# Patient Record
Sex: FEMALE | Race: WHITE | HISPANIC OR LATINO | Employment: OTHER | URBAN - METROPOLITAN AREA
[De-identification: names, ages, dates, MRNs, and addresses within clinical notes are randomized per-mention and may not be internally consistent; named-entity substitution may affect disease eponyms.]

---

## 2017-03-03 ENCOUNTER — APPOINTMENT (EMERGENCY)
Dept: CT IMAGING | Facility: HOSPITAL | Age: 72
End: 2017-03-03
Payer: MEDICARE

## 2017-03-03 ENCOUNTER — HOSPITAL ENCOUNTER (EMERGENCY)
Facility: HOSPITAL | Age: 72
Discharge: HOME | End: 2017-03-03
Attending: EMERGENCY MEDICINE | Admitting: EMERGENCY MEDICINE
Payer: MEDICARE

## 2017-03-03 VITALS
SYSTOLIC BLOOD PRESSURE: 172 MMHG | OXYGEN SATURATION: 98 % | HEART RATE: 85 BPM | RESPIRATION RATE: 16 BRPM | DIASTOLIC BLOOD PRESSURE: 74 MMHG | TEMPERATURE: 99.3 F | WEIGHT: 201.06 LBS

## 2017-03-03 DIAGNOSIS — K29.70 GASTRITIS: ICD-10-CM

## 2017-03-03 DIAGNOSIS — N94.89 ADNEXAL MASS: ICD-10-CM

## 2017-03-03 DIAGNOSIS — D25.9 UTERINE FIBROID: ICD-10-CM

## 2017-03-03 DIAGNOSIS — R10.13 EPIGASTRIC PAIN: Primary | ICD-10-CM

## 2017-03-03 LAB
ALBUMIN SERPL BCP-MCNC: 3.6 G/DL (ref 3.5–5)
ALP SERPL-CCNC: 358 U/L (ref 46–116)
ALT SERPL W P-5'-P-CCNC: 1139 U/L (ref 12–78)
ANION GAP SERPL CALCULATED.3IONS-SCNC: 7 MMOL/L (ref 4–13)
AST SERPL W P-5'-P-CCNC: 1478 U/L (ref 5–45)
ATRIAL RATE: 88 BPM
BASOPHILS # BLD AUTO: 0.03 THOUSANDS/ΜL (ref 0–0.1)
BASOPHILS NFR BLD AUTO: 0 % (ref 0–1)
BILIRUB SERPL-MCNC: 2 MG/DL (ref 0.2–1)
BUN SERPL-MCNC: 15 MG/DL (ref 5–25)
CALCIUM SERPL-MCNC: 9.3 MG/DL (ref 8.3–10.1)
CHLORIDE SERPL-SCNC: 105 MMOL/L (ref 100–108)
CLARITY, POC: CLEAR
CO2 SERPL-SCNC: 27 MMOL/L (ref 21–32)
COLOR, POC: YELLOW
CREAT SERPL-MCNC: 0.81 MG/DL (ref 0.6–1.3)
EOSINOPHIL # BLD AUTO: 0.02 THOUSAND/ΜL (ref 0–0.61)
EOSINOPHIL NFR BLD AUTO: 0 % (ref 0–6)
ERYTHROCYTE [DISTWIDTH] IN BLOOD BY AUTOMATED COUNT: 14.1 % (ref 11.6–15.1)
EXT BILIRUBIN, UA: NEGATIVE
EXT BLOOD URINE: NEGATIVE
EXT GLUCOSE, UA: NEGATIVE
EXT KETONES: NEGATIVE
EXT NITRITE, UA: NEGATIVE
EXT PH, UA: 8
EXT PROTEIN, UA: NEGATIVE
EXT SPECIFIC GRAVITY, UA: 1
EXT UROBILINOGEN: NEGATIVE
GFR SERPL CREATININE-BSD FRML MDRD: >60 ML/MIN/1.73SQ M
GLUCOSE SERPL-MCNC: 140 MG/DL (ref 65–140)
HCT VFR BLD AUTO: 41 % (ref 34.8–46.1)
HGB BLD-MCNC: 13.4 G/DL (ref 11.5–15.4)
LIPASE SERPL-CCNC: 106 U/L (ref 73–393)
LYMPHOCYTES # BLD AUTO: 0.51 THOUSANDS/ΜL (ref 0.6–4.47)
LYMPHOCYTES NFR BLD AUTO: 6 % (ref 14–44)
MCH RBC QN AUTO: 29.7 PG (ref 26.8–34.3)
MCHC RBC AUTO-ENTMCNC: 32.7 G/DL (ref 31.4–37.4)
MCV RBC AUTO: 91 FL (ref 82–98)
MONOCYTES # BLD AUTO: 0.59 THOUSAND/ΜL (ref 0.17–1.22)
MONOCYTES NFR BLD AUTO: 7 % (ref 4–12)
NEUTROPHILS # BLD AUTO: 7.33 THOUSANDS/ΜL (ref 1.85–7.62)
NEUTS SEG NFR BLD AUTO: 86 % (ref 43–75)
NRBC BLD AUTO-RTO: 0 /100 WBCS
P AXIS: 62 DEGREES
PLATELET # BLD AUTO: 232 THOUSANDS/UL (ref 149–390)
PMV BLD AUTO: 12.5 FL (ref 8.9–12.7)
POTASSIUM SERPL-SCNC: 4 MMOL/L (ref 3.5–5.3)
PR INTERVAL: 214 MS
PROT SERPL-MCNC: 7.6 G/DL (ref 6.4–8.2)
QRS AXIS: -16 DEGREES
QRSD INTERVAL: 124 MS
QT INTERVAL: 388 MS
QTC INTERVAL: 469 MS
RBC # BLD AUTO: 4.51 MILLION/UL (ref 3.81–5.12)
SODIUM SERPL-SCNC: 139 MMOL/L (ref 136–145)
T WAVE AXIS: 42 DEGREES
TROPONIN I SERPL-MCNC: 0.03 NG/ML
VENTRICULAR RATE: 88 BPM
WBC # BLD AUTO: 8.5 THOUSAND/UL (ref 4.31–10.16)
WBC # BLD EST: NEGATIVE 10*3/UL

## 2017-03-03 PROCEDURE — 96374 THER/PROPH/DIAG INJ IV PUSH: CPT

## 2017-03-03 PROCEDURE — 96361 HYDRATE IV INFUSION ADD-ON: CPT

## 2017-03-03 PROCEDURE — 93005 ELECTROCARDIOGRAM TRACING: CPT

## 2017-03-03 PROCEDURE — 83690 ASSAY OF LIPASE: CPT | Performed by: EMERGENCY MEDICINE

## 2017-03-03 PROCEDURE — 99284 EMERGENCY DEPT VISIT MOD MDM: CPT

## 2017-03-03 PROCEDURE — 84484 ASSAY OF TROPONIN QUANT: CPT | Performed by: EMERGENCY MEDICINE

## 2017-03-03 PROCEDURE — 85025 COMPLETE CBC W/AUTO DIFF WBC: CPT | Performed by: EMERGENCY MEDICINE

## 2017-03-03 PROCEDURE — 80053 COMPREHEN METABOLIC PANEL: CPT | Performed by: EMERGENCY MEDICINE

## 2017-03-03 PROCEDURE — 36415 COLL VENOUS BLD VENIPUNCTURE: CPT | Performed by: EMERGENCY MEDICINE

## 2017-03-03 PROCEDURE — 81002 URINALYSIS NONAUTO W/O SCOPE: CPT | Performed by: EMERGENCY MEDICINE

## 2017-03-03 PROCEDURE — 96375 TX/PRO/DX INJ NEW DRUG ADDON: CPT

## 2017-03-03 PROCEDURE — 74177 CT ABD & PELVIS W/CONTRAST: CPT

## 2017-03-03 RX ORDER — MAGNESIUM HYDROXIDE/ALUMINUM HYDROXICE/SIMETHICONE 120; 1200; 1200 MG/30ML; MG/30ML; MG/30ML
30 SUSPENSION ORAL ONCE
Status: COMPLETED | OUTPATIENT
Start: 2017-03-03 | End: 2017-03-03

## 2017-03-03 RX ORDER — NAPROXEN 500 MG/1
500 TABLET ORAL 2 TIMES DAILY WITH MEALS
COMMUNITY
End: 2017-04-13

## 2017-03-03 RX ORDER — LANSOPRAZOLE 30 MG/1
60 CAPSULE, DELAYED RELEASE ORAL DAILY
COMMUNITY
End: 2017-04-13

## 2017-03-03 RX ORDER — ONDANSETRON 2 MG/ML
4 INJECTION INTRAMUSCULAR; INTRAVENOUS ONCE
Status: COMPLETED | OUTPATIENT
Start: 2017-03-03 | End: 2017-03-03

## 2017-03-03 RX ORDER — ASCORBIC ACID, TOCOPHERYL ACID SUCCINATE, THIAMINE, RIBOFLAVIN, NIACINAMIDE, PYRIDOXINE, FOLIC ACID, COBALAMIN, BIOTIN, PANTOTHENIC ACID, ZINC, SELENIUM 100; 1.5; 1.7; 20; 25; 3; 1; 300; 10; 15; 30; 7 MG/1; MG/1; MG/1; MG/1; MG/1; MG/1; MG/1; UG/1; MG/1; MG/1; [IU]/1; UG/1
1 TABLET, COATED ORAL DAILY
COMMUNITY
End: 2017-04-13

## 2017-03-03 RX ORDER — ATENOLOL 50 MG/1
50 TABLET ORAL DAILY
COMMUNITY
End: 2020-08-24 | Stop reason: SDUPTHER

## 2017-03-03 RX ADMIN — IOHEXOL 100 ML: 350 INJECTION, SOLUTION INTRAVENOUS at 09:21

## 2017-03-03 RX ADMIN — ALUMINUM HYDROXIDE, MAGNESIUM HYDROXIDE, AND SIMETHICONE 30 ML: 200; 200; 20 SUSPENSION ORAL at 08:03

## 2017-03-03 RX ADMIN — FAMOTIDINE 20 MG: 10 INJECTION, SOLUTION INTRAVENOUS at 08:04

## 2017-03-03 RX ADMIN — ONDANSETRON 4 MG: 2 INJECTION INTRAMUSCULAR; INTRAVENOUS at 08:04

## 2017-03-03 RX ADMIN — LIDOCAINE HYDROCHLORIDE 15 ML: 20 SOLUTION ORAL; TOPICAL at 08:03

## 2017-03-03 RX ADMIN — SODIUM CHLORIDE 1000 ML: 0.9 INJECTION, SOLUTION INTRAVENOUS at 08:03

## 2017-03-24 ENCOUNTER — LAB (OUTPATIENT)
Dept: LAB | Facility: HOSPITAL | Age: 72
End: 2017-03-24
Attending: OBSTETRICS & GYNECOLOGY
Payer: MEDICARE

## 2017-03-24 ENCOUNTER — HOSPITAL ENCOUNTER (OUTPATIENT)
Dept: RADIOLOGY | Facility: HOSPITAL | Age: 72
Discharge: HOME | End: 2017-03-24
Payer: MEDICARE

## 2017-03-24 ENCOUNTER — ALLSCRIPTS OFFICE VISIT (OUTPATIENT)
Dept: OTHER | Facility: OTHER | Age: 72
End: 2017-03-24

## 2017-03-24 ENCOUNTER — TRANSCRIBE ORDERS (OUTPATIENT)
Dept: ADMINISTRATIVE | Facility: HOSPITAL | Age: 72
End: 2017-03-24

## 2017-03-24 ENCOUNTER — HOSPITAL ENCOUNTER (OUTPATIENT)
Dept: RADIOLOGY | Facility: HOSPITAL | Age: 72
Discharge: HOME | End: 2017-03-24
Attending: OBSTETRICS & GYNECOLOGY
Payer: MEDICARE

## 2017-03-24 DIAGNOSIS — K75.9 INFLAMMATORY LIVER DISEASE: ICD-10-CM

## 2017-03-24 DIAGNOSIS — R19.00 ABDOMINAL OR PELVIC SWELLING, MASS OR LUMP, UNSPECIFIED SITE: ICD-10-CM

## 2017-03-24 DIAGNOSIS — R19.00 INTRA-ABDOMINAL AND PELVIC SWELLING, MASS AND LUMP, UNSPECIFIED SITE: ICD-10-CM

## 2017-03-24 DIAGNOSIS — K75.9 HEPATITIS, UNSPECIFIED: ICD-10-CM

## 2017-03-24 DIAGNOSIS — K75.9 HEPATITIS, UNSPECIFIED: Primary | ICD-10-CM

## 2017-03-24 LAB
ALBUMIN SERPL BCP-MCNC: 3.5 G/DL (ref 3.5–5)
ALP SERPL-CCNC: 384 U/L (ref 46–116)
ALT SERPL W P-5'-P-CCNC: 766 U/L (ref 12–78)
ANION GAP SERPL CALCULATED.3IONS-SCNC: 7 MMOL/L (ref 4–13)
AST SERPL W P-5'-P-CCNC: 359 U/L (ref 5–45)
BASOPHILS # BLD AUTO: 0.03 THOUSANDS/ΜL (ref 0–0.1)
BASOPHILS NFR BLD AUTO: 1 % (ref 0–1)
BILIRUB DIRECT SERPL-MCNC: 0.38 MG/DL (ref 0–0.2)
BILIRUB SERPL-MCNC: 0.8 MG/DL (ref 0.2–1)
BUN SERPL-MCNC: 17 MG/DL (ref 5–25)
CALCIUM SERPL-MCNC: 9.7 MG/DL (ref 8.3–10.1)
CANCER AG125 SERPL-ACNC: 7.4 U/ML (ref 0–30)
CHLORIDE SERPL-SCNC: 105 MMOL/L (ref 100–108)
CO2 SERPL-SCNC: 30 MMOL/L (ref 21–32)
CREAT SERPL-MCNC: 0.8 MG/DL (ref 0.6–1.3)
EOSINOPHIL # BLD AUTO: 0.17 THOUSAND/ΜL (ref 0–0.61)
EOSINOPHIL NFR BLD AUTO: 3 % (ref 0–6)
ERYTHROCYTE [DISTWIDTH] IN BLOOD BY AUTOMATED COUNT: 14.2 % (ref 11.6–15.1)
EST. AVERAGE GLUCOSE BLD GHB EST-MCNC: 134 MG/DL
GFR SERPL CREATININE-BSD FRML MDRD: >60 ML/MIN/1.73SQ M
GLUCOSE SERPL-MCNC: 173 MG/DL (ref 65–140)
HBA1C MFR BLD: 6.3 % (ref 4.2–6.3)
HCT VFR BLD AUTO: 42.1 % (ref 34.8–46.1)
HGB BLD-MCNC: 13.4 G/DL (ref 11.5–15.4)
LYMPHOCYTES # BLD AUTO: 1.99 THOUSANDS/ΜL (ref 0.6–4.47)
LYMPHOCYTES NFR BLD AUTO: 31 % (ref 14–44)
MCH RBC QN AUTO: 29.5 PG (ref 26.8–34.3)
MCHC RBC AUTO-ENTMCNC: 31.8 G/DL (ref 31.4–37.4)
MCV RBC AUTO: 93 FL (ref 82–98)
MONOCYTES # BLD AUTO: 0.4 THOUSAND/ΜL (ref 0.17–1.22)
MONOCYTES NFR BLD AUTO: 6 % (ref 4–12)
NEUTROPHILS # BLD AUTO: 3.8 THOUSANDS/ΜL (ref 1.85–7.62)
NEUTS SEG NFR BLD AUTO: 59 % (ref 43–75)
NRBC BLD AUTO-RTO: 0 /100 WBCS
PLATELET # BLD AUTO: 185 THOUSANDS/UL (ref 149–390)
PMV BLD AUTO: 12.1 FL (ref 8.9–12.7)
POTASSIUM SERPL-SCNC: 3.7 MMOL/L (ref 3.5–5.3)
PROT SERPL-MCNC: 7.6 G/DL (ref 6.4–8.2)
RBC # BLD AUTO: 4.54 MILLION/UL (ref 3.81–5.12)
SODIUM SERPL-SCNC: 142 MMOL/L (ref 136–145)
WBC # BLD AUTO: 6.41 THOUSAND/UL (ref 4.31–10.16)

## 2017-03-24 PROCEDURE — 36415 COLL VENOUS BLD VENIPUNCTURE: CPT

## 2017-03-24 PROCEDURE — 80074 ACUTE HEPATITIS PANEL: CPT

## 2017-03-24 PROCEDURE — 80053 COMPREHEN METABOLIC PANEL: CPT

## 2017-03-24 PROCEDURE — 85025 COMPLETE CBC W/AUTO DIFF WBC: CPT

## 2017-03-24 PROCEDURE — 83036 HEMOGLOBIN GLYCOSYLATED A1C: CPT

## 2017-03-24 PROCEDURE — 71010 HB CHEST X-RAY 1 VIEW FRONTAL: CPT

## 2017-03-24 PROCEDURE — 82248 BILIRUBIN DIRECT: CPT

## 2017-03-24 PROCEDURE — 74022 RADEX COMPL AQT ABD SERIES: CPT

## 2017-03-24 PROCEDURE — 86304 IMMUNOASSAY TUMOR CA 125: CPT

## 2017-03-25 LAB
HAV IGM SER QL: NORMAL
HBV CORE IGM SER QL: NORMAL
HBV SURFACE AG SER QL: NORMAL
HCV AB SER QL: NORMAL

## 2017-04-01 ENCOUNTER — HOSPITAL ENCOUNTER (OUTPATIENT)
Dept: ULTRASOUND IMAGING | Facility: HOSPITAL | Age: 72
Discharge: HOME | End: 2017-04-01
Attending: OBSTETRICS & GYNECOLOGY
Payer: MEDICARE

## 2017-04-01 DIAGNOSIS — K75.9 INFLAMMATORY LIVER DISEASE: ICD-10-CM

## 2017-04-01 DIAGNOSIS — R19.00 INTRA-ABDOMINAL AND PELVIC SWELLING, MASS AND LUMP, UNSPECIFIED SITE: ICD-10-CM

## 2017-04-01 PROCEDURE — 76705 ECHO EXAM OF ABDOMEN: CPT

## 2017-04-05 ENCOUNTER — GENERIC CONVERSION - ENCOUNTER (OUTPATIENT)
Dept: OTHER | Facility: OTHER | Age: 72
End: 2017-04-05

## 2017-04-10 ENCOUNTER — ALLSCRIPTS OFFICE VISIT (OUTPATIENT)
Dept: OTHER | Facility: OTHER | Age: 72
End: 2017-04-10

## 2017-04-13 RX ORDER — CHOLECALCIFEROL (VITAMIN D3) 125 MCG
1 CAPSULE ORAL DAILY
COMMUNITY

## 2017-04-13 RX ORDER — LANOLIN ALCOHOL/MO/W.PET/CERES
2 CREAM (GRAM) TOPICAL DAILY
COMMUNITY
End: 2017-06-15 | Stop reason: ALTCHOICE

## 2017-04-13 RX ORDER — RANITIDINE 150 MG/1
150 CAPSULE ORAL AS NEEDED
COMMUNITY
End: 2020-01-29

## 2017-04-17 ENCOUNTER — APPOINTMENT (OUTPATIENT)
Dept: LAB | Facility: HOSPITAL | Age: 72
End: 2017-04-17
Attending: INTERNAL MEDICINE
Payer: MEDICARE

## 2017-04-17 ENCOUNTER — TRANSCRIBE ORDERS (OUTPATIENT)
Dept: LAB | Facility: HOSPITAL | Age: 72
End: 2017-04-17

## 2017-04-17 DIAGNOSIS — R19.00 ABDOMINAL OR PELVIC SWELLING, MASS OR LUMP, UNSPECIFIED SITE: Primary | ICD-10-CM

## 2017-04-17 DIAGNOSIS — K75.9 INFLAMMATORY LIVER DISEASE: ICD-10-CM

## 2017-04-17 LAB
ALBUMIN SERPL BCP-MCNC: 3.3 G/DL (ref 3.5–5)
ALP SERPL-CCNC: 119 U/L (ref 46–116)
ALT SERPL W P-5'-P-CCNC: 44 U/L (ref 12–78)
ANION GAP SERPL CALCULATED.3IONS-SCNC: 7 MMOL/L (ref 4–13)
AST SERPL W P-5'-P-CCNC: 31 U/L (ref 5–45)
BILIRUB SERPL-MCNC: 0.3 MG/DL (ref 0.2–1)
BUN SERPL-MCNC: 13 MG/DL (ref 5–25)
CALCIUM SERPL-MCNC: 9.4 MG/DL (ref 8.3–10.1)
CHLORIDE SERPL-SCNC: 106 MMOL/L (ref 100–108)
CO2 SERPL-SCNC: 30 MMOL/L (ref 21–32)
CREAT SERPL-MCNC: 0.91 MG/DL (ref 0.6–1.3)
FERRITIN SERPL-MCNC: 26 NG/ML (ref 8–388)
GFR SERPL CREATININE-BSD FRML MDRD: >60 ML/MIN/1.73SQ M
GLUCOSE P FAST SERPL-MCNC: 101 MG/DL (ref 65–99)
INR PPP: 1.09 (ref 0.86–1.16)
IRON SATN MFR SERPL: 13 %
IRON SERPL-MCNC: 42 UG/DL (ref 50–170)
POTASSIUM SERPL-SCNC: 4 MMOL/L (ref 3.5–5.3)
PROT SERPL-MCNC: 7.1 G/DL (ref 6.4–8.2)
PROTHROMBIN TIME: 14 SECONDS (ref 12–14.3)
SODIUM SERPL-SCNC: 143 MMOL/L (ref 136–145)
TIBC SERPL-MCNC: 314 UG/DL (ref 250–450)

## 2017-04-17 PROCEDURE — 83550 IRON BINDING TEST: CPT

## 2017-04-17 PROCEDURE — 83540 ASSAY OF IRON: CPT

## 2017-04-17 PROCEDURE — 85610 PROTHROMBIN TIME: CPT

## 2017-04-17 PROCEDURE — 36415 COLL VENOUS BLD VENIPUNCTURE: CPT

## 2017-04-17 PROCEDURE — 80053 COMPREHEN METABOLIC PANEL: CPT

## 2017-04-17 PROCEDURE — 86038 ANTINUCLEAR ANTIBODIES: CPT

## 2017-04-17 PROCEDURE — 86235 NUCLEAR ANTIGEN ANTIBODY: CPT

## 2017-04-17 PROCEDURE — 82728 ASSAY OF FERRITIN: CPT

## 2017-04-18 LAB — ACTIN IGG SERPL-ACNC: 14 UNITS (ref 0–19)

## 2017-04-19 LAB — RYE IGE QN: NEGATIVE

## 2017-04-23 ENCOUNTER — GENERIC CONVERSION - ENCOUNTER (OUTPATIENT)
Dept: OTHER | Facility: OTHER | Age: 72
End: 2017-04-23

## 2017-05-03 ENCOUNTER — TRANSCRIBE ORDERS (OUTPATIENT)
Dept: ADMINISTRATIVE | Facility: HOSPITAL | Age: 72
End: 2017-05-03

## 2017-05-03 ENCOUNTER — LAB REQUISITION (OUTPATIENT)
Dept: LAB | Facility: HOSPITAL | Age: 72
End: 2017-05-03
Payer: MEDICARE

## 2017-05-03 ENCOUNTER — APPOINTMENT (OUTPATIENT)
Dept: LAB | Facility: HOSPITAL | Age: 72
End: 2017-05-03
Attending: OBSTETRICS & GYNECOLOGY
Payer: MEDICARE

## 2017-05-03 DIAGNOSIS — R19.00 ABDOMINAL OR PELVIC SWELLING, MASS OR LUMP, UNSPECIFIED SITE: ICD-10-CM

## 2017-05-03 DIAGNOSIS — R19.00 INTRA-ABDOMINAL AND PELVIC SWELLING, MASS AND LUMP, UNSPECIFIED SITE: ICD-10-CM

## 2017-05-03 DIAGNOSIS — R19.00 ABDOMINAL OR PELVIC SWELLING, MASS OR LUMP, UNSPECIFIED SITE: Primary | ICD-10-CM

## 2017-05-03 LAB
ABO GROUP BLD: NORMAL
BLD GP AB SCN SERPL QL: NEGATIVE
RH BLD: POSITIVE
SPECIMEN EXPIRATION DATE: NORMAL

## 2017-05-03 PROCEDURE — 36415 COLL VENOUS BLD VENIPUNCTURE: CPT

## 2017-05-03 PROCEDURE — 86850 RBC ANTIBODY SCREEN: CPT | Performed by: OBSTETRICS & GYNECOLOGY

## 2017-05-03 PROCEDURE — 86901 BLOOD TYPING SEROLOGIC RH(D): CPT | Performed by: OBSTETRICS & GYNECOLOGY

## 2017-05-03 PROCEDURE — 86900 BLOOD TYPING SEROLOGIC ABO: CPT | Performed by: OBSTETRICS & GYNECOLOGY

## 2017-05-04 ENCOUNTER — GENERIC CONVERSION - ENCOUNTER (OUTPATIENT)
Dept: OTHER | Facility: OTHER | Age: 72
End: 2017-05-04

## 2017-05-10 ENCOUNTER — ANESTHESIA EVENT (OUTPATIENT)
Dept: PERIOP | Facility: HOSPITAL | Age: 72
DRG: 330 | End: 2017-05-10
Payer: MEDICARE

## 2017-05-11 ENCOUNTER — ANESTHESIA (OUTPATIENT)
Dept: PERIOP | Facility: HOSPITAL | Age: 72
DRG: 330 | End: 2017-05-11
Payer: MEDICARE

## 2017-05-11 ENCOUNTER — HOSPITAL ENCOUNTER (INPATIENT)
Facility: HOSPITAL | Age: 72
LOS: 5 days | Discharge: HOME HEALTH | DRG: 330 | End: 2017-05-16
Attending: OBSTETRICS & GYNECOLOGY | Admitting: OBSTETRICS & GYNECOLOGY
Payer: MEDICARE

## 2017-05-11 DIAGNOSIS — R19.00 INTRA-ABDOMINAL AND PELVIC SWELLING, MASS AND LUMP, UNSPECIFIED SITE: ICD-10-CM

## 2017-05-11 PROBLEM — C78.6 PERITONEAL CARCINOMATOSIS (HCC): Status: ACTIVE | Noted: 2017-05-11

## 2017-05-11 PROBLEM — G47.30 SLEEP APNEA: Status: ACTIVE | Noted: 2017-05-11

## 2017-05-11 PROBLEM — K59.09 CHRONIC CONSTIPATION: Status: ACTIVE | Noted: 2017-05-11

## 2017-05-11 PROBLEM — E78.5 HYPERLIPIDEMIA: Status: ACTIVE | Noted: 2017-05-11

## 2017-05-11 PROBLEM — M51.9 DISORDER OF INTERVERTEBRAL DISC: Status: ACTIVE | Noted: 2017-05-11

## 2017-05-11 LAB
ALBUMIN SERPL BCP-MCNC: 3.1 G/DL (ref 3.5–5)
ALP SERPL-CCNC: 64 U/L (ref 46–116)
ALT SERPL W P-5'-P-CCNC: 155 U/L (ref 12–78)
ANION GAP SERPL CALCULATED.3IONS-SCNC: 10 MMOL/L (ref 4–13)
APTT PPP: 25 SECONDS (ref 23–35)
AST SERPL W P-5'-P-CCNC: 204 U/L (ref 5–45)
BASE EXCESS BLDA CALC-SCNC: -5 MMOL/L (ref -2–3)
BASOPHILS # BLD AUTO: 0 THOUSANDS/ΜL (ref 0–0.1)
BASOPHILS NFR BLD AUTO: 0 % (ref 0–1)
BILIRUB SERPL-MCNC: 0.93 MG/DL (ref 0.2–1)
BUN SERPL-MCNC: 12 MG/DL (ref 5–25)
CA-I BLD-SCNC: 1.18 MMOL/L (ref 1.12–1.32)
CALCIUM SERPL-MCNC: 7.9 MG/DL (ref 8.3–10.1)
CHLORIDE SERPL-SCNC: 109 MMOL/L (ref 100–108)
CO2 SERPL-SCNC: 22 MMOL/L (ref 21–32)
CREAT SERPL-MCNC: 0.78 MG/DL (ref 0.6–1.3)
EOSINOPHIL # BLD AUTO: 0.01 THOUSAND/ΜL (ref 0–0.61)
EOSINOPHIL NFR BLD AUTO: 0 % (ref 0–6)
ERYTHROCYTE [DISTWIDTH] IN BLOOD BY AUTOMATED COUNT: 13.8 % (ref 11.6–15.1)
ERYTHROCYTE [DISTWIDTH] IN BLOOD BY AUTOMATED COUNT: 13.9 % (ref 11.6–15.1)
GFR SERPL CREATININE-BSD FRML MDRD: >60 ML/MIN/1.73SQ M
GLUCOSE SERPL-MCNC: 152 MG/DL (ref 65–140)
GLUCOSE SERPL-MCNC: 168 MG/DL (ref 65–140)
GLUCOSE SERPL-MCNC: 174 MG/DL (ref 65–140)
HCO3 BLDA-SCNC: 20.9 MMOL/L (ref 22–28)
HCT VFR BLD AUTO: 32.8 % (ref 34.8–46.1)
HCT VFR BLD AUTO: 46.8 % (ref 34.8–46.1)
HCT VFR BLD CALC: 32 % (ref 34.8–46.1)
HGB BLD-MCNC: 10.7 G/DL (ref 11.5–15.4)
HGB BLD-MCNC: 15.3 G/DL (ref 11.5–15.4)
HGB BLDA-MCNC: 10.9 G/DL (ref 11.5–15.4)
INR PPP: 1.3 (ref 0.86–1.16)
LYMPHOCYTES # BLD AUTO: 0.63 THOUSANDS/ΜL (ref 0.6–4.47)
LYMPHOCYTES NFR BLD AUTO: 8 % (ref 14–44)
MCH RBC QN AUTO: 29.8 PG (ref 26.8–34.3)
MCH RBC QN AUTO: 29.9 PG (ref 26.8–34.3)
MCHC RBC AUTO-ENTMCNC: 32.6 G/DL (ref 31.4–37.4)
MCHC RBC AUTO-ENTMCNC: 32.7 G/DL (ref 31.4–37.4)
MCV RBC AUTO: 91 FL (ref 82–98)
MCV RBC AUTO: 92 FL (ref 82–98)
MONOCYTES # BLD AUTO: 0.61 THOUSAND/ΜL (ref 0.17–1.22)
MONOCYTES NFR BLD AUTO: 8 % (ref 4–12)
NEUTROPHILS # BLD AUTO: 6.82 THOUSANDS/ΜL (ref 1.85–7.62)
NEUTS SEG NFR BLD AUTO: 84 % (ref 43–75)
NRBC BLD AUTO-RTO: 0 /100 WBCS
PCO2 BLD: 22 MMOL/L (ref 21–32)
PCO2 BLD: 41.3 MM HG (ref 36–44)
PH BLD: 7.31 [PH] (ref 7.35–7.45)
PLATELET # BLD AUTO: 150 THOUSANDS/UL (ref 149–390)
PLATELET # BLD AUTO: 214 THOUSANDS/UL (ref 149–390)
PMV BLD AUTO: 11.8 FL (ref 8.9–12.7)
PMV BLD AUTO: 12.3 FL (ref 8.9–12.7)
PO2 BLD: 63 MM HG (ref 75–129)
POTASSIUM BLD-SCNC: 3.4 MMOL/L (ref 3.5–5.3)
POTASSIUM SERPL-SCNC: 3.3 MMOL/L (ref 3.5–5.3)
PROT SERPL-MCNC: 5.4 G/DL (ref 6.4–8.2)
PROTHROMBIN TIME: 16.3 SECONDS (ref 12.1–14.4)
RBC # BLD AUTO: 3.59 MILLION/UL (ref 3.81–5.12)
RBC # BLD AUTO: 5.11 MILLION/UL (ref 3.81–5.12)
SAO2 % BLD FROM PO2: 90 % (ref 95–98)
SODIUM BLD-SCNC: 142 MMOL/L (ref 136–145)
SODIUM SERPL-SCNC: 141 MMOL/L (ref 136–145)
SPECIMEN SOURCE: ABNORMAL
WBC # BLD AUTO: 8.08 THOUSAND/UL (ref 4.31–10.16)
WBC # BLD AUTO: 8.74 THOUSAND/UL (ref 4.31–10.16)

## 2017-05-11 PROCEDURE — 94762 N-INVAS EAR/PLS OXIMTRY CONT: CPT

## 2017-05-11 PROCEDURE — 0DTP0ZZ RESECTION OF RECTUM, OPEN APPROACH: ICD-10-PCS | Performed by: OBSTETRICS & GYNECOLOGY

## 2017-05-11 PROCEDURE — 85027 COMPLETE CBC AUTOMATED: CPT | Performed by: ANESTHESIOLOGY

## 2017-05-11 PROCEDURE — 85014 HEMATOCRIT: CPT

## 2017-05-11 PROCEDURE — 82947 ASSAY GLUCOSE BLOOD QUANT: CPT

## 2017-05-11 PROCEDURE — 88305 TISSUE EXAM BY PATHOLOGIST: CPT | Performed by: OBSTETRICS & GYNECOLOGY

## 2017-05-11 PROCEDURE — 0BTR0ZZ: ICD-10-PCS | Performed by: OBSTETRICS & GYNECOLOGY

## 2017-05-11 PROCEDURE — 88342 IMHCHEM/IMCYTCHM 1ST ANTB: CPT | Performed by: OBSTETRICS & GYNECOLOGY

## 2017-05-11 PROCEDURE — 0DBW4ZX EXCISION OF PERITONEUM, PERCUTANEOUS ENDOSCOPIC APPROACH, DIAGNOSTIC: ICD-10-PCS | Performed by: OBSTETRICS & GYNECOLOGY

## 2017-05-11 PROCEDURE — 82803 BLOOD GASES ANY COMBINATION: CPT

## 2017-05-11 PROCEDURE — 82330 ASSAY OF CALCIUM: CPT

## 2017-05-11 PROCEDURE — 0UTC0ZZ RESECTION OF CERVIX, OPEN APPROACH: ICD-10-PCS | Performed by: OBSTETRICS & GYNECOLOGY

## 2017-05-11 PROCEDURE — 0DTN0ZZ RESECTION OF SIGMOID COLON, OPEN APPROACH: ICD-10-PCS | Performed by: OBSTETRICS & GYNECOLOGY

## 2017-05-11 PROCEDURE — 86920 COMPATIBILITY TEST SPIN: CPT

## 2017-05-11 PROCEDURE — 85610 PROTHROMBIN TIME: CPT | Performed by: OBSTETRICS & GYNECOLOGY

## 2017-05-11 PROCEDURE — 0UT70ZZ RESECTION OF BILATERAL FALLOPIAN TUBES, OPEN APPROACH: ICD-10-PCS | Performed by: OBSTETRICS & GYNECOLOGY

## 2017-05-11 PROCEDURE — 84295 ASSAY OF SERUM SODIUM: CPT

## 2017-05-11 PROCEDURE — 84132 ASSAY OF SERUM POTASSIUM: CPT

## 2017-05-11 PROCEDURE — 0DJD8ZZ INSPECTION OF LOWER INTESTINAL TRACT, VIA NATURAL OR ARTIFICIAL OPENING ENDOSCOPIC: ICD-10-PCS | Performed by: OBSTETRICS & GYNECOLOGY

## 2017-05-11 PROCEDURE — 0DBS0ZZ EXCISION OF GREATER OMENTUM, OPEN APPROACH: ICD-10-PCS | Performed by: OBSTETRICS & GYNECOLOGY

## 2017-05-11 PROCEDURE — 82948 REAGENT STRIP/BLOOD GLUCOSE: CPT

## 2017-05-11 PROCEDURE — 0UT90ZZ RESECTION OF UTERUS, OPEN APPROACH: ICD-10-PCS | Performed by: OBSTETRICS & GYNECOLOGY

## 2017-05-11 PROCEDURE — 80053 COMPREHEN METABOLIC PANEL: CPT | Performed by: OBSTETRICS & GYNECOLOGY

## 2017-05-11 PROCEDURE — 88331 PATH CONSLTJ SURG 1 BLK 1SPC: CPT | Performed by: OBSTETRICS & GYNECOLOGY

## 2017-05-11 PROCEDURE — 88309 TISSUE EXAM BY PATHOLOGIST: CPT | Performed by: OBSTETRICS & GYNECOLOGY

## 2017-05-11 PROCEDURE — 0UN40ZZ RELEASE UTERINE SUPPORTING STRUCTURE, OPEN APPROACH: ICD-10-PCS | Performed by: OBSTETRICS & GYNECOLOGY

## 2017-05-11 PROCEDURE — 85025 COMPLETE CBC W/AUTO DIFF WBC: CPT | Performed by: OBSTETRICS & GYNECOLOGY

## 2017-05-11 PROCEDURE — 85730 THROMBOPLASTIN TIME PARTIAL: CPT | Performed by: OBSTETRICS & GYNECOLOGY

## 2017-05-11 PROCEDURE — 0UT20ZZ RESECTION OF BILATERAL OVARIES, OPEN APPROACH: ICD-10-PCS | Performed by: OBSTETRICS & GYNECOLOGY

## 2017-05-11 PROCEDURE — 88341 IMHCHEM/IMCYTCHM EA ADD ANTB: CPT | Performed by: OBSTETRICS & GYNECOLOGY

## 2017-05-11 RX ORDER — PANTOPRAZOLE SODIUM 40 MG/1
40 TABLET, DELAYED RELEASE ORAL DAILY
Status: CANCELLED | OUTPATIENT
Start: 2017-05-11

## 2017-05-11 RX ORDER — ALBUTEROL SULFATE 2.5 MG/3ML
2.5 SOLUTION RESPIRATORY (INHALATION) ONCE AS NEEDED
Status: DISCONTINUED | OUTPATIENT
Start: 2017-05-11 | End: 2017-05-11 | Stop reason: HOSPADM

## 2017-05-11 RX ORDER — ONDANSETRON 2 MG/ML
INJECTION INTRAMUSCULAR; INTRAVENOUS AS NEEDED
Status: DISCONTINUED | OUTPATIENT
Start: 2017-05-11 | End: 2017-05-11 | Stop reason: SURG

## 2017-05-11 RX ORDER — DIPHENHYDRAMINE HYDROCHLORIDE 50 MG/ML
25 INJECTION INTRAMUSCULAR; INTRAVENOUS EVERY 6 HOURS PRN
Status: DISCONTINUED | OUTPATIENT
Start: 2017-05-11 | End: 2017-05-16 | Stop reason: HOSPADM

## 2017-05-11 RX ORDER — GLYCOPYRROLATE 0.2 MG/ML
INJECTION INTRAMUSCULAR; INTRAVENOUS AS NEEDED
Status: DISCONTINUED | OUTPATIENT
Start: 2017-05-11 | End: 2017-05-11 | Stop reason: SURG

## 2017-05-11 RX ORDER — NALOXONE HYDROCHLORIDE 0.4 MG/ML
0.1 INJECTION, SOLUTION INTRAMUSCULAR; INTRAVENOUS; SUBCUTANEOUS AS NEEDED
Status: DISCONTINUED | OUTPATIENT
Start: 2017-05-11 | End: 2017-05-14

## 2017-05-11 RX ORDER — HEPARIN SODIUM 5000 [USP'U]/ML
5000 INJECTION, SOLUTION INTRAVENOUS; SUBCUTANEOUS EVERY 8 HOURS SCHEDULED
Status: DISCONTINUED | OUTPATIENT
Start: 2017-05-11 | End: 2017-05-11

## 2017-05-11 RX ORDER — ONDANSETRON 2 MG/ML
4 INJECTION INTRAMUSCULAR; INTRAVENOUS ONCE
Status: DISCONTINUED | OUTPATIENT
Start: 2017-05-11 | End: 2017-05-11 | Stop reason: HOSPADM

## 2017-05-11 RX ORDER — ONDANSETRON 2 MG/ML
4 INJECTION INTRAMUSCULAR; INTRAVENOUS EVERY 6 HOURS PRN
Status: DISCONTINUED | OUTPATIENT
Start: 2017-05-11 | End: 2017-05-16 | Stop reason: HOSPADM

## 2017-05-11 RX ORDER — MIDAZOLAM HYDROCHLORIDE 1 MG/ML
INJECTION INTRAMUSCULAR; INTRAVENOUS AS NEEDED
Status: DISCONTINUED | OUTPATIENT
Start: 2017-05-11 | End: 2017-05-11 | Stop reason: SURG

## 2017-05-11 RX ORDER — ROCURONIUM BROMIDE 10 MG/ML
INJECTION, SOLUTION INTRAVENOUS AS NEEDED
Status: DISCONTINUED | OUTPATIENT
Start: 2017-05-11 | End: 2017-05-11 | Stop reason: SURG

## 2017-05-11 RX ORDER — ALBUMIN, HUMAN INJ 5% 5 %
SOLUTION INTRAVENOUS CONTINUOUS PRN
Status: DISCONTINUED | OUTPATIENT
Start: 2017-05-11 | End: 2017-05-11 | Stop reason: SURG

## 2017-05-11 RX ORDER — MAGNESIUM HYDROXIDE 1200 MG/15ML
LIQUID ORAL AS NEEDED
Status: DISCONTINUED | OUTPATIENT
Start: 2017-05-11 | End: 2017-05-11 | Stop reason: HOSPADM

## 2017-05-11 RX ORDER — EPHEDRINE SULFATE 50 MG/ML
INJECTION, SOLUTION INTRAVENOUS AS NEEDED
Status: DISCONTINUED | OUTPATIENT
Start: 2017-05-11 | End: 2017-05-11 | Stop reason: SURG

## 2017-05-11 RX ORDER — PROPOFOL 10 MG/ML
INJECTION, EMULSION INTRAVENOUS AS NEEDED
Status: DISCONTINUED | OUTPATIENT
Start: 2017-05-11 | End: 2017-05-11 | Stop reason: SURG

## 2017-05-11 RX ORDER — LIDOCAINE HYDROCHLORIDE AND EPINEPHRINE 15; 5 MG/ML; UG/ML
INJECTION, SOLUTION EPIDURAL AS NEEDED
Status: DISCONTINUED | OUTPATIENT
Start: 2017-05-11 | End: 2017-05-11 | Stop reason: SURG

## 2017-05-11 RX ORDER — SODIUM CHLORIDE 9 MG/ML
INJECTION, SOLUTION INTRAVENOUS CONTINUOUS PRN
Status: DISCONTINUED | OUTPATIENT
Start: 2017-05-11 | End: 2017-05-11 | Stop reason: SURG

## 2017-05-11 RX ORDER — SODIUM CHLORIDE, SODIUM LACTATE, POTASSIUM CHLORIDE, CALCIUM CHLORIDE 600; 310; 30; 20 MG/100ML; MG/100ML; MG/100ML; MG/100ML
50 INJECTION, SOLUTION INTRAVENOUS CONTINUOUS
Status: DISCONTINUED | OUTPATIENT
Start: 2017-05-11 | End: 2017-05-11

## 2017-05-11 RX ORDER — PANTOPRAZOLE SODIUM 40 MG/1
40 INJECTION, POWDER, FOR SOLUTION INTRAVENOUS
Status: DISCONTINUED | OUTPATIENT
Start: 2017-05-12 | End: 2017-05-12

## 2017-05-11 RX ORDER — HEPARIN SODIUM 5000 [USP'U]/ML
INJECTION, SOLUTION INTRAVENOUS; SUBCUTANEOUS AS NEEDED
Status: DISCONTINUED | OUTPATIENT
Start: 2017-05-11 | End: 2017-05-11 | Stop reason: SURG

## 2017-05-11 RX ORDER — POLYETHYLENE GLYCOL 3350 17 G/17G
17 POWDER, FOR SOLUTION ORAL DAILY
Status: CANCELLED | OUTPATIENT
Start: 2017-05-11

## 2017-05-11 RX ORDER — DEXTROSE, SODIUM CHLORIDE, AND POTASSIUM CHLORIDE 5; .45; .15 G/100ML; G/100ML; G/100ML
100 INJECTION INTRAVENOUS CONTINUOUS
Status: DISCONTINUED | OUTPATIENT
Start: 2017-05-11 | End: 2017-05-14

## 2017-05-11 RX ORDER — LIDOCAINE HYDROCHLORIDE 10 MG/ML
INJECTION, SOLUTION INFILTRATION; PERINEURAL AS NEEDED
Status: DISCONTINUED | OUTPATIENT
Start: 2017-05-11 | End: 2017-05-11 | Stop reason: SURG

## 2017-05-11 RX ORDER — DOCUSATE SODIUM 100 MG/1
100 CAPSULE, LIQUID FILLED ORAL 2 TIMES DAILY
Status: CANCELLED | OUTPATIENT
Start: 2017-05-11

## 2017-05-11 RX ORDER — FENTANYL CITRATE 50 UG/ML
INJECTION, SOLUTION INTRAMUSCULAR; INTRAVENOUS AS NEEDED
Status: DISCONTINUED | OUTPATIENT
Start: 2017-05-11 | End: 2017-05-11 | Stop reason: SURG

## 2017-05-11 RX ORDER — ROPIVACAINE HYDROCHLORIDE 2 MG/ML
INJECTION, SOLUTION EPIDURAL; INFILTRATION; PERINEURAL AS NEEDED
Status: DISCONTINUED | OUTPATIENT
Start: 2017-05-11 | End: 2017-05-11 | Stop reason: SURG

## 2017-05-11 RX ORDER — INDOCYANINE GREEN AND WATER 25 MG
KIT INJECTION AS NEEDED
Status: DISCONTINUED | OUTPATIENT
Start: 2017-05-11 | End: 2017-05-11 | Stop reason: SURG

## 2017-05-11 RX ORDER — BUPIVACAINE HYDROCHLORIDE 2.5 MG/ML
INJECTION, SOLUTION EPIDURAL; INFILTRATION; INTRACAUDAL AS NEEDED
Status: DISCONTINUED | OUTPATIENT
Start: 2017-05-11 | End: 2017-05-11 | Stop reason: HOSPADM

## 2017-05-11 RX ORDER — FENTANYL CITRATE/PF 50 MCG/ML
25 SYRINGE (ML) INJECTION
Status: DISCONTINUED | OUTPATIENT
Start: 2017-05-11 | End: 2017-05-11 | Stop reason: HOSPADM

## 2017-05-11 RX ORDER — ATENOLOL 50 MG/1
50 TABLET ORAL DAILY
Status: DISCONTINUED | OUTPATIENT
Start: 2017-05-12 | End: 2017-05-11

## 2017-05-11 RX ADMIN — LIDOCAINE HYDROCHLORIDE 100 MG: 10 INJECTION, SOLUTION INFILTRATION; PERINEURAL at 11:02

## 2017-05-11 RX ADMIN — METRONIDAZOLE 500 MG: 500 SOLUTION INTRAVENOUS at 22:44

## 2017-05-11 RX ADMIN — ROPIVACAINE HYDROCHLORIDE 8 ML: 2 INJECTION, SOLUTION EPIDURAL; INFILTRATION at 12:35

## 2017-05-11 RX ADMIN — HEPARIN SODIUM 5000 UNITS: 5000 INJECTION, SOLUTION INTRAVENOUS; SUBCUTANEOUS at 12:40

## 2017-05-11 RX ADMIN — ROPIVACAINE HYDROCHLORIDE 1 ML: 2 INJECTION, SOLUTION EPIDURAL; INFILTRATION at 13:22

## 2017-05-11 RX ADMIN — SODIUM CHLORIDE, SODIUM LACTATE, POTASSIUM CHLORIDE, AND CALCIUM CHLORIDE: .6; .31; .03; .02 INJECTION, SOLUTION INTRAVENOUS at 11:15

## 2017-05-11 RX ADMIN — NEOSTIGMINE METHYLSULFATE 3 MG: 1 INJECTION, SOLUTION INTRAMUSCULAR; INTRAVENOUS; SUBCUTANEOUS at 15:46

## 2017-05-11 RX ADMIN — ROPIVACAINE HYDROCHLORIDE 3 ML: 2 INJECTION, SOLUTION EPIDURAL; INFILTRATION at 12:30

## 2017-05-11 RX ADMIN — SODIUM CHLORIDE: 0.9 INJECTION, SOLUTION INTRAVENOUS at 13:37

## 2017-05-11 RX ADMIN — MIDAZOLAM HYDROCHLORIDE 2 MG: 1 INJECTION, SOLUTION INTRAMUSCULAR; INTRAVENOUS at 10:57

## 2017-05-11 RX ADMIN — ROCURONIUM BROMIDE 50 MG: 10 INJECTION, SOLUTION INTRAVENOUS at 11:02

## 2017-05-11 RX ADMIN — GLYCOPYRROLATE 0.5 MG: 0.2 INJECTION INTRAMUSCULAR; INTRAVENOUS at 15:46

## 2017-05-11 RX ADMIN — DEXTROSE, SODIUM CHLORIDE, AND POTASSIUM CHLORIDE 125 ML/HR: 5; .45; .15 INJECTION INTRAVENOUS at 17:15

## 2017-05-11 RX ADMIN — FENTANYL CITRATE 100 MCG: 50 INJECTION, SOLUTION INTRAMUSCULAR; INTRAVENOUS at 11:02

## 2017-05-11 RX ADMIN — ALBUMIN HUMAN: 0.05 INJECTION, SOLUTION INTRAVENOUS at 11:47

## 2017-05-11 RX ADMIN — INDOCYANINE GREEN AND WATER 6.25 MG: KIT at 14:44

## 2017-05-11 RX ADMIN — SODIUM CHLORIDE, SODIUM LACTATE, POTASSIUM CHLORIDE, AND CALCIUM CHLORIDE 50 ML/HR: .6; .31; .03; .02 INJECTION, SOLUTION INTRAVENOUS at 09:39

## 2017-05-11 RX ADMIN — CEFAZOLIN SODIUM 2000 MG: 2 SOLUTION INTRAVENOUS at 14:47

## 2017-05-11 RX ADMIN — LIDOCAINE HYDROCHLORIDE AND EPINEPHRINE 3 ML: 15; 5 INJECTION, SOLUTION EPIDURAL at 11:20

## 2017-05-11 RX ADMIN — DEXAMETHASONE SODIUM PHOSPHATE 10 MG: 10 INJECTION INTRAMUSCULAR; INTRAVENOUS at 11:02

## 2017-05-11 RX ADMIN — SODIUM CHLORIDE: 0.9 INJECTION, SOLUTION INTRAVENOUS at 11:06

## 2017-05-11 RX ADMIN — ROCURONIUM BROMIDE 15 MG: 10 INJECTION, SOLUTION INTRAVENOUS at 14:04

## 2017-05-11 RX ADMIN — METRONIDAZOLE 500 MG: 500 SOLUTION INTRAVENOUS at 11:27

## 2017-05-11 RX ADMIN — EPHEDRINE SULFATE 10 MG: 50 INJECTION, SOLUTION INTRAMUSCULAR; INTRAVENOUS; SUBCUTANEOUS at 13:10

## 2017-05-11 RX ADMIN — ROPIVACAINE HYDROCHLORIDE 14 ML: 2 INJECTION, SOLUTION EPIDURAL; INFILTRATION at 15:37

## 2017-05-11 RX ADMIN — EPHEDRINE SULFATE 10 MG: 50 INJECTION, SOLUTION INTRAMUSCULAR; INTRAVENOUS; SUBCUTANEOUS at 12:04

## 2017-05-11 RX ADMIN — CEFAZOLIN SODIUM 2000 MG: 2 SOLUTION INTRAVENOUS at 11:05

## 2017-05-11 RX ADMIN — ALBUMIN HUMAN: 0.05 INJECTION, SOLUTION INTRAVENOUS at 13:10

## 2017-05-11 RX ADMIN — PROPOFOL 150 MG: 10 INJECTION, EMULSION INTRAVENOUS at 11:02

## 2017-05-11 RX ADMIN — CEFAZOLIN SODIUM 2000 MG: 2 SOLUTION INTRAVENOUS at 23:39

## 2017-05-11 RX ADMIN — Medication: at 16:27

## 2017-05-11 RX ADMIN — SODIUM CHLORIDE, SODIUM LACTATE, POTASSIUM CHLORIDE, AND CALCIUM CHLORIDE: .6; .31; .03; .02 INJECTION, SOLUTION INTRAVENOUS at 15:42

## 2017-05-11 RX ADMIN — ONDANSETRON 4 MG: 2 INJECTION INTRAMUSCULAR; INTRAVENOUS at 14:19

## 2017-05-11 RX ADMIN — ROPIVACAINE HYDROCHLORIDE 6 ML: 2 INJECTION, SOLUTION EPIDURAL; INFILTRATION at 11:50

## 2017-05-12 LAB
ALBUMIN SERPL BCP-MCNC: 2.8 G/DL (ref 3.5–5)
ALP SERPL-CCNC: 59 U/L (ref 46–116)
ALT SERPL W P-5'-P-CCNC: 110 U/L (ref 12–78)
ANION GAP SERPL CALCULATED.3IONS-SCNC: 7 MMOL/L (ref 4–13)
AST SERPL W P-5'-P-CCNC: 93 U/L (ref 5–45)
BILIRUB SERPL-MCNC: 0.67 MG/DL (ref 0.2–1)
BUN SERPL-MCNC: 10 MG/DL (ref 5–25)
CALCIUM SERPL-MCNC: 8 MG/DL (ref 8.3–10.1)
CHLORIDE SERPL-SCNC: 109 MMOL/L (ref 100–108)
CO2 SERPL-SCNC: 25 MMOL/L (ref 21–32)
CREAT SERPL-MCNC: 0.76 MG/DL (ref 0.6–1.3)
ERYTHROCYTE [DISTWIDTH] IN BLOOD BY AUTOMATED COUNT: 13.9 % (ref 11.6–15.1)
GFR SERPL CREATININE-BSD FRML MDRD: >60 ML/MIN/1.73SQ M
GLUCOSE SERPL-MCNC: 151 MG/DL (ref 65–140)
HCT VFR BLD AUTO: 33.1 % (ref 34.8–46.1)
HGB BLD-MCNC: 10.6 G/DL (ref 11.5–15.4)
MCH RBC QN AUTO: 29.4 PG (ref 26.8–34.3)
MCHC RBC AUTO-ENTMCNC: 32 G/DL (ref 31.4–37.4)
MCV RBC AUTO: 92 FL (ref 82–98)
PLATELET # BLD AUTO: 159 THOUSANDS/UL (ref 149–390)
PMV BLD AUTO: 11.8 FL (ref 8.9–12.7)
POTASSIUM SERPL-SCNC: 4 MMOL/L (ref 3.5–5.3)
PROT SERPL-MCNC: 5.3 G/DL (ref 6.4–8.2)
RBC # BLD AUTO: 3.6 MILLION/UL (ref 3.81–5.12)
SODIUM SERPL-SCNC: 141 MMOL/L (ref 136–145)
WBC # BLD AUTO: 10.11 THOUSAND/UL (ref 4.31–10.16)

## 2017-05-12 PROCEDURE — 80053 COMPREHEN METABOLIC PANEL: CPT | Performed by: OBSTETRICS & GYNECOLOGY

## 2017-05-12 PROCEDURE — 94762 N-INVAS EAR/PLS OXIMTRY CONT: CPT

## 2017-05-12 PROCEDURE — C9113 INJ PANTOPRAZOLE SODIUM, VIA: HCPCS | Performed by: OBSTETRICS & GYNECOLOGY

## 2017-05-12 PROCEDURE — 85027 COMPLETE CBC AUTOMATED: CPT | Performed by: OBSTETRICS & GYNECOLOGY

## 2017-05-12 RX ORDER — HEPARIN SODIUM 5000 [USP'U]/ML
5000 INJECTION, SOLUTION INTRAVENOUS; SUBCUTANEOUS EVERY 8 HOURS SCHEDULED
Status: COMPLETED | OUTPATIENT
Start: 2017-05-12 | End: 2017-05-12

## 2017-05-12 RX ORDER — PANTOPRAZOLE SODIUM 40 MG/1
40 INJECTION, POWDER, FOR SOLUTION INTRAVENOUS EVERY 12 HOURS SCHEDULED
Status: DISCONTINUED | OUTPATIENT
Start: 2017-05-12 | End: 2017-05-16

## 2017-05-12 RX ORDER — HEPARIN SODIUM 5000 [USP'U]/ML
5000 INJECTION, SOLUTION INTRAVENOUS; SUBCUTANEOUS EVERY 8 HOURS SCHEDULED
Status: DISCONTINUED | OUTPATIENT
Start: 2017-05-12 | End: 2017-05-12

## 2017-05-12 RX ORDER — ATENOLOL 50 MG/1
50 TABLET ORAL DAILY
Status: DISCONTINUED | OUTPATIENT
Start: 2017-05-12 | End: 2017-05-16 | Stop reason: HOSPADM

## 2017-05-12 RX ADMIN — HEPARIN SODIUM 5000 UNITS: 5000 INJECTION, SOLUTION INTRAVENOUS; SUBCUTANEOUS at 13:25

## 2017-05-12 RX ADMIN — Medication: at 21:20

## 2017-05-12 RX ADMIN — DEXTROSE, SODIUM CHLORIDE, AND POTASSIUM CHLORIDE 100 ML/HR: 5; .45; .15 INJECTION INTRAVENOUS at 13:28

## 2017-05-12 RX ADMIN — CEFAZOLIN SODIUM 2000 MG: 2 SOLUTION INTRAVENOUS at 06:31

## 2017-05-12 RX ADMIN — PANTOPRAZOLE SODIUM 40 MG: 40 INJECTION, POWDER, FOR SOLUTION INTRAVENOUS at 06:31

## 2017-05-12 RX ADMIN — HEPARIN SODIUM 5000 UNITS: 5000 INJECTION, SOLUTION INTRAVENOUS; SUBCUTANEOUS at 06:31

## 2017-05-12 RX ADMIN — DEXTROSE, SODIUM CHLORIDE, AND POTASSIUM CHLORIDE 125 ML/HR: 5; .45; .15 INJECTION INTRAVENOUS at 02:35

## 2017-05-12 RX ADMIN — METRONIDAZOLE 500 MG: 500 SOLUTION INTRAVENOUS at 12:47

## 2017-05-12 RX ADMIN — METRONIDAZOLE 500 MG: 500 SOLUTION INTRAVENOUS at 05:28

## 2017-05-12 RX ADMIN — PANTOPRAZOLE SODIUM 40 MG: 40 INJECTION, POWDER, FOR SOLUTION INTRAVENOUS at 17:12

## 2017-05-12 RX ADMIN — ONDANSETRON 4 MG: 2 INJECTION INTRAMUSCULAR; INTRAVENOUS at 08:06

## 2017-05-12 RX ADMIN — CEFAZOLIN SODIUM 2000 MG: 2 SOLUTION INTRAVENOUS at 16:05

## 2017-05-12 RX ADMIN — HEPARIN SODIUM 5000 UNITS: 5000 INJECTION, SOLUTION INTRAVENOUS; SUBCUTANEOUS at 21:11

## 2017-05-13 LAB
ANION GAP SERPL CALCULATED.3IONS-SCNC: 6 MMOL/L (ref 4–13)
BUN SERPL-MCNC: 5 MG/DL (ref 5–25)
CALCIUM SERPL-MCNC: 8.2 MG/DL (ref 8.3–10.1)
CHLORIDE SERPL-SCNC: 110 MMOL/L (ref 100–108)
CO2 SERPL-SCNC: 25 MMOL/L (ref 21–32)
CREAT SERPL-MCNC: 0.6 MG/DL (ref 0.6–1.3)
ERYTHROCYTE [DISTWIDTH] IN BLOOD BY AUTOMATED COUNT: 14.3 % (ref 11.6–15.1)
GFR SERPL CREATININE-BSD FRML MDRD: >60 ML/MIN/1.73SQ M
GLUCOSE SERPL-MCNC: 127 MG/DL (ref 65–140)
HCT VFR BLD AUTO: 30.6 % (ref 34.8–46.1)
HGB BLD-MCNC: 9.7 G/DL (ref 11.5–15.4)
MAGNESIUM SERPL-MCNC: 2 MG/DL (ref 1.6–2.6)
MCH RBC QN AUTO: 29.7 PG (ref 26.8–34.3)
MCHC RBC AUTO-ENTMCNC: 31.7 G/DL (ref 31.4–37.4)
MCV RBC AUTO: 94 FL (ref 82–98)
PLATELET # BLD AUTO: 135 THOUSANDS/UL (ref 149–390)
PMV BLD AUTO: 12.7 FL (ref 8.9–12.7)
POTASSIUM SERPL-SCNC: 4.1 MMOL/L (ref 3.5–5.3)
RBC # BLD AUTO: 3.27 MILLION/UL (ref 3.81–5.12)
SODIUM SERPL-SCNC: 141 MMOL/L (ref 136–145)
WBC # BLD AUTO: 9.51 THOUSAND/UL (ref 4.31–10.16)

## 2017-05-13 PROCEDURE — C9113 INJ PANTOPRAZOLE SODIUM, VIA: HCPCS | Performed by: OBSTETRICS & GYNECOLOGY

## 2017-05-13 PROCEDURE — 94760 N-INVAS EAR/PLS OXIMETRY 1: CPT

## 2017-05-13 PROCEDURE — 83735 ASSAY OF MAGNESIUM: CPT | Performed by: OBSTETRICS & GYNECOLOGY

## 2017-05-13 PROCEDURE — 80048 BASIC METABOLIC PNL TOTAL CA: CPT | Performed by: OBSTETRICS & GYNECOLOGY

## 2017-05-13 PROCEDURE — 94762 N-INVAS EAR/PLS OXIMTRY CONT: CPT

## 2017-05-13 PROCEDURE — 85027 COMPLETE CBC AUTOMATED: CPT | Performed by: OBSTETRICS & GYNECOLOGY

## 2017-05-13 RX ORDER — LIDOCAINE 40 MG/G
CREAM TOPICAL ONCE
Status: COMPLETED | OUTPATIENT
Start: 2017-05-13 | End: 2017-05-13

## 2017-05-13 RX ADMIN — DEXTROSE, SODIUM CHLORIDE, AND POTASSIUM CHLORIDE 100 ML/HR: 5; .45; .15 INJECTION INTRAVENOUS at 11:10

## 2017-05-13 RX ADMIN — ATENOLOL 50 MG: 50 TABLET ORAL at 08:42

## 2017-05-13 RX ADMIN — HYDROMORPHONE HYDROCHLORIDE 0.5 MG: 1 INJECTION, SOLUTION INTRAMUSCULAR; INTRAVENOUS; SUBCUTANEOUS at 23:34

## 2017-05-13 RX ADMIN — HYDROMORPHONE HYDROCHLORIDE 0.5 MG: 1 INJECTION, SOLUTION INTRAMUSCULAR; INTRAVENOUS; SUBCUTANEOUS at 14:19

## 2017-05-13 RX ADMIN — HYDROMORPHONE HYDROCHLORIDE 0.5 MG: 1 INJECTION, SOLUTION INTRAMUSCULAR; INTRAVENOUS; SUBCUTANEOUS at 01:27

## 2017-05-13 RX ADMIN — Medication: at 21:24

## 2017-05-13 RX ADMIN — DEXTROSE, SODIUM CHLORIDE, AND POTASSIUM CHLORIDE 100 ML/HR: 5; .45; .15 INJECTION INTRAVENOUS at 00:30

## 2017-05-13 RX ADMIN — LIDOCAINE 4%: 4 CREAM TOPICAL at 17:56

## 2017-05-13 RX ADMIN — PANTOPRAZOLE SODIUM 40 MG: 40 INJECTION, POWDER, FOR SOLUTION INTRAVENOUS at 22:31

## 2017-05-13 RX ADMIN — PANTOPRAZOLE SODIUM 40 MG: 40 INJECTION, POWDER, FOR SOLUTION INTRAVENOUS at 05:40

## 2017-05-14 ENCOUNTER — APPOINTMENT (INPATIENT)
Dept: RADIOLOGY | Facility: HOSPITAL | Age: 72
DRG: 330 | End: 2017-05-14
Payer: MEDICARE

## 2017-05-14 LAB
ABO GROUP BLD BPU: NORMAL
ABO GROUP BLD BPU: NORMAL
ANION GAP SERPL CALCULATED.3IONS-SCNC: 7 MMOL/L (ref 4–13)
BPU ID: NORMAL
BPU ID: NORMAL
BUN SERPL-MCNC: 4 MG/DL (ref 5–25)
CALCIUM SERPL-MCNC: 8.1 MG/DL (ref 8.3–10.1)
CHLORIDE SERPL-SCNC: 106 MMOL/L (ref 100–108)
CO2 SERPL-SCNC: 27 MMOL/L (ref 21–32)
CREAT SERPL-MCNC: 0.5 MG/DL (ref 0.6–1.3)
CROSSMATCH: NORMAL
CROSSMATCH: NORMAL
ERYTHROCYTE [DISTWIDTH] IN BLOOD BY AUTOMATED COUNT: 13.9 % (ref 11.6–15.1)
GFR SERPL CREATININE-BSD FRML MDRD: >60 ML/MIN/1.73SQ M
GLUCOSE SERPL-MCNC: 155 MG/DL (ref 65–140)
HCT VFR BLD AUTO: 30.5 % (ref 34.8–46.1)
HGB BLD-MCNC: 9.6 G/DL (ref 11.5–15.4)
MAGNESIUM SERPL-MCNC: 1.9 MG/DL (ref 1.6–2.6)
MCH RBC QN AUTO: 29.1 PG (ref 26.8–34.3)
MCHC RBC AUTO-ENTMCNC: 31.5 G/DL (ref 31.4–37.4)
MCV RBC AUTO: 92 FL (ref 82–98)
PLATELET # BLD AUTO: 167 THOUSANDS/UL (ref 149–390)
PMV BLD AUTO: 11.7 FL (ref 8.9–12.7)
POTASSIUM SERPL-SCNC: 3.8 MMOL/L (ref 3.5–5.3)
RBC # BLD AUTO: 3.3 MILLION/UL (ref 3.81–5.12)
SODIUM SERPL-SCNC: 140 MMOL/L (ref 136–145)
UNIT DISPENSE STATUS: NORMAL
UNIT DISPENSE STATUS: NORMAL
UNIT PRODUCT CODE: NORMAL
UNIT PRODUCT CODE: NORMAL
UNIT RH: NORMAL
UNIT RH: NORMAL
WBC # BLD AUTO: 9.56 THOUSAND/UL (ref 4.31–10.16)

## 2017-05-14 PROCEDURE — 85027 COMPLETE CBC AUTOMATED: CPT | Performed by: OBSTETRICS & GYNECOLOGY

## 2017-05-14 PROCEDURE — 94762 N-INVAS EAR/PLS OXIMTRY CONT: CPT

## 2017-05-14 PROCEDURE — 80048 BASIC METABOLIC PNL TOTAL CA: CPT | Performed by: OBSTETRICS & GYNECOLOGY

## 2017-05-14 PROCEDURE — C9113 INJ PANTOPRAZOLE SODIUM, VIA: HCPCS | Performed by: OBSTETRICS & GYNECOLOGY

## 2017-05-14 PROCEDURE — 83735 ASSAY OF MAGNESIUM: CPT | Performed by: OBSTETRICS & GYNECOLOGY

## 2017-05-14 PROCEDURE — 74000 HB X-RAY EXAM OF ABDOMEN (SINGLE ANTEROPOSTERIOR VIEW): CPT

## 2017-05-14 RX ORDER — DEXTROSE, SODIUM CHLORIDE, AND POTASSIUM CHLORIDE 5; .45; .15 G/100ML; G/100ML; G/100ML
125 INJECTION INTRAVENOUS CONTINUOUS
Status: DISCONTINUED | OUTPATIENT
Start: 2017-05-14 | End: 2017-05-16

## 2017-05-14 RX ORDER — OXYCODONE HYDROCHLORIDE 5 MG/1
5 TABLET ORAL EVERY 4 HOURS PRN
Status: DISCONTINUED | OUTPATIENT
Start: 2017-05-14 | End: 2017-05-16 | Stop reason: HOSPADM

## 2017-05-14 RX ORDER — DIPHENHYDRAMINE HYDROCHLORIDE 50 MG/ML
25 INJECTION INTRAMUSCULAR; INTRAVENOUS EVERY 6 HOURS PRN
Status: DISCONTINUED | OUTPATIENT
Start: 2017-05-14 | End: 2017-05-14 | Stop reason: SDUPTHER

## 2017-05-14 RX ORDER — KETOROLAC TROMETHAMINE 30 MG/ML
15 INJECTION, SOLUTION INTRAMUSCULAR; INTRAVENOUS EVERY 6 HOURS SCHEDULED
Status: COMPLETED | OUTPATIENT
Start: 2017-05-14 | End: 2017-05-15

## 2017-05-14 RX ORDER — PANTOPRAZOLE SODIUM 40 MG/1
40 INJECTION, POWDER, FOR SOLUTION INTRAVENOUS
Status: DISCONTINUED | OUTPATIENT
Start: 2017-05-15 | End: 2017-05-14

## 2017-05-14 RX ORDER — METOCLOPRAMIDE HYDROCHLORIDE 5 MG/ML
10 INJECTION INTRAMUSCULAR; INTRAVENOUS EVERY 6 HOURS PRN
Status: DISCONTINUED | OUTPATIENT
Start: 2017-05-14 | End: 2017-05-16 | Stop reason: HOSPADM

## 2017-05-14 RX ORDER — OXYCODONE HYDROCHLORIDE 10 MG/1
10 TABLET ORAL EVERY 4 HOURS PRN
Status: DISCONTINUED | OUTPATIENT
Start: 2017-05-14 | End: 2017-05-16 | Stop reason: HOSPADM

## 2017-05-14 RX ADMIN — ONDANSETRON 4 MG: 2 INJECTION INTRAMUSCULAR; INTRAVENOUS at 14:38

## 2017-05-14 RX ADMIN — PANTOPRAZOLE SODIUM 40 MG: 40 INJECTION, POWDER, FOR SOLUTION INTRAVENOUS at 17:14

## 2017-05-14 RX ADMIN — DEXTROSE, SODIUM CHLORIDE, AND POTASSIUM CHLORIDE 125 ML/HR: 5; .45; .15 INJECTION INTRAVENOUS at 11:15

## 2017-05-14 RX ADMIN — ATENOLOL 50 MG: 50 TABLET ORAL at 09:11

## 2017-05-14 RX ADMIN — KETOROLAC TROMETHAMINE 15 MG: 30 INJECTION, SOLUTION INTRAMUSCULAR at 11:18

## 2017-05-14 RX ADMIN — HYDROMORPHONE HYDROCHLORIDE 0.5 MG: 1 INJECTION, SOLUTION INTRAMUSCULAR; INTRAVENOUS; SUBCUTANEOUS at 07:52

## 2017-05-14 RX ADMIN — DEXTROSE, SODIUM CHLORIDE, AND POTASSIUM CHLORIDE 125 ML/HR: 5; .45; .15 INJECTION INTRAVENOUS at 11:23

## 2017-05-14 RX ADMIN — METOCLOPRAMIDE 10 MG: 5 INJECTION, SOLUTION INTRAMUSCULAR; INTRAVENOUS at 18:17

## 2017-05-14 RX ADMIN — PANTOPRAZOLE SODIUM 40 MG: 40 INJECTION, POWDER, FOR SOLUTION INTRAVENOUS at 05:47

## 2017-05-14 RX ADMIN — ONDANSETRON 4 MG: 2 INJECTION INTRAMUSCULAR; INTRAVENOUS at 05:48

## 2017-05-14 RX ADMIN — METOCLOPRAMIDE 10 MG: 5 INJECTION, SOLUTION INTRAMUSCULAR; INTRAVENOUS at 07:39

## 2017-05-14 RX ADMIN — Medication: at 07:39

## 2017-05-14 RX ADMIN — HYDROMORPHONE HYDROCHLORIDE 0.5 MG: 1 INJECTION, SOLUTION INTRAMUSCULAR; INTRAVENOUS; SUBCUTANEOUS at 05:47

## 2017-05-14 RX ADMIN — HYDROMORPHONE HYDROCHLORIDE 0.5 MG: 1 INJECTION, SOLUTION INTRAMUSCULAR; INTRAVENOUS; SUBCUTANEOUS at 03:38

## 2017-05-14 RX ADMIN — ENOXAPARIN SODIUM 40 MG: 40 INJECTION SUBCUTANEOUS at 16:54

## 2017-05-14 RX ADMIN — HYDROMORPHONE HYDROCHLORIDE 1 MG: 1 INJECTION, SOLUTION INTRAMUSCULAR; INTRAVENOUS; SUBCUTANEOUS at 17:14

## 2017-05-14 RX ADMIN — HYDROMORPHONE HYDROCHLORIDE 0.5 MG: 1 INJECTION, SOLUTION INTRAMUSCULAR; INTRAVENOUS; SUBCUTANEOUS at 01:19

## 2017-05-14 RX ADMIN — DEXTROSE, SODIUM CHLORIDE, AND POTASSIUM CHLORIDE 100 ML/HR: 5; .45; .15 INJECTION INTRAVENOUS at 03:38

## 2017-05-14 RX ADMIN — DEXTROSE, SODIUM CHLORIDE, AND POTASSIUM CHLORIDE 125 ML/HR: 5; .45; .15 INJECTION INTRAVENOUS at 17:15

## 2017-05-14 RX ADMIN — HYDROMORPHONE HYDROCHLORIDE 1 MG: 1 INJECTION, SOLUTION INTRAMUSCULAR; INTRAVENOUS; SUBCUTANEOUS at 14:19

## 2017-05-14 RX ADMIN — OXYCODONE HYDROCHLORIDE 10 MG: 10 TABLET ORAL at 13:12

## 2017-05-14 RX ADMIN — KETOROLAC TROMETHAMINE 15 MG: 30 INJECTION, SOLUTION INTRAMUSCULAR at 17:14

## 2017-05-15 LAB
ANION GAP SERPL CALCULATED.3IONS-SCNC: 7 MMOL/L (ref 4–13)
BUN SERPL-MCNC: 3 MG/DL (ref 5–25)
CALCIUM SERPL-MCNC: 8.6 MG/DL (ref 8.3–10.1)
CHLORIDE SERPL-SCNC: 107 MMOL/L (ref 100–108)
CO2 SERPL-SCNC: 27 MMOL/L (ref 21–32)
CREAT FLD-MCNC: 0.4 MG/DL
CREAT SERPL-MCNC: 0.52 MG/DL (ref 0.6–1.3)
ERYTHROCYTE [DISTWIDTH] IN BLOOD BY AUTOMATED COUNT: 13.9 % (ref 11.6–15.1)
GFR SERPL CREATININE-BSD FRML MDRD: >60 ML/MIN/1.73SQ M
GLUCOSE SERPL-MCNC: 128 MG/DL (ref 65–140)
HCT VFR BLD AUTO: 34.1 % (ref 34.8–46.1)
HGB BLD-MCNC: 10.8 G/DL (ref 11.5–15.4)
MAGNESIUM SERPL-MCNC: 2 MG/DL (ref 1.6–2.6)
MCH RBC QN AUTO: 29.3 PG (ref 26.8–34.3)
MCHC RBC AUTO-ENTMCNC: 31.7 G/DL (ref 31.4–37.4)
MCV RBC AUTO: 93 FL (ref 82–98)
PLATELET # BLD AUTO: 200 THOUSANDS/UL (ref 149–390)
PMV BLD AUTO: 11.7 FL (ref 8.9–12.7)
POTASSIUM SERPL-SCNC: 4 MMOL/L (ref 3.5–5.3)
RBC # BLD AUTO: 3.68 MILLION/UL (ref 3.81–5.12)
SODIUM SERPL-SCNC: 141 MMOL/L (ref 136–145)
WBC # BLD AUTO: 10.91 THOUSAND/UL (ref 4.31–10.16)

## 2017-05-15 PROCEDURE — 82570 ASSAY OF URINE CREATININE: CPT | Performed by: OBSTETRICS & GYNECOLOGY

## 2017-05-15 PROCEDURE — 85027 COMPLETE CBC AUTOMATED: CPT | Performed by: OBSTETRICS & GYNECOLOGY

## 2017-05-15 PROCEDURE — C9113 INJ PANTOPRAZOLE SODIUM, VIA: HCPCS | Performed by: OBSTETRICS & GYNECOLOGY

## 2017-05-15 PROCEDURE — 83735 ASSAY OF MAGNESIUM: CPT | Performed by: OBSTETRICS & GYNECOLOGY

## 2017-05-15 PROCEDURE — 80048 BASIC METABOLIC PNL TOTAL CA: CPT | Performed by: OBSTETRICS & GYNECOLOGY

## 2017-05-15 RX ORDER — OXYCODONE HYDROCHLORIDE 5 MG/1
TABLET ORAL
Qty: 20 TABLET | Refills: 0 | Status: SHIPPED | OUTPATIENT
Start: 2017-05-15 | End: 2017-05-21

## 2017-05-15 RX ORDER — IBUPROFEN 600 MG/1
600 TABLET ORAL EVERY 6 HOURS PRN
Status: DISCONTINUED | OUTPATIENT
Start: 2017-05-15 | End: 2017-05-16 | Stop reason: HOSPADM

## 2017-05-15 RX ADMIN — PANTOPRAZOLE SODIUM 40 MG: 40 INJECTION, POWDER, FOR SOLUTION INTRAVENOUS at 17:39

## 2017-05-15 RX ADMIN — DEXTROSE, SODIUM CHLORIDE, AND POTASSIUM CHLORIDE 125 ML/HR: 5; .45; .15 INJECTION INTRAVENOUS at 00:33

## 2017-05-15 RX ADMIN — DEXTROSE, SODIUM CHLORIDE, AND POTASSIUM CHLORIDE 125 ML/HR: 5; .45; .15 INJECTION INTRAVENOUS at 16:46

## 2017-05-15 RX ADMIN — DEXTROSE, SODIUM CHLORIDE, AND POTASSIUM CHLORIDE 125 ML/HR: 5; .45; .15 INJECTION INTRAVENOUS at 08:34

## 2017-05-15 RX ADMIN — ENOXAPARIN SODIUM 40 MG: 40 INJECTION SUBCUTANEOUS at 08:22

## 2017-05-15 RX ADMIN — KETOROLAC TROMETHAMINE 15 MG: 30 INJECTION, SOLUTION INTRAMUSCULAR at 05:47

## 2017-05-15 RX ADMIN — KETOROLAC TROMETHAMINE 15 MG: 30 INJECTION, SOLUTION INTRAMUSCULAR at 00:40

## 2017-05-15 RX ADMIN — METOCLOPRAMIDE 10 MG: 5 INJECTION, SOLUTION INTRAMUSCULAR; INTRAVENOUS at 00:45

## 2017-05-15 RX ADMIN — ATENOLOL 50 MG: 50 TABLET ORAL at 08:22

## 2017-05-15 RX ADMIN — PANTOPRAZOLE SODIUM 40 MG: 40 INJECTION, POWDER, FOR SOLUTION INTRAVENOUS at 05:52

## 2017-05-16 VITALS
WEIGHT: 194.67 LBS | OXYGEN SATURATION: 94 % | TEMPERATURE: 98.8 F | SYSTOLIC BLOOD PRESSURE: 142 MMHG | BODY MASS INDEX: 36.75 KG/M2 | DIASTOLIC BLOOD PRESSURE: 62 MMHG | RESPIRATION RATE: 18 BRPM | HEIGHT: 61 IN | HEART RATE: 75 BPM

## 2017-05-16 LAB
ANION GAP SERPL CALCULATED.3IONS-SCNC: 6 MMOL/L (ref 4–13)
BUN SERPL-MCNC: 3 MG/DL (ref 5–25)
CALCIUM SERPL-MCNC: 8.9 MG/DL (ref 8.3–10.1)
CHLORIDE SERPL-SCNC: 106 MMOL/L (ref 100–108)
CO2 SERPL-SCNC: 31 MMOL/L (ref 21–32)
CREAT SERPL-MCNC: 0.59 MG/DL (ref 0.6–1.3)
ERYTHROCYTE [DISTWIDTH] IN BLOOD BY AUTOMATED COUNT: 14.1 % (ref 11.6–15.1)
GFR SERPL CREATININE-BSD FRML MDRD: >60 ML/MIN/1.73SQ M
GLUCOSE SERPL-MCNC: 116 MG/DL (ref 65–140)
HCT VFR BLD AUTO: 31.9 % (ref 34.8–46.1)
HGB BLD-MCNC: 10.3 G/DL (ref 11.5–15.4)
MCH RBC QN AUTO: 29.9 PG (ref 26.8–34.3)
MCHC RBC AUTO-ENTMCNC: 32.3 G/DL (ref 31.4–37.4)
MCV RBC AUTO: 93 FL (ref 82–98)
PLATELET # BLD AUTO: 227 THOUSANDS/UL (ref 149–390)
PMV BLD AUTO: 11.9 FL (ref 8.9–12.7)
POTASSIUM SERPL-SCNC: 3.7 MMOL/L (ref 3.5–5.3)
RBC # BLD AUTO: 3.44 MILLION/UL (ref 3.81–5.12)
SODIUM SERPL-SCNC: 143 MMOL/L (ref 136–145)
WBC # BLD AUTO: 8.47 THOUSAND/UL (ref 4.31–10.16)

## 2017-05-16 PROCEDURE — 85027 COMPLETE CBC AUTOMATED: CPT | Performed by: OBSTETRICS & GYNECOLOGY

## 2017-05-16 PROCEDURE — C9113 INJ PANTOPRAZOLE SODIUM, VIA: HCPCS | Performed by: OBSTETRICS & GYNECOLOGY

## 2017-05-16 PROCEDURE — 80048 BASIC METABOLIC PNL TOTAL CA: CPT | Performed by: OBSTETRICS & GYNECOLOGY

## 2017-05-16 RX ORDER — DOCUSATE SODIUM 100 MG/1
100 CAPSULE, LIQUID FILLED ORAL 2 TIMES DAILY
Status: DISCONTINUED | OUTPATIENT
Start: 2017-05-16 | End: 2017-05-16 | Stop reason: HOSPADM

## 2017-05-16 RX ORDER — FAMOTIDINE 20 MG/1
20 TABLET, FILM COATED ORAL 2 TIMES DAILY
Status: DISCONTINUED | OUTPATIENT
Start: 2017-05-16 | End: 2017-05-16 | Stop reason: HOSPADM

## 2017-05-16 RX ORDER — DOCUSATE SODIUM 100 MG/1
100 CAPSULE, LIQUID FILLED ORAL 2 TIMES DAILY
Qty: 60 CAPSULE | Refills: 0 | Status: SHIPPED | OUTPATIENT
Start: 2017-05-16 | End: 2017-05-21

## 2017-05-16 RX ORDER — IBUPROFEN 600 MG/1
600 TABLET ORAL EVERY 6 HOURS PRN
Qty: 30 TABLET | Refills: 0 | Status: SHIPPED | OUTPATIENT
Start: 2017-05-16 | End: 2017-10-13 | Stop reason: ALTCHOICE

## 2017-05-16 RX ADMIN — MENTHOL 5.4 MG: 5.4 LOZENGE ORAL at 12:24

## 2017-05-16 RX ADMIN — DEXTROSE, SODIUM CHLORIDE, AND POTASSIUM CHLORIDE 125 ML/HR: 5; .45; .15 INJECTION INTRAVENOUS at 00:52

## 2017-05-16 RX ADMIN — DOCUSATE SODIUM 100 MG: 100 CAPSULE, LIQUID FILLED ORAL at 08:58

## 2017-05-16 RX ADMIN — ATENOLOL 50 MG: 50 TABLET ORAL at 08:58

## 2017-05-16 RX ADMIN — PANTOPRAZOLE SODIUM 40 MG: 40 INJECTION, POWDER, FOR SOLUTION INTRAVENOUS at 05:00

## 2017-05-16 RX ADMIN — IBUPROFEN 600 MG: 600 TABLET, FILM COATED ORAL at 10:22

## 2017-05-16 RX ADMIN — FAMOTIDINE 20 MG: 20 TABLET ORAL at 08:58

## 2017-05-16 RX ADMIN — ENOXAPARIN SODIUM 40 MG: 40 INJECTION SUBCUTANEOUS at 08:59

## 2017-05-21 ENCOUNTER — APPOINTMENT (EMERGENCY)
Dept: CT IMAGING | Facility: HOSPITAL | Age: 72
End: 2017-05-21
Payer: MEDICARE

## 2017-05-21 ENCOUNTER — HOSPITAL ENCOUNTER (EMERGENCY)
Facility: HOSPITAL | Age: 72
Discharge: HOME | End: 2017-05-21
Attending: EMERGENCY MEDICINE | Admitting: EMERGENCY MEDICINE
Payer: MEDICARE

## 2017-05-21 VITALS
HEIGHT: 60 IN | TEMPERATURE: 98.5 F | WEIGHT: 191.8 LBS | OXYGEN SATURATION: 97 % | BODY MASS INDEX: 37.66 KG/M2 | HEART RATE: 61 BPM | RESPIRATION RATE: 16 BRPM | SYSTOLIC BLOOD PRESSURE: 169 MMHG | DIASTOLIC BLOOD PRESSURE: 77 MMHG

## 2017-05-21 DIAGNOSIS — Z09 FOLLOW-UP EXAMINATION AFTER ABDOMINAL SURGERY: ICD-10-CM

## 2017-05-21 DIAGNOSIS — K59.00 CONSTIPATION: Primary | ICD-10-CM

## 2017-05-21 LAB
ALBUMIN SERPL BCP-MCNC: 2.7 G/DL (ref 3.5–5)
ALP SERPL-CCNC: 88 U/L (ref 46–116)
ALT SERPL W P-5'-P-CCNC: 26 U/L (ref 12–78)
ANION GAP SERPL CALCULATED.3IONS-SCNC: 7 MMOL/L (ref 4–13)
APTT PPP: 29 SECONDS (ref 23–35)
AST SERPL W P-5'-P-CCNC: 24 U/L (ref 5–45)
BASOPHILS # BLD AUTO: 0.05 THOUSANDS/ΜL (ref 0–0.1)
BASOPHILS NFR BLD AUTO: 1 % (ref 0–1)
BILIRUB SERPL-MCNC: 0.3 MG/DL (ref 0.2–1)
BILIRUB UR QL STRIP: NEGATIVE
BUN SERPL-MCNC: 6 MG/DL (ref 5–25)
CALCIUM SERPL-MCNC: 9.2 MG/DL (ref 8.3–10.1)
CHLORIDE SERPL-SCNC: 103 MMOL/L (ref 100–108)
CLARITY UR: CLEAR
CO2 SERPL-SCNC: 30 MMOL/L (ref 21–32)
COLOR UR: YELLOW
CREAT SERPL-MCNC: 0.73 MG/DL (ref 0.6–1.3)
EOSINOPHIL # BLD AUTO: 0.17 THOUSAND/ΜL (ref 0–0.61)
EOSINOPHIL NFR BLD AUTO: 2 % (ref 0–6)
ERYTHROCYTE [DISTWIDTH] IN BLOOD BY AUTOMATED COUNT: 13.7 % (ref 11.6–15.1)
GFR SERPL CREATININE-BSD FRML MDRD: >60 ML/MIN/1.73SQ M
GLUCOSE SERPL-MCNC: 110 MG/DL (ref 65–140)
GLUCOSE UR STRIP-MCNC: NEGATIVE MG/DL
HCT VFR BLD AUTO: 34.9 % (ref 34.8–46.1)
HGB BLD-MCNC: 11 G/DL (ref 11.5–15.4)
HGB UR QL STRIP.AUTO: NEGATIVE
INR PPP: 1.11 (ref 0.86–1.16)
KETONES UR STRIP-MCNC: NEGATIVE MG/DL
LEUKOCYTE ESTERASE UR QL STRIP: NEGATIVE
LIPASE SERPL-CCNC: 89 U/L (ref 73–393)
LYMPHOCYTES # BLD AUTO: 1.79 THOUSANDS/ΜL (ref 0.6–4.47)
LYMPHOCYTES NFR BLD AUTO: 20 % (ref 14–44)
MCH RBC QN AUTO: 28.8 PG (ref 26.8–34.3)
MCHC RBC AUTO-ENTMCNC: 31.5 G/DL (ref 31.4–37.4)
MCV RBC AUTO: 91 FL (ref 82–98)
MONOCYTES # BLD AUTO: 0.59 THOUSAND/ΜL (ref 0.17–1.22)
MONOCYTES NFR BLD AUTO: 7 % (ref 4–12)
NEUTROPHILS # BLD AUTO: 6.41 THOUSANDS/ΜL (ref 1.85–7.62)
NEUTS SEG NFR BLD AUTO: 71 % (ref 43–75)
NITRITE UR QL STRIP: NEGATIVE
NRBC BLD AUTO-RTO: 0 /100 WBCS
PH UR STRIP.AUTO: 7.5 [PH] (ref 4.5–8)
PLATELET # BLD AUTO: 308 THOUSANDS/UL (ref 149–390)
PMV BLD AUTO: 10.8 FL (ref 8.9–12.7)
POTASSIUM SERPL-SCNC: 3.8 MMOL/L (ref 3.5–5.3)
PROT SERPL-MCNC: 6.4 G/DL (ref 6.4–8.2)
PROT UR STRIP-MCNC: NEGATIVE MG/DL
PROTHROMBIN TIME: 14.6 SECONDS (ref 12.1–14.4)
RBC # BLD AUTO: 3.82 MILLION/UL (ref 3.81–5.12)
SODIUM SERPL-SCNC: 140 MMOL/L (ref 136–145)
SP GR UR STRIP.AUTO: 1.01 (ref 1–1.03)
UROBILINOGEN UR QL STRIP.AUTO: 0.2 E.U./DL
WBC # BLD AUTO: 9.05 THOUSAND/UL (ref 4.31–10.16)

## 2017-05-21 PROCEDURE — 81003 URINALYSIS AUTO W/O SCOPE: CPT | Performed by: EMERGENCY MEDICINE

## 2017-05-21 PROCEDURE — 85610 PROTHROMBIN TIME: CPT | Performed by: EMERGENCY MEDICINE

## 2017-05-21 PROCEDURE — 80053 COMPREHEN METABOLIC PANEL: CPT | Performed by: EMERGENCY MEDICINE

## 2017-05-21 PROCEDURE — 36415 COLL VENOUS BLD VENIPUNCTURE: CPT | Performed by: EMERGENCY MEDICINE

## 2017-05-21 PROCEDURE — 85025 COMPLETE CBC W/AUTO DIFF WBC: CPT | Performed by: EMERGENCY MEDICINE

## 2017-05-21 PROCEDURE — 74177 CT ABD & PELVIS W/CONTRAST: CPT

## 2017-05-21 PROCEDURE — 85730 THROMBOPLASTIN TIME PARTIAL: CPT | Performed by: EMERGENCY MEDICINE

## 2017-05-21 PROCEDURE — 83690 ASSAY OF LIPASE: CPT | Performed by: EMERGENCY MEDICINE

## 2017-05-21 PROCEDURE — 99284 EMERGENCY DEPT VISIT MOD MDM: CPT

## 2017-05-21 RX ORDER — LACTULOSE 20 G/30ML
20 SOLUTION ORAL 2 TIMES DAILY
Qty: 473 ML | Refills: 0 | Status: SHIPPED | OUTPATIENT
Start: 2017-05-21 | End: 2017-10-13 | Stop reason: ALTCHOICE

## 2017-05-21 RX ORDER — LACTULOSE 20 G/30ML
20 SOLUTION ORAL ONCE
Status: COMPLETED | OUTPATIENT
Start: 2017-05-21 | End: 2017-05-21

## 2017-05-21 RX ADMIN — IOHEXOL 100 ML: 350 INJECTION, SOLUTION INTRAVENOUS at 14:44

## 2017-05-21 RX ADMIN — LACTULOSE 20 G: 10 SOLUTION ORAL at 16:37

## 2017-06-01 ENCOUNTER — ALLSCRIPTS OFFICE VISIT (OUTPATIENT)
Dept: OTHER | Facility: OTHER | Age: 72
End: 2017-06-01

## 2017-06-01 ENCOUNTER — GENERIC CONVERSION - ENCOUNTER (OUTPATIENT)
Dept: OTHER | Facility: OTHER | Age: 72
End: 2017-06-01

## 2017-06-01 DIAGNOSIS — C57.00 MALIGNANT NEOPLASM OF FALLOPIAN TUBE (HCC): ICD-10-CM

## 2017-06-01 DIAGNOSIS — Z86.19 PERSONAL HISTORY OF OTHER INFECTIOUS AND PARASITIC DISEASES: ICD-10-CM

## 2017-06-13 ENCOUNTER — HOSPITAL ENCOUNTER (OUTPATIENT)
Dept: INTERVENTIONAL RADIOLOGY/VASCULAR | Facility: HOSPITAL | Age: 72
Discharge: HOME | End: 2017-06-13
Attending: OBSTETRICS & GYNECOLOGY | Admitting: RADIOLOGY
Payer: MEDICARE

## 2017-06-13 VITALS
SYSTOLIC BLOOD PRESSURE: 146 MMHG | HEART RATE: 60 BPM | DIASTOLIC BLOOD PRESSURE: 65 MMHG | HEIGHT: 60 IN | TEMPERATURE: 97.7 F | RESPIRATION RATE: 20 BRPM | WEIGHT: 190.26 LBS | BODY MASS INDEX: 37.35 KG/M2 | OXYGEN SATURATION: 94 %

## 2017-06-13 DIAGNOSIS — C57.00 MALIGNANT NEOPLASM OF FALLOPIAN TUBE (HCC): ICD-10-CM

## 2017-06-13 LAB
ALBUMIN SERPL BCP-MCNC: 2.9 G/DL (ref 3.5–5)
ALP SERPL-CCNC: 81 U/L (ref 46–116)
ALT SERPL W P-5'-P-CCNC: 35 U/L (ref 12–78)
ANION GAP SERPL CALCULATED.3IONS-SCNC: 7 MMOL/L (ref 4–13)
AST SERPL W P-5'-P-CCNC: 30 U/L (ref 5–45)
BASOPHILS # BLD AUTO: 0.03 THOUSANDS/ΜL (ref 0–0.1)
BASOPHILS NFR BLD AUTO: 0 % (ref 0–1)
BILIRUB SERPL-MCNC: 0.2 MG/DL (ref 0.2–1)
BUN SERPL-MCNC: 20 MG/DL (ref 5–25)
CALCIUM SERPL-MCNC: 8.9 MG/DL (ref 8.3–10.1)
CANCER AG125 SERPL-ACNC: 13.8 U/ML (ref 0–30)
CHLORIDE SERPL-SCNC: 107 MMOL/L (ref 100–108)
CO2 SERPL-SCNC: 28 MMOL/L (ref 21–32)
CREAT SERPL-MCNC: 0.72 MG/DL (ref 0.6–1.3)
EOSINOPHIL # BLD AUTO: 0.41 THOUSAND/ΜL (ref 0–0.61)
EOSINOPHIL NFR BLD AUTO: 4 % (ref 0–6)
ERYTHROCYTE [DISTWIDTH] IN BLOOD BY AUTOMATED COUNT: 13.6 % (ref 11.6–15.1)
GFR SERPL CREATININE-BSD FRML MDRD: >60 ML/MIN/1.73SQ M
GLUCOSE P FAST SERPL-MCNC: 120 MG/DL (ref 65–99)
GLUCOSE SERPL-MCNC: 120 MG/DL (ref 65–140)
HCT VFR BLD AUTO: 35.4 % (ref 34.8–46.1)
HGB BLD-MCNC: 11.4 G/DL (ref 11.5–15.4)
LYMPHOCYTES # BLD AUTO: 4.81 THOUSANDS/ΜL (ref 0.6–4.47)
LYMPHOCYTES NFR BLD AUTO: 48 % (ref 14–44)
MAGNESIUM SERPL-MCNC: 1.8 MG/DL (ref 1.6–2.6)
MCH RBC QN AUTO: 29.2 PG (ref 26.8–34.3)
MCHC RBC AUTO-ENTMCNC: 32.2 G/DL (ref 31.4–37.4)
MCV RBC AUTO: 91 FL (ref 82–98)
MONOCYTES # BLD AUTO: 0.7 THOUSAND/ΜL (ref 0.17–1.22)
MONOCYTES NFR BLD AUTO: 7 % (ref 4–12)
NEUTROPHILS # BLD AUTO: 3.99 THOUSANDS/ΜL (ref 1.85–7.62)
NEUTS SEG NFR BLD AUTO: 40 % (ref 43–75)
NRBC BLD AUTO-RTO: 0 /100 WBCS
PLATELET # BLD AUTO: 169 THOUSANDS/UL (ref 149–390)
PMV BLD AUTO: 11.8 FL (ref 8.9–12.7)
POTASSIUM SERPL-SCNC: 3.9 MMOL/L (ref 3.5–5.3)
PROT SERPL-MCNC: 6.2 G/DL (ref 6.4–8.2)
RBC # BLD AUTO: 3.91 MILLION/UL (ref 3.81–5.12)
SODIUM SERPL-SCNC: 142 MMOL/L (ref 136–145)
WBC # BLD AUTO: 9.99 THOUSAND/UL (ref 4.31–10.16)

## 2017-06-13 PROCEDURE — 80053 COMPREHEN METABOLIC PANEL: CPT | Performed by: OBSTETRICS & GYNECOLOGY

## 2017-06-13 PROCEDURE — 86304 IMMUNOASSAY TUMOR CA 125: CPT | Performed by: OBSTETRICS & GYNECOLOGY

## 2017-06-13 PROCEDURE — 77001 FLUOROGUIDE FOR VEIN DEVICE: CPT

## 2017-06-13 PROCEDURE — 85025 COMPLETE CBC W/AUTO DIFF WBC: CPT | Performed by: OBSTETRICS & GYNECOLOGY

## 2017-06-13 PROCEDURE — 76937 US GUIDE VASCULAR ACCESS: CPT

## 2017-06-13 PROCEDURE — C1788 PORT, INDWELLING, IMP: HCPCS

## 2017-06-13 PROCEDURE — 83735 ASSAY OF MAGNESIUM: CPT | Performed by: OBSTETRICS & GYNECOLOGY

## 2017-06-13 PROCEDURE — 36561 INSERT TUNNELED CV CATH: CPT

## 2017-06-13 RX ORDER — SODIUM CHLORIDE 9 MG/ML
50 INJECTION, SOLUTION INTRAVENOUS CONTINUOUS
Status: DISCONTINUED | OUTPATIENT
Start: 2017-06-13 | End: 2017-06-17 | Stop reason: HOSPADM

## 2017-06-13 RX ORDER — ONDANSETRON 2 MG/ML
4 INJECTION INTRAMUSCULAR; INTRAVENOUS EVERY 6 HOURS PRN
Status: DISCONTINUED | OUTPATIENT
Start: 2017-06-13 | End: 2017-06-17 | Stop reason: HOSPADM

## 2017-06-13 RX ORDER — LIDOCAINE HYDROCHLORIDE AND EPINEPHRINE 10; 10 MG/ML; UG/ML
INJECTION, SOLUTION INFILTRATION; PERINEURAL CODE/TRAUMA/SEDATION MEDICATION
Status: COMPLETED | OUTPATIENT
Start: 2017-06-13 | End: 2017-06-13

## 2017-06-13 RX ORDER — FENTANYL CITRATE 50 UG/ML
INJECTION, SOLUTION INTRAMUSCULAR; INTRAVENOUS CODE/TRAUMA/SEDATION MEDICATION
Status: COMPLETED | OUTPATIENT
Start: 2017-06-13 | End: 2017-06-13

## 2017-06-13 RX ORDER — MIDAZOLAM HYDROCHLORIDE 1 MG/ML
INJECTION INTRAMUSCULAR; INTRAVENOUS CODE/TRAUMA/SEDATION MEDICATION
Status: COMPLETED | OUTPATIENT
Start: 2017-06-13 | End: 2017-06-13

## 2017-06-13 RX ORDER — ONDANSETRON 2 MG/ML
INJECTION INTRAMUSCULAR; INTRAVENOUS CODE/TRAUMA/SEDATION MEDICATION
Status: COMPLETED | OUTPATIENT
Start: 2017-06-13 | End: 2017-06-13

## 2017-06-13 RX ORDER — POTASSIUM CHLORIDE 600 MG/1
10 CAPSULE, EXTENDED RELEASE ORAL DAILY
COMMUNITY
End: 2018-01-31 | Stop reason: ALTCHOICE

## 2017-06-13 RX ADMIN — MIDAZOLAM HYDROCHLORIDE 1 MG: 1 INJECTION, SOLUTION INTRAMUSCULAR; INTRAVENOUS at 09:43

## 2017-06-13 RX ADMIN — ONDANSETRON 4 MG: 2 INJECTION INTRAMUSCULAR; INTRAVENOUS at 09:30

## 2017-06-13 RX ADMIN — LIDOCAINE HYDROCHLORIDE,EPINEPHRINE BITARTRATE 20 ML: 10; .01 INJECTION, SOLUTION INFILTRATION; PERINEURAL at 09:44

## 2017-06-13 RX ADMIN — FENTANYL CITRATE 50 MCG: 50 INJECTION, SOLUTION INTRAMUSCULAR; INTRAVENOUS at 09:43

## 2017-06-13 RX ADMIN — LIDOCAINE HYDROCHLORIDE,EPINEPHRINE BITARTRATE 10 ML: 10; .01 INJECTION, SOLUTION INFILTRATION; PERINEURAL at 09:57

## 2017-06-13 RX ADMIN — SODIUM CHLORIDE 50 ML/HR: 0.9 INJECTION, SOLUTION INTRAVENOUS at 07:35

## 2017-06-13 RX ADMIN — FENTANYL CITRATE 50 MCG: 50 INJECTION, SOLUTION INTRAMUSCULAR; INTRAVENOUS at 09:34

## 2017-06-13 RX ADMIN — FENTANYL CITRATE 50 MCG: 50 INJECTION, SOLUTION INTRAMUSCULAR; INTRAVENOUS at 09:51

## 2017-06-13 RX ADMIN — MIDAZOLAM HYDROCHLORIDE 1 MG: 1 INJECTION, SOLUTION INTRAMUSCULAR; INTRAVENOUS at 09:30

## 2017-06-13 RX ADMIN — HEPARIN SODIUM (PORCINE) LOCK FLUSH IV SOLN 100 UNIT/ML 400 UNITS: 100 SOLUTION at 10:16

## 2017-06-14 RX ORDER — SODIUM CHLORIDE 9 MG/ML
20 INJECTION, SOLUTION INTRAVENOUS CONTINUOUS
Status: DISCONTINUED | OUTPATIENT
Start: 2017-06-15 | End: 2017-06-18 | Stop reason: HOSPADM

## 2017-06-14 RX ORDER — PALONOSETRON 0.05 MG/ML
0.25 INJECTION, SOLUTION INTRAVENOUS ONCE
Status: COMPLETED | OUTPATIENT
Start: 2017-06-15 | End: 2017-06-15

## 2017-06-15 ENCOUNTER — HOSPITAL ENCOUNTER (OUTPATIENT)
Dept: INFUSION CENTER | Facility: HOSPITAL | Age: 72
Discharge: HOME | End: 2017-06-15
Payer: MEDICARE

## 2017-06-15 VITALS
WEIGHT: 190.48 LBS | HEART RATE: 62 BPM | DIASTOLIC BLOOD PRESSURE: 78 MMHG | RESPIRATION RATE: 18 BRPM | HEIGHT: 61 IN | BODY MASS INDEX: 35.96 KG/M2 | TEMPERATURE: 98.2 F | SYSTOLIC BLOOD PRESSURE: 142 MMHG

## 2017-06-15 PROCEDURE — 96413 CHEMO IV INFUSION 1 HR: CPT

## 2017-06-15 PROCEDURE — 96417 CHEMO IV INFUS EACH ADDL SEQ: CPT

## 2017-06-15 PROCEDURE — 96367 TX/PROPH/DG ADDL SEQ IV INF: CPT

## 2017-06-15 PROCEDURE — 96375 TX/PRO/DX INJ NEW DRUG ADDON: CPT

## 2017-06-15 RX ORDER — DOCUSATE SODIUM 100 MG/1
100 CAPSULE, LIQUID FILLED ORAL 2 TIMES DAILY
COMMUNITY
End: 2018-01-31 | Stop reason: ALTCHOICE

## 2017-06-15 RX ORDER — POLYETHYLENE GLYCOL 3350 17 G/17G
17 POWDER, FOR SOLUTION ORAL DAILY
COMMUNITY
End: 2018-04-07

## 2017-06-15 RX ADMIN — SODIUM CHLORIDE 20 ML/HR: 0.9 INJECTION, SOLUTION INTRAVENOUS at 09:45

## 2017-06-15 RX ADMIN — DEXAMETHASONE SODIUM PHOSPHATE 20 MG: 10 INJECTION, SOLUTION INTRAMUSCULAR; INTRAVENOUS at 09:51

## 2017-06-15 RX ADMIN — DIPHENHYDRAMINE HYDROCHLORIDE: 50 INJECTION, SOLUTION INTRAMUSCULAR; INTRAVENOUS at 10:20

## 2017-06-15 RX ADMIN — PALONOSETRON HYDROCHLORIDE 0.25 MG: 0.25 INJECTION INTRAVENOUS at 11:44

## 2017-06-15 RX ADMIN — Medication 300 UNITS: at 14:37

## 2017-06-15 RX ADMIN — CARBOPLATIN 574 MG: 10 INJECTION, SOLUTION INTRAVENOUS at 13:14

## 2017-06-15 RX ADMIN — PACLITAXEL 146 MG: 6 INJECTION, SOLUTION INTRAVENOUS at 11:53

## 2017-06-15 RX ADMIN — SODIUM CHLORIDE 150 MG: 0.9 INJECTION, SOLUTION INTRAVENOUS at 10:56

## 2017-06-15 NOTE — PLAN OF CARE
Problem: Potential for Falls  Goal: Patient will remain free of falls  INTERVENTIONS:  - Assess patient frequently for physical needs  -  Identify cognitive and physical deficits and behaviors that affect risk of falls    -  [place] fall precautions as indicated by assessment   - Educate patient/family on patient safety including physical limitations  - Instruct patient to call for assistance with activity based on assessment  - Modify environment to reduce risk of injury  - Consider OT/PT consult to assist with strengthening/mobility   Outcome: Progressing

## 2017-06-15 NOTE — PLAN OF CARE
Problem: Potential for Falls  Goal: Patient will remain free of falls  INTERVENTIONS:  - Assess patient frequently for physical needs  -  Identify cognitive and physical deficits and behaviors that affect risk of falls  -  [place] fall precautions as indicated by assessment   - Educate patient/family on patient safety including physical limitations  - Instruct patient to call for assistance with activity based on assessment  - Modify environment to reduce risk of injury  - Consider OT/PT consult to assist with strengthening/mobility   Outcome: Progressing      Problem: SAFETY ADULT  Goal: Patient will remain free of falls  INTERVENTIONS:  - Assess patient frequently for physical needs  -  Identify cognitive and physical deficits and behaviors that affect risk of falls  -  [place] fall precautions as indicated by assessment   - Educate patient/family on patient safety including physical limitations  - Instruct patient to call for assistance with activity based on assessment  - Modify environment to reduce risk of injury  - Consider OT/PT consult to assist with strengthening/mobility   Outcome: Progressing      Problem: Knowledge Deficit  Goal: Patient/family/caregiver demonstrates understanding of disease process, treatment plan, medications, and discharge instructions  Complete learning assessment and assess knowledge base    Interventions:  - Provide teaching at level of understanding  - Provide teaching via preferred learning methods  Outcome: Progressing

## 2017-06-15 NOTE — PROGRESS NOTES
Pt c/o being unable to sit still  No CP, SOB, flushing or pain   Upon further questioning pt has had a similar reaction to benadryl in the past

## 2017-06-21 ENCOUNTER — HOSPITAL ENCOUNTER (OUTPATIENT)
Dept: INFUSION CENTER | Facility: HOSPITAL | Age: 72
Discharge: HOME | End: 2017-06-21
Payer: MEDICARE

## 2017-06-21 VITALS
HEIGHT: 61 IN | SYSTOLIC BLOOD PRESSURE: 125 MMHG | HEART RATE: 65 BPM | DIASTOLIC BLOOD PRESSURE: 60 MMHG | BODY MASS INDEX: 35.13 KG/M2 | RESPIRATION RATE: 16 BRPM | WEIGHT: 186.07 LBS | TEMPERATURE: 97.7 F

## 2017-06-21 PROCEDURE — 96367 TX/PROPH/DG ADDL SEQ IV INF: CPT

## 2017-06-21 PROCEDURE — 96413 CHEMO IV INFUSION 1 HR: CPT

## 2017-06-21 RX ORDER — SODIUM CHLORIDE 9 MG/ML
20 INJECTION, SOLUTION INTRAVENOUS ONCE
Status: COMPLETED | OUTPATIENT
Start: 2017-06-21 | End: 2017-06-21

## 2017-06-21 RX ADMIN — SODIUM CHLORIDE 20 ML/HR: 0.9 INJECTION, SOLUTION INTRAVENOUS at 11:43

## 2017-06-21 RX ADMIN — DIPHENHYDRAMINE HYDROCHLORIDE: 50 INJECTION, SOLUTION INTRAMUSCULAR; INTRAVENOUS at 11:42

## 2017-06-21 RX ADMIN — PACLITAXEL 146 MG: 6 INJECTION, SOLUTION INTRAVENOUS at 12:27

## 2017-06-21 RX ADMIN — DEXAMETHASONE SODIUM PHOSPHATE 20 MG: 10 INJECTION, SOLUTION INTRAMUSCULAR; INTRAVENOUS at 12:03

## 2017-06-21 RX ADMIN — Medication 300 UNITS: at 13:42

## 2017-06-21 NOTE — PLAN OF CARE
Problem: Potential for Falls  Goal: Patient will remain free of falls  INTERVENTIONS:  - Assess patient frequently for physical needs  -  Identify cognitive and physical deficits and behaviors that affect risk of falls    -  [place] fall precautions as indicated by assessment   - Educate patient/family on patient safety including physical limitations  - Instruct patient to call for assistance with activity based on assessment  - Modify environment to reduce risk of injury  - Consider OT/PT consult to assist with strengthening/mobility   Outcome: Progressing

## 2017-06-22 ENCOUNTER — ALLSCRIPTS OFFICE VISIT (OUTPATIENT)
Dept: OTHER | Facility: OTHER | Age: 72
End: 2017-06-22

## 2017-06-28 RX ORDER — SODIUM CHLORIDE 9 MG/ML
20 INJECTION, SOLUTION INTRAVENOUS ONCE
Status: COMPLETED | OUTPATIENT
Start: 2017-06-29 | End: 2017-06-29

## 2017-06-29 ENCOUNTER — ALLSCRIPTS OFFICE VISIT (OUTPATIENT)
Dept: OTHER | Facility: OTHER | Age: 72
End: 2017-06-29

## 2017-06-29 ENCOUNTER — HOSPITAL ENCOUNTER (OUTPATIENT)
Dept: INFUSION CENTER | Facility: HOSPITAL | Age: 72
Discharge: HOME | End: 2017-06-29
Payer: MEDICARE

## 2017-06-29 VITALS
SYSTOLIC BLOOD PRESSURE: 142 MMHG | HEART RATE: 78 BPM | TEMPERATURE: 98.7 F | RESPIRATION RATE: 18 BRPM | WEIGHT: 187.39 LBS | HEIGHT: 61 IN | BODY MASS INDEX: 35.38 KG/M2 | DIASTOLIC BLOOD PRESSURE: 78 MMHG

## 2017-06-29 PROCEDURE — 96367 TX/PROPH/DG ADDL SEQ IV INF: CPT

## 2017-06-29 PROCEDURE — 96413 CHEMO IV INFUSION 1 HR: CPT

## 2017-06-29 RX ADMIN — DIPHENHYDRAMINE HYDROCHLORIDE: 50 INJECTION, SOLUTION INTRAMUSCULAR; INTRAVENOUS at 12:49

## 2017-06-29 RX ADMIN — PACLITAXEL 146 MG: 6 INJECTION, SOLUTION, CONCENTRATE INTRAVENOUS at 13:48

## 2017-06-29 RX ADMIN — DEXAMETHASONE SODIUM PHOSPHATE 20 MG: 10 INJECTION, SOLUTION INTRAMUSCULAR; INTRAVENOUS at 13:09

## 2017-06-29 RX ADMIN — Medication 300 UNITS: at 14:52

## 2017-06-29 RX ADMIN — SODIUM CHLORIDE 20 ML/HR: 0.9 INJECTION, SOLUTION INTRAVENOUS at 12:48

## 2017-06-29 NOTE — PLAN OF CARE
Problem: Potential for Falls  Goal: Patient will remain free of falls  INTERVENTIONS:  - Assess patient frequently for physical needs  -  Identify cognitive and physical deficits and behaviors that affect risk of falls    -  [place] fall precautions as indicated by assessment   - Educate patient/family on patient safety including physical limitations  - Instruct patient to call for assistance with activity based on assessment  - Modify environment to reduce risk of injury  - Consider OT/PT consult to assist with strengthening/mobility   Outcome: Progressing

## 2017-07-07 ENCOUNTER — HOSPITAL ENCOUNTER (OUTPATIENT)
Dept: INFUSION CENTER | Facility: HOSPITAL | Age: 72
Discharge: HOME | End: 2017-07-07
Payer: MEDICARE

## 2017-07-07 VITALS
RESPIRATION RATE: 18 BRPM | SYSTOLIC BLOOD PRESSURE: 138 MMHG | TEMPERATURE: 98.2 F | WEIGHT: 190.26 LBS | DIASTOLIC BLOOD PRESSURE: 62 MMHG | HEART RATE: 65 BPM | HEIGHT: 61 IN | BODY MASS INDEX: 35.92 KG/M2

## 2017-07-07 PROCEDURE — 96413 CHEMO IV INFUSION 1 HR: CPT

## 2017-07-07 PROCEDURE — 96417 CHEMO IV INFUS EACH ADDL SEQ: CPT

## 2017-07-07 PROCEDURE — 96375 TX/PRO/DX INJ NEW DRUG ADDON: CPT

## 2017-07-07 PROCEDURE — 96367 TX/PROPH/DG ADDL SEQ IV INF: CPT

## 2017-07-07 RX ORDER — SODIUM CHLORIDE 9 MG/ML
20 INJECTION, SOLUTION INTRAVENOUS ONCE
Status: COMPLETED | OUTPATIENT
Start: 2017-07-07 | End: 2017-07-07

## 2017-07-07 RX ORDER — PALONOSETRON 0.05 MG/ML
0.25 INJECTION, SOLUTION INTRAVENOUS ONCE
Status: COMPLETED | OUTPATIENT
Start: 2017-07-07 | End: 2017-07-07

## 2017-07-07 RX ADMIN — PACLITAXEL 146 MG: 6 INJECTION, SOLUTION, CONCENTRATE INTRAVENOUS at 12:48

## 2017-07-07 RX ADMIN — Medication 300 UNITS: at 15:20

## 2017-07-07 RX ADMIN — SODIUM CHLORIDE 20 ML/HR: 0.9 INJECTION, SOLUTION INTRAVENOUS at 10:51

## 2017-07-07 RX ADMIN — SODIUM CHLORIDE 150 MG: 0.9 INJECTION, SOLUTION INTRAVENOUS at 11:55

## 2017-07-07 RX ADMIN — PALONOSETRON HYDROCHLORIDE 0.25 MG: 0.25 INJECTION INTRAVENOUS at 10:51

## 2017-07-07 RX ADMIN — DIPHENHYDRAMINE HYDROCHLORIDE: 50 INJECTION, SOLUTION INTRAMUSCULAR; INTRAVENOUS at 11:22

## 2017-07-07 RX ADMIN — CARBOPLATIN 586 MG: 10 INJECTION, SOLUTION INTRAVENOUS at 14:09

## 2017-07-07 RX ADMIN — DEXAMETHASONE SODIUM PHOSPHATE 20 MG: 10 INJECTION, SOLUTION INTRAMUSCULAR; INTRAVENOUS at 10:51

## 2017-07-13 RX ORDER — SODIUM CHLORIDE 9 MG/ML
20 INJECTION, SOLUTION INTRAVENOUS CONTINUOUS
Status: DISCONTINUED | OUTPATIENT
Start: 2017-07-14 | End: 2017-07-17 | Stop reason: HOSPADM

## 2017-07-14 ENCOUNTER — HOSPITAL ENCOUNTER (OUTPATIENT)
Dept: INFUSION CENTER | Facility: CLINIC | Age: 72
Discharge: HOME | End: 2017-07-14
Payer: MEDICARE

## 2017-07-14 VITALS
TEMPERATURE: 98.2 F | WEIGHT: 190 LBS | DIASTOLIC BLOOD PRESSURE: 84 MMHG | BODY MASS INDEX: 35.87 KG/M2 | SYSTOLIC BLOOD PRESSURE: 158 MMHG | HEIGHT: 61 IN | HEART RATE: 88 BPM | RESPIRATION RATE: 18 BRPM

## 2017-07-14 PROCEDURE — 96367 TX/PROPH/DG ADDL SEQ IV INF: CPT

## 2017-07-14 PROCEDURE — 96413 CHEMO IV INFUSION 1 HR: CPT

## 2017-07-14 RX ADMIN — SODIUM CHLORIDE 20 ML/HR: 0.9 INJECTION, SOLUTION INTRAVENOUS at 08:30

## 2017-07-14 RX ADMIN — DEXAMETHASONE SODIUM PHOSPHATE 20 MG: 10 INJECTION, SOLUTION INTRAMUSCULAR; INTRAVENOUS at 08:41

## 2017-07-14 RX ADMIN — DIPHENHYDRAMINE HYDROCHLORIDE 25 MG: 50 INJECTION, SOLUTION INTRAMUSCULAR; INTRAVENOUS at 09:02

## 2017-07-14 RX ADMIN — PACLITAXEL 146 MG: 6 INJECTION, SOLUTION, CONCENTRATE INTRAVENOUS at 09:50

## 2017-07-14 RX ADMIN — FAMOTIDINE 20 MG: 10 INJECTION, SOLUTION INTRAVENOUS at 09:30

## 2017-07-17 DIAGNOSIS — C57.00 MALIGNANT NEOPLASM OF FALLOPIAN TUBE (HCC): ICD-10-CM

## 2017-07-17 DIAGNOSIS — R10.9 ABDOMINAL PAIN: ICD-10-CM

## 2017-07-18 ENCOUNTER — TRANSCRIBE ORDERS (OUTPATIENT)
Dept: ADMINISTRATIVE | Facility: HOSPITAL | Age: 72
End: 2017-07-18

## 2017-07-18 ENCOUNTER — HOSPITAL ENCOUNTER (OUTPATIENT)
Dept: CT IMAGING | Facility: HOSPITAL | Age: 72
Discharge: HOME | End: 2017-07-18
Payer: MEDICARE

## 2017-07-18 DIAGNOSIS — C57.00 MALIGNANT NEOPLASM OF FALLOPIAN TUBE (HCC): ICD-10-CM

## 2017-07-18 DIAGNOSIS — R10.9 ABDOMINAL PAIN: ICD-10-CM

## 2017-07-18 PROCEDURE — 74177 CT ABD & PELVIS W/CONTRAST: CPT

## 2017-07-18 RX ADMIN — IOHEXOL 100 ML: 350 INJECTION, SOLUTION INTRAVENOUS at 10:32

## 2017-07-19 RX ORDER — SODIUM CHLORIDE 9 MG/ML
20 INJECTION, SOLUTION INTRAVENOUS CONTINUOUS
Status: DISCONTINUED | OUTPATIENT
Start: 2017-07-20 | End: 2017-07-23 | Stop reason: HOSPADM

## 2017-07-20 ENCOUNTER — ALLSCRIPTS OFFICE VISIT (OUTPATIENT)
Dept: OTHER | Facility: OTHER | Age: 72
End: 2017-07-20

## 2017-07-20 ENCOUNTER — HOSPITAL ENCOUNTER (OUTPATIENT)
Dept: INFUSION CENTER | Facility: CLINIC | Age: 72
Discharge: HOME | End: 2017-07-20
Payer: MEDICARE

## 2017-07-20 VITALS
WEIGHT: 193 LBS | HEIGHT: 60 IN | DIASTOLIC BLOOD PRESSURE: 58 MMHG | SYSTOLIC BLOOD PRESSURE: 124 MMHG | OXYGEN SATURATION: 96 % | RESPIRATION RATE: 18 BRPM | HEART RATE: 65 BPM | TEMPERATURE: 98.3 F | BODY MASS INDEX: 37.89 KG/M2

## 2017-07-20 PROCEDURE — 96367 TX/PROPH/DG ADDL SEQ IV INF: CPT

## 2017-07-20 PROCEDURE — 96413 CHEMO IV INFUSION 1 HR: CPT

## 2017-07-20 RX ADMIN — FAMOTIDINE 20 MG: 10 INJECTION, SOLUTION INTRAVENOUS at 13:33

## 2017-07-20 RX ADMIN — DIPHENHYDRAMINE HYDROCHLORIDE 25 MG: 50 INJECTION, SOLUTION INTRAMUSCULAR; INTRAVENOUS at 13:09

## 2017-07-20 RX ADMIN — PACLITAXEL 146 MG: 6 INJECTION, SOLUTION, CONCENTRATE INTRAVENOUS at 14:06

## 2017-07-20 RX ADMIN — DEXAMETHASONE SODIUM PHOSPHATE 20 MG: 10 INJECTION, SOLUTION INTRAMUSCULAR; INTRAVENOUS at 12:50

## 2017-07-20 RX ADMIN — SODIUM CHLORIDE 20 ML/HR: 0.9 INJECTION, SOLUTION INTRAVENOUS at 12:15

## 2017-07-20 RX ADMIN — Medication 300 UNITS: at 15:15

## 2017-07-26 RX ORDER — SODIUM CHLORIDE 9 MG/ML
20 INJECTION, SOLUTION INTRAVENOUS CONTINUOUS
Status: DISCONTINUED | OUTPATIENT
Start: 2017-07-27 | End: 2017-07-30 | Stop reason: HOSPADM

## 2017-07-26 RX ORDER — MAGNESIUM SULFATE HEPTAHYDRATE 40 MG/ML
2 INJECTION, SOLUTION INTRAVENOUS ONCE
Status: COMPLETED | OUTPATIENT
Start: 2017-07-27 | End: 2017-07-27

## 2017-07-26 RX ORDER — PALONOSETRON 0.05 MG/ML
0.25 INJECTION, SOLUTION INTRAVENOUS ONCE
Status: COMPLETED | OUTPATIENT
Start: 2017-07-27 | End: 2017-07-27

## 2017-07-27 ENCOUNTER — HOSPITAL ENCOUNTER (OUTPATIENT)
Dept: INFUSION CENTER | Facility: CLINIC | Age: 72
Discharge: HOME | End: 2017-07-27
Payer: MEDICARE

## 2017-07-27 VITALS
SYSTOLIC BLOOD PRESSURE: 152 MMHG | BODY MASS INDEX: 37.69 KG/M2 | TEMPERATURE: 98.1 F | RESPIRATION RATE: 20 BRPM | HEIGHT: 60 IN | WEIGHT: 192 LBS | HEART RATE: 68 BPM | DIASTOLIC BLOOD PRESSURE: 80 MMHG

## 2017-07-27 PROCEDURE — 96375 TX/PRO/DX INJ NEW DRUG ADDON: CPT

## 2017-07-27 PROCEDURE — 96413 CHEMO IV INFUSION 1 HR: CPT

## 2017-07-27 PROCEDURE — 96367 TX/PROPH/DG ADDL SEQ IV INF: CPT

## 2017-07-27 PROCEDURE — 96368 THER/DIAG CONCURRENT INF: CPT

## 2017-07-27 PROCEDURE — 96417 CHEMO IV INFUS EACH ADDL SEQ: CPT

## 2017-07-27 RX ADMIN — DIPHENHYDRAMINE HYDROCHLORIDE 25 MG: 50 INJECTION, SOLUTION INTRAMUSCULAR; INTRAVENOUS at 09:48

## 2017-07-27 RX ADMIN — FAMOTIDINE 20 MG: 10 INJECTION, SOLUTION INTRAVENOUS at 10:03

## 2017-07-27 RX ADMIN — DEXAMETHASONE SODIUM PHOSPHATE 20 MG: 10 INJECTION, SOLUTION INTRAMUSCULAR; INTRAVENOUS at 09:31

## 2017-07-27 RX ADMIN — MAGNESIUM SULFATE HEPTAHYDRATE 2 G: 40 INJECTION, SOLUTION INTRAVENOUS at 11:07

## 2017-07-27 RX ADMIN — SODIUM CHLORIDE 20 ML/HR: 0.9 INJECTION, SOLUTION INTRAVENOUS at 09:20

## 2017-07-27 RX ADMIN — SODIUM CHLORIDE 150 MG: 0.9 INJECTION, SOLUTION INTRAVENOUS at 10:19

## 2017-07-27 RX ADMIN — PALONOSETRON HYDROCHLORIDE 0.25 MG: 0.25 INJECTION INTRAVENOUS at 09:45

## 2017-07-27 RX ADMIN — PACLITAXEL 146 MG: 6 INJECTION, SOLUTION, CONCENTRATE INTRAVENOUS at 11:01

## 2017-07-27 RX ADMIN — CARBOPLATIN 586 MG: 10 INJECTION, SOLUTION INTRAVENOUS at 12:09

## 2017-07-27 RX ADMIN — Medication 300 UNITS: at 13:10

## 2017-08-01 RX ORDER — SODIUM CHLORIDE 9 MG/ML
20 INJECTION, SOLUTION INTRAVENOUS CONTINUOUS
Status: DISCONTINUED | OUTPATIENT
Start: 2017-08-02 | End: 2017-08-05 | Stop reason: HOSPADM

## 2017-08-02 ENCOUNTER — HOSPITAL ENCOUNTER (OUTPATIENT)
Dept: INFUSION CENTER | Facility: CLINIC | Age: 72
Discharge: HOME | End: 2017-08-02
Payer: MEDICARE

## 2017-08-02 VITALS
BODY MASS INDEX: 36.13 KG/M2 | SYSTOLIC BLOOD PRESSURE: 142 MMHG | TEMPERATURE: 98.2 F | WEIGHT: 191.36 LBS | RESPIRATION RATE: 18 BRPM | HEART RATE: 68 BPM | HEIGHT: 61 IN | DIASTOLIC BLOOD PRESSURE: 72 MMHG

## 2017-08-02 PROCEDURE — 96367 TX/PROPH/DG ADDL SEQ IV INF: CPT

## 2017-08-02 PROCEDURE — 96413 CHEMO IV INFUSION 1 HR: CPT

## 2017-08-02 RX ORDER — ACETAMINOPHEN 325 MG/1
975 TABLET ORAL ONCE
Status: COMPLETED | OUTPATIENT
Start: 2017-08-02 | End: 2017-08-02

## 2017-08-02 RX ADMIN — Medication 300 UNITS: at 11:01

## 2017-08-02 RX ADMIN — PACLITAXEL 146 MG: 6 INJECTION, SOLUTION, CONCENTRATE INTRAVENOUS at 09:52

## 2017-08-02 RX ADMIN — DEXAMETHASONE SODIUM PHOSPHATE 20 MG: 10 INJECTION, SOLUTION INTRAMUSCULAR; INTRAVENOUS at 08:55

## 2017-08-02 RX ADMIN — ACETAMINOPHEN 975 MG: 325 TABLET ORAL at 08:36

## 2017-08-02 RX ADMIN — DIPHENHYDRAMINE HYDROCHLORIDE 25 MG: 50 INJECTION, SOLUTION INTRAMUSCULAR; INTRAVENOUS at 08:36

## 2017-08-02 RX ADMIN — SODIUM CHLORIDE 20 ML/HR: 0.9 INJECTION, SOLUTION INTRAVENOUS at 08:24

## 2017-08-02 RX ADMIN — FAMOTIDINE 20 MG: 10 INJECTION, SOLUTION INTRAVENOUS at 09:17

## 2017-08-02 NOTE — PLAN OF CARE
Problem: Potential for Falls  Goal: Patient will remain free of falls  INTERVENTIONS:  - Assess patient frequently for physical needs  -  Identify cognitive and physical deficits and behaviors that affect risk of falls    -  [place] fall precautions as indicated by assessment   - Educate patient/family on patient safety including physical limitations  - Instruct patient to call for assistance with activity based on assessment  - Modify environment to reduce risk of injury  - Consider OT/PT consult to assist with strengthening/mobility   Outcome: Progressing

## 2017-08-02 NOTE — PROGRESS NOTES
Patient tolerated treatment well without any complications and confirmed next appt next week in [facility]

## 2017-08-08 ENCOUNTER — GENERIC CONVERSION - ENCOUNTER (OUTPATIENT)
Dept: OTHER | Facility: OTHER | Age: 72
End: 2017-08-08

## 2017-08-09 ENCOUNTER — ALLSCRIPTS OFFICE VISIT (OUTPATIENT)
Dept: OTHER | Facility: OTHER | Age: 72
End: 2017-08-09

## 2017-08-09 ENCOUNTER — HOSPITAL ENCOUNTER (OUTPATIENT)
Dept: INFUSION CENTER | Facility: HOSPITAL | Age: 72
Discharge: HOME | End: 2017-08-09
Payer: MEDICARE

## 2017-08-09 VITALS
WEIGHT: 194.45 LBS | DIASTOLIC BLOOD PRESSURE: 63 MMHG | HEIGHT: 61 IN | HEART RATE: 64 BPM | RESPIRATION RATE: 18 BRPM | BODY MASS INDEX: 36.71 KG/M2 | SYSTOLIC BLOOD PRESSURE: 158 MMHG | TEMPERATURE: 96.9 F

## 2017-08-09 PROCEDURE — 96367 TX/PROPH/DG ADDL SEQ IV INF: CPT

## 2017-08-09 PROCEDURE — 96413 CHEMO IV INFUSION 1 HR: CPT

## 2017-08-09 RX ORDER — SODIUM CHLORIDE 9 MG/ML
20 INJECTION, SOLUTION INTRAVENOUS CONTINUOUS
Status: DISCONTINUED | OUTPATIENT
Start: 2017-08-09 | End: 2017-08-12 | Stop reason: HOSPADM

## 2017-08-09 RX ADMIN — PACLITAXEL 146 MG: 6 INJECTION, SOLUTION, CONCENTRATE INTRAVENOUS at 12:18

## 2017-08-09 RX ADMIN — DEXAMETHASONE SODIUM PHOSPHATE 20 MG: 10 INJECTION, SOLUTION INTRAMUSCULAR; INTRAVENOUS at 11:44

## 2017-08-09 RX ADMIN — DIPHENHYDRAMINE HYDROCHLORIDE: 50 INJECTION, SOLUTION INTRAMUSCULAR; INTRAVENOUS at 11:20

## 2017-08-09 RX ADMIN — Medication 300 UNITS: at 13:31

## 2017-08-09 RX ADMIN — SODIUM CHLORIDE 20 ML/HR: 0.9 INJECTION, SOLUTION INTRAVENOUS at 11:20

## 2017-08-16 RX ORDER — PALONOSETRON 0.05 MG/ML
0.25 INJECTION, SOLUTION INTRAVENOUS ONCE
Status: COMPLETED | OUTPATIENT
Start: 2017-08-17 | End: 2017-08-17

## 2017-08-16 RX ORDER — SODIUM CHLORIDE 9 MG/ML
20 INJECTION, SOLUTION INTRAVENOUS CONTINUOUS
Status: DISCONTINUED | OUTPATIENT
Start: 2017-08-17 | End: 2017-08-20 | Stop reason: HOSPADM

## 2017-08-17 ENCOUNTER — HOSPITAL ENCOUNTER (OUTPATIENT)
Dept: INFUSION CENTER | Facility: CLINIC | Age: 72
Discharge: HOME | End: 2017-08-17
Payer: MEDICARE

## 2017-08-17 VITALS
BODY MASS INDEX: 38.09 KG/M2 | TEMPERATURE: 98.2 F | HEIGHT: 60 IN | RESPIRATION RATE: 18 BRPM | WEIGHT: 194 LBS | DIASTOLIC BLOOD PRESSURE: 64 MMHG | HEART RATE: 74 BPM | SYSTOLIC BLOOD PRESSURE: 122 MMHG

## 2017-08-17 PROCEDURE — 96375 TX/PRO/DX INJ NEW DRUG ADDON: CPT

## 2017-08-17 PROCEDURE — 96417 CHEMO IV INFUS EACH ADDL SEQ: CPT

## 2017-08-17 PROCEDURE — 96413 CHEMO IV INFUSION 1 HR: CPT

## 2017-08-17 PROCEDURE — 96367 TX/PROPH/DG ADDL SEQ IV INF: CPT

## 2017-08-17 RX ADMIN — SODIUM CHLORIDE 150 MG: 0.9 INJECTION, SOLUTION INTRAVENOUS at 10:35

## 2017-08-17 RX ADMIN — CARBOPLATIN 605 MG: 10 INJECTION, SOLUTION INTRAVENOUS at 12:25

## 2017-08-17 RX ADMIN — DIPHENHYDRAMINE HYDROCHLORIDE 25 MG: 50 INJECTION, SOLUTION INTRAMUSCULAR; INTRAVENOUS at 10:13

## 2017-08-17 RX ADMIN — DEXAMETHASONE SODIUM PHOSPHATE 20 MG: 10 INJECTION, SOLUTION INTRAMUSCULAR; INTRAVENOUS at 09:31

## 2017-08-17 RX ADMIN — SODIUM CHLORIDE 20 ML/HR: 0.9 INJECTION, SOLUTION INTRAVENOUS at 09:10

## 2017-08-17 RX ADMIN — PALONOSETRON HYDROCHLORIDE 0.25 MG: 0.25 INJECTION INTRAVENOUS at 09:31

## 2017-08-17 RX ADMIN — PACLITAXEL 148 MG: 6 INJECTION, SOLUTION, CONCENTRATE INTRAVENOUS at 11:23

## 2017-08-17 RX ADMIN — FAMOTIDINE 20 MG: 10 INJECTION, SOLUTION INTRAVENOUS at 09:52

## 2017-08-17 RX ADMIN — Medication 300 UNITS: at 13:36

## 2017-08-17 NOTE — PLAN OF CARE
Problem: Potential for Falls  Goal: Patient will remain free of falls  INTERVENTIONS:  - Assess patient frequently for physical needs  -  Identify cognitive and physical deficits and behaviors that affect risk of falls    -  [place] fall precautions as indicated by assessment   - Educate patient/family on patient safety including physical limitations  - Instruct patient to call for assistance with activity based on assessment  - Modify environment to reduce risk of injury  - Consider OT/PT consult to assist with strengthening/mobility   Outcome: Progressing

## 2017-08-24 RX ORDER — SODIUM CHLORIDE 9 MG/ML
20 INJECTION, SOLUTION INTRAVENOUS CONTINUOUS
Status: DISCONTINUED | OUTPATIENT
Start: 2017-08-25 | End: 2017-08-28 | Stop reason: HOSPADM

## 2017-08-25 ENCOUNTER — HOSPITAL ENCOUNTER (OUTPATIENT)
Dept: INFUSION CENTER | Facility: CLINIC | Age: 72
Discharge: HOME | End: 2017-08-25
Payer: MEDICARE

## 2017-08-25 VITALS
DIASTOLIC BLOOD PRESSURE: 66 MMHG | OXYGEN SATURATION: 97 % | SYSTOLIC BLOOD PRESSURE: 110 MMHG | TEMPERATURE: 98.3 F | WEIGHT: 195 LBS | BODY MASS INDEX: 37.77 KG/M2 | HEART RATE: 60 BPM | RESPIRATION RATE: 20 BRPM

## 2017-08-25 PROCEDURE — 96367 TX/PROPH/DG ADDL SEQ IV INF: CPT

## 2017-08-25 PROCEDURE — 96413 CHEMO IV INFUSION 1 HR: CPT

## 2017-08-25 RX ADMIN — Medication 300 UNITS: at 15:27

## 2017-08-25 RX ADMIN — FAMOTIDINE 20 MG: 10 INJECTION, SOLUTION INTRAVENOUS at 13:52

## 2017-08-25 RX ADMIN — SODIUM CHLORIDE 20 ML/HR: 0.9 INJECTION, SOLUTION INTRAVENOUS at 12:45

## 2017-08-25 RX ADMIN — DEXAMETHASONE SODIUM PHOSPHATE 20 MG: 10 INJECTION, SOLUTION INTRAMUSCULAR; INTRAVENOUS at 13:10

## 2017-08-25 RX ADMIN — DIPHENHYDRAMINE HYDROCHLORIDE 25 MG: 50 INJECTION, SOLUTION INTRAMUSCULAR; INTRAVENOUS at 13:30

## 2017-08-25 RX ADMIN — PACLITAXEL 148 MG: 6 INJECTION, SOLUTION, CONCENTRATE INTRAVENOUS at 14:23

## 2017-08-25 NOTE — PROGRESS NOTES
Pt tolerated infusion without issue  Pt offers no complaints at this time  Port de accessed after infusion complete dsd applied  Pt has follow up appt   Pt declined AVS

## 2017-08-25 NOTE — PROGRESS NOTES
Pt to infusion center for taxol  Pt only c/o sciatica pain  Pt offers no other complaints  Port accessed without issue +blood return flushes without issue

## 2017-08-30 RX ORDER — SODIUM CHLORIDE 9 MG/ML
20 INJECTION, SOLUTION INTRAVENOUS CONTINUOUS
Status: DISCONTINUED | OUTPATIENT
Start: 2017-08-31 | End: 2017-09-03 | Stop reason: HOSPADM

## 2017-08-31 ENCOUNTER — HOSPITAL ENCOUNTER (OUTPATIENT)
Dept: INFUSION CENTER | Facility: CLINIC | Age: 72
Discharge: HOME | End: 2017-08-31
Payer: MEDICARE

## 2017-08-31 ENCOUNTER — ALLSCRIPTS OFFICE VISIT (OUTPATIENT)
Dept: OTHER | Facility: OTHER | Age: 72
End: 2017-08-31

## 2017-08-31 VITALS
DIASTOLIC BLOOD PRESSURE: 65 MMHG | SYSTOLIC BLOOD PRESSURE: 139 MMHG | BODY MASS INDEX: 36.72 KG/M2 | WEIGHT: 194.5 LBS | RESPIRATION RATE: 18 BRPM | HEART RATE: 58 BPM | HEIGHT: 61 IN | TEMPERATURE: 96.7 F

## 2017-08-31 LAB
BILIRUB UR QL STRIP: NEGATIVE
CLARITY UR: CLEAR
COLOR UR: YELLOW
GLUCOSE UR STRIP-MCNC: NEGATIVE MG/DL
HGB UR QL STRIP.AUTO: NEGATIVE
KETONES UR STRIP-MCNC: NEGATIVE MG/DL
LEUKOCYTE ESTERASE UR QL STRIP: NEGATIVE
NITRITE UR QL STRIP: NEGATIVE
PH UR STRIP.AUTO: 5.5 [PH] (ref 4.5–8)
PROT UR STRIP-MCNC: NEGATIVE MG/DL
SP GR UR STRIP.AUTO: <=1.005 (ref 1–1.03)
UROBILINOGEN UR QL STRIP.AUTO: 0.2 E.U./DL

## 2017-08-31 PROCEDURE — 96413 CHEMO IV INFUSION 1 HR: CPT

## 2017-08-31 PROCEDURE — 81003 URINALYSIS AUTO W/O SCOPE: CPT | Performed by: OBSTETRICS & GYNECOLOGY

## 2017-08-31 PROCEDURE — 96367 TX/PROPH/DG ADDL SEQ IV INF: CPT

## 2017-08-31 RX ADMIN — Medication 300 UNITS: at 13:39

## 2017-08-31 RX ADMIN — PACLITAXEL 120 MG: 6 INJECTION, SOLUTION, CONCENTRATE INTRAVENOUS at 12:31

## 2017-08-31 RX ADMIN — SODIUM CHLORIDE 20 ML/HR: 0.9 INJECTION, SOLUTION INTRAVENOUS at 11:10

## 2017-08-31 RX ADMIN — DEXAMETHASONE SODIUM PHOSPHATE 20 MG: 10 INJECTION, SOLUTION INTRAMUSCULAR; INTRAVENOUS at 11:20

## 2017-08-31 RX ADMIN — DIPHENHYDRAMINE HYDROCHLORIDE 25 MG: 50 INJECTION, SOLUTION INTRAMUSCULAR; INTRAVENOUS at 11:45

## 2017-08-31 RX ADMIN — FAMOTIDINE 20 MG: 10 INJECTION, SOLUTION INTRAVENOUS at 12:10

## 2017-08-31 NOTE — PLAN OF CARE
Problem: Potential for Falls  Goal: Patient will remain free of falls  INTERVENTIONS:  - Assess patient frequently for physical needs  -  Identify cognitive and physical deficits and behaviors that affect risk of falls  -  [place] fall precautions as indicated by assessment   - Educate patient/family on patient safety including physical limitations  - Instruct patient to call for assistance with activity based on assessment  - Modify environment to reduce risk of injury  - Consider OT/PT consult to assist with strengthening/mobility   Outcome: Progressing      Problem: SAFETY ADULT  Goal: Patient will remain free of falls  INTERVENTIONS:  - Assess patient frequently for physical needs  -  Identify cognitive and physical deficits and behaviors that affect risk of falls  -  [place] fall precautions as indicated by assessment   - Educate patient/family on patient safety including physical limitations  - Instruct patient to call for assistance with activity based on assessment  - Modify environment to reduce risk of injury  - Consider OT/PT consult to assist with strengthening/mobility   Outcome: Progressing      Problem: Knowledge Deficit  Goal: Patient/family/caregiver demonstrates understanding of disease process, treatment plan, medications, and discharge instructions  Complete learning assessment and assess knowledge base    Interventions:  - Provide teaching at level of understanding  - Provide teaching via preferred learning methods  Outcome: Progressing

## 2017-09-07 ENCOUNTER — TRANSCRIBE ORDERS (OUTPATIENT)
Dept: ADMINISTRATIVE | Facility: HOSPITAL | Age: 72
End: 2017-09-07

## 2017-09-07 DIAGNOSIS — M54.16 LUMBAR RADICULOPATHY: ICD-10-CM

## 2017-09-07 DIAGNOSIS — M54.50 ACUTE MIDLINE LOW BACK PAIN WITHOUT SCIATICA: Primary | ICD-10-CM

## 2017-09-07 RX ORDER — PALONOSETRON 0.05 MG/ML
0.25 INJECTION, SOLUTION INTRAVENOUS ONCE
Status: COMPLETED | OUTPATIENT
Start: 2017-09-08 | End: 2017-09-08

## 2017-09-07 RX ORDER — MAGNESIUM SULFATE HEPTAHYDRATE 40 MG/ML
2 INJECTION, SOLUTION INTRAVENOUS ONCE
Status: COMPLETED | OUTPATIENT
Start: 2017-09-08 | End: 2017-09-08

## 2017-09-07 RX ORDER — SODIUM CHLORIDE 9 MG/ML
20 INJECTION, SOLUTION INTRAVENOUS CONTINUOUS
Status: DISCONTINUED | OUTPATIENT
Start: 2017-09-08 | End: 2017-09-11 | Stop reason: HOSPADM

## 2017-09-08 ENCOUNTER — HOSPITAL ENCOUNTER (OUTPATIENT)
Dept: INFUSION CENTER | Facility: CLINIC | Age: 72
Discharge: HOME | End: 2017-09-08
Payer: MEDICARE

## 2017-09-08 VITALS
WEIGHT: 197 LBS | RESPIRATION RATE: 20 BRPM | HEART RATE: 80 BPM | TEMPERATURE: 97.8 F | SYSTOLIC BLOOD PRESSURE: 132 MMHG | DIASTOLIC BLOOD PRESSURE: 76 MMHG | HEIGHT: 61 IN | OXYGEN SATURATION: 95 % | BODY MASS INDEX: 37.19 KG/M2

## 2017-09-08 PROCEDURE — 96367 TX/PROPH/DG ADDL SEQ IV INF: CPT

## 2017-09-08 PROCEDURE — 96375 TX/PRO/DX INJ NEW DRUG ADDON: CPT

## 2017-09-08 PROCEDURE — 96368 THER/DIAG CONCURRENT INF: CPT

## 2017-09-08 PROCEDURE — 96417 CHEMO IV INFUS EACH ADDL SEQ: CPT

## 2017-09-08 PROCEDURE — 96413 CHEMO IV INFUSION 1 HR: CPT

## 2017-09-08 RX ADMIN — DIPHENHYDRAMINE HYDROCHLORIDE 25 MG: 50 INJECTION, SOLUTION INTRAMUSCULAR; INTRAVENOUS at 10:37

## 2017-09-08 RX ADMIN — CARBOPLATIN 605 MG: 10 INJECTION, SOLUTION INTRAVENOUS at 13:43

## 2017-09-08 RX ADMIN — PACLITAXEL 120 MG: 6 INJECTION, SOLUTION, CONCENTRATE INTRAVENOUS at 12:25

## 2017-09-08 RX ADMIN — Medication 300 UNITS: at 14:52

## 2017-09-08 RX ADMIN — FAMOTIDINE 20 MG: 10 INJECTION, SOLUTION INTRAVENOUS at 10:16

## 2017-09-08 RX ADMIN — DEXAMETHASONE SODIUM PHOSPHATE 20 MG: 10 INJECTION, SOLUTION INTRAMUSCULAR; INTRAVENOUS at 10:58

## 2017-09-08 RX ADMIN — SODIUM CHLORIDE 150 MG: 0.9 INJECTION, SOLUTION INTRAVENOUS at 11:36

## 2017-09-08 RX ADMIN — MAGNESIUM SULFATE HEPTAHYDRATE 2 G: 40 INJECTION, SOLUTION INTRAVENOUS at 12:31

## 2017-09-08 RX ADMIN — SODIUM CHLORIDE 20 ML/HR: 0.9 INJECTION, SOLUTION INTRAVENOUS at 09:58

## 2017-09-08 RX ADMIN — PALONOSETRON HYDROCHLORIDE 0.25 MG: 0.25 INJECTION INTRAVENOUS at 12:22

## 2017-09-10 ENCOUNTER — HOSPITAL ENCOUNTER (OUTPATIENT)
Dept: MRI IMAGING | Facility: HOSPITAL | Age: 72
Discharge: HOME | End: 2017-09-10
Payer: MEDICARE

## 2017-09-10 DIAGNOSIS — M54.16 LUMBAR RADICULOPATHY: ICD-10-CM

## 2017-09-10 PROCEDURE — 72148 MRI LUMBAR SPINE W/O DYE: CPT

## 2017-09-14 ENCOUNTER — TRANSCRIBE ORDERS (OUTPATIENT)
Dept: ADMINISTRATIVE | Facility: HOSPITAL | Age: 72
End: 2017-09-14

## 2017-09-14 ENCOUNTER — GENERIC CONVERSION - ENCOUNTER (OUTPATIENT)
Dept: OTHER | Facility: OTHER | Age: 72
End: 2017-09-14

## 2017-09-14 ENCOUNTER — HOSPITAL ENCOUNTER (OUTPATIENT)
Dept: INFUSION CENTER | Facility: CLINIC | Age: 72
Discharge: HOME | End: 2017-09-14
Payer: MEDICARE

## 2017-09-14 VITALS
WEIGHT: 183 LBS | SYSTOLIC BLOOD PRESSURE: 122 MMHG | HEIGHT: 61 IN | DIASTOLIC BLOOD PRESSURE: 62 MMHG | RESPIRATION RATE: 18 BRPM | HEART RATE: 68 BPM | BODY MASS INDEX: 34.55 KG/M2 | TEMPERATURE: 97.2 F

## 2017-09-14 DIAGNOSIS — C57.00 MALIGNANT NEOPLASM OF FALLOPIAN TUBE, UNSPECIFIED LATERALITY (HCC): Primary | ICD-10-CM

## 2017-09-14 PROCEDURE — 96413 CHEMO IV INFUSION 1 HR: CPT

## 2017-09-14 PROCEDURE — 96375 TX/PRO/DX INJ NEW DRUG ADDON: CPT

## 2017-09-14 RX ORDER — SODIUM CHLORIDE 9 MG/ML
20 INJECTION, SOLUTION INTRAVENOUS CONTINUOUS
Status: DISCONTINUED | OUTPATIENT
Start: 2017-09-14 | End: 2017-09-17 | Stop reason: HOSPADM

## 2017-09-14 RX ADMIN — FAMOTIDINE 20 MG: 10 INJECTION, SOLUTION INTRAVENOUS at 10:49

## 2017-09-14 RX ADMIN — DIPHENHYDRAMINE HYDROCHLORIDE 25 MG: 50 INJECTION, SOLUTION INTRAMUSCULAR; INTRAVENOUS at 10:32

## 2017-09-14 RX ADMIN — PACLITAXEL 120 MG: 6 INJECTION, SOLUTION, CONCENTRATE INTRAVENOUS at 11:11

## 2017-09-14 RX ADMIN — Medication 300 UNITS: at 12:20

## 2017-09-14 RX ADMIN — DEXAMETHASONE SODIUM PHOSPHATE 20 MG: 10 INJECTION, SOLUTION INTRAMUSCULAR; INTRAVENOUS at 10:14

## 2017-09-14 RX ADMIN — SODIUM CHLORIDE 20 ML/HR: 0.9 INJECTION, SOLUTION INTRAVENOUS at 10:00

## 2017-09-14 NOTE — PLAN OF CARE
Problem: Potential for Falls  Goal: Patient will remain free of falls  INTERVENTIONS:  - Assess patient frequently for physical needs  -  Identify cognitive and physical deficits and behaviors that affect risk of falls    -  [place] fall precautions as indicated by assessment   - Educate patient/family on patient safety including physical limitations  - Instruct patient to call for assistance with activity based on assessment  - Modify environment to reduce risk of injury  - Consider OT/PT consult to assist with strengthening/mobility   Outcome: Progressing

## 2017-09-19 RX ORDER — SODIUM CHLORIDE 9 MG/ML
20 INJECTION, SOLUTION INTRAVENOUS CONTINUOUS
Status: DISCONTINUED | OUTPATIENT
Start: 2017-09-20 | End: 2017-09-23 | Stop reason: HOSPADM

## 2017-09-20 ENCOUNTER — HOSPITAL ENCOUNTER (OUTPATIENT)
Dept: INFUSION CENTER | Facility: CLINIC | Age: 72
Discharge: HOME | End: 2017-09-20
Payer: MEDICARE

## 2017-09-20 VITALS
HEART RATE: 71 BPM | OXYGEN SATURATION: 97 % | SYSTOLIC BLOOD PRESSURE: 118 MMHG | HEIGHT: 61 IN | RESPIRATION RATE: 20 BRPM | DIASTOLIC BLOOD PRESSURE: 64 MMHG | WEIGHT: 196.5 LBS | BODY MASS INDEX: 37.1 KG/M2 | TEMPERATURE: 98.9 F

## 2017-09-20 PROCEDURE — 96413 CHEMO IV INFUSION 1 HR: CPT

## 2017-09-20 PROCEDURE — 96367 TX/PROPH/DG ADDL SEQ IV INF: CPT

## 2017-09-20 RX ORDER — ACETAMINOPHEN 500 MG
1000 TABLET ORAL 4 TIMES DAILY PRN
COMMUNITY
End: 2018-01-31 | Stop reason: ALTCHOICE

## 2017-09-20 RX ADMIN — DIPHENHYDRAMINE HYDROCHLORIDE 25 MG: 50 INJECTION, SOLUTION INTRAMUSCULAR; INTRAVENOUS at 12:08

## 2017-09-20 RX ADMIN — SODIUM CHLORIDE 20 ML/HR: 0.9 INJECTION, SOLUTION INTRAVENOUS at 11:30

## 2017-09-20 RX ADMIN — DEXAMETHASONE SODIUM PHOSPHATE 20 MG: 10 INJECTION, SOLUTION INTRAMUSCULAR; INTRAVENOUS at 11:47

## 2017-09-20 RX ADMIN — FAMOTIDINE 20 MG: 10 INJECTION, SOLUTION INTRAVENOUS at 12:28

## 2017-09-20 RX ADMIN — Medication: at 14:01

## 2017-09-20 RX ADMIN — PACLITAXEL 120 MG: 6 INJECTION, SOLUTION, CONCENTRATE INTRAVENOUS at 12:50

## 2017-09-20 NOTE — PROGRESS NOTES
Patient tolerated all treatment without incident and discharged  She will RTO in 1 week for next dosing

## 2017-09-20 NOTE — PROGRESS NOTES
Patient here for weekly Taxol and is doing well, reports sciatica pain right side, states decadron premed relieves  She does take tylenol and motrin if needed  She cannot tolerated narcotic pain medications

## 2017-09-26 RX ORDER — PALONOSETRON 0.05 MG/ML
0.25 INJECTION, SOLUTION INTRAVENOUS ONCE
Status: COMPLETED | OUTPATIENT
Start: 2017-09-27 | End: 2017-09-27

## 2017-09-26 RX ORDER — SODIUM CHLORIDE 9 MG/ML
20 INJECTION, SOLUTION INTRAVENOUS CONTINUOUS
Status: DISCONTINUED | OUTPATIENT
Start: 2017-09-27 | End: 2017-09-30 | Stop reason: HOSPADM

## 2017-09-27 ENCOUNTER — HOSPITAL ENCOUNTER (OUTPATIENT)
Dept: INFUSION CENTER | Facility: CLINIC | Age: 72
Discharge: HOME | End: 2017-09-27
Payer: MEDICARE

## 2017-09-27 VITALS
HEIGHT: 60 IN | DIASTOLIC BLOOD PRESSURE: 65 MMHG | BODY MASS INDEX: 38.28 KG/M2 | SYSTOLIC BLOOD PRESSURE: 139 MMHG | HEART RATE: 61 BPM | OXYGEN SATURATION: 97 % | TEMPERATURE: 98 F | RESPIRATION RATE: 18 BRPM | WEIGHT: 195 LBS

## 2017-09-27 PROCEDURE — 96413 CHEMO IV INFUSION 1 HR: CPT

## 2017-09-27 PROCEDURE — 96375 TX/PRO/DX INJ NEW DRUG ADDON: CPT

## 2017-09-27 PROCEDURE — 96417 CHEMO IV INFUS EACH ADDL SEQ: CPT

## 2017-09-27 PROCEDURE — 96367 TX/PROPH/DG ADDL SEQ IV INF: CPT

## 2017-09-27 RX ADMIN — CARBOPLATIN 598 MG: 10 INJECTION, SOLUTION INTRAVENOUS at 14:03

## 2017-09-27 RX ADMIN — SODIUM CHLORIDE 20 ML/HR: 0.9 INJECTION, SOLUTION INTRAVENOUS at 10:50

## 2017-09-27 RX ADMIN — PACLITAXEL 120 MG: 6 INJECTION, SOLUTION, CONCENTRATE INTRAVENOUS at 12:54

## 2017-09-27 RX ADMIN — SODIUM CHLORIDE 150 MG: 0.9 INJECTION, SOLUTION INTRAVENOUS at 12:20

## 2017-09-27 RX ADMIN — HEPARIN 300 UNITS: 100 SYRINGE at 15:06

## 2017-09-27 RX ADMIN — FAMOTIDINE 20 MG: 10 INJECTION, SOLUTION INTRAVENOUS at 11:41

## 2017-09-27 RX ADMIN — DIPHENHYDRAMINE HYDROCHLORIDE 25 MG: 50 INJECTION, SOLUTION INTRAMUSCULAR; INTRAVENOUS at 12:01

## 2017-09-27 RX ADMIN — DEXAMETHASONE SODIUM PHOSPHATE 20 MG: 10 INJECTION, SOLUTION INTRAMUSCULAR; INTRAVENOUS at 11:22

## 2017-09-27 RX ADMIN — PALONOSETRON HYDROCHLORIDE 0.25 MG: 0.25 INJECTION INTRAVENOUS at 12:18

## 2017-09-27 NOTE — PROGRESS NOTES
Pt to clinic for cycle #6 of taxol/ carbo infusion   Only complaint is knee and hip pain which dr is aware of, aware of next appointment, declines avs

## 2017-10-04 RX ORDER — SODIUM CHLORIDE 9 MG/ML
20 INJECTION, SOLUTION INTRAVENOUS CONTINUOUS
Status: DISCONTINUED | OUTPATIENT
Start: 2017-10-05 | End: 2017-10-08 | Stop reason: HOSPADM

## 2017-10-05 ENCOUNTER — HOSPITAL ENCOUNTER (OUTPATIENT)
Dept: INFUSION CENTER | Facility: CLINIC | Age: 72
Discharge: HOME | End: 2017-10-05
Payer: MEDICARE

## 2017-10-05 VITALS
BODY MASS INDEX: 38.28 KG/M2 | DIASTOLIC BLOOD PRESSURE: 62 MMHG | HEIGHT: 60 IN | SYSTOLIC BLOOD PRESSURE: 112 MMHG | RESPIRATION RATE: 20 BRPM | TEMPERATURE: 98.4 F | WEIGHT: 195 LBS | HEART RATE: 73 BPM | OXYGEN SATURATION: 97 %

## 2017-10-05 PROCEDURE — 96367 TX/PROPH/DG ADDL SEQ IV INF: CPT

## 2017-10-05 PROCEDURE — 96413 CHEMO IV INFUSION 1 HR: CPT

## 2017-10-05 RX ADMIN — Medication 300 UNITS: at 13:42

## 2017-10-05 RX ADMIN — DEXAMETHASONE SODIUM PHOSPHATE 20 MG: 10 INJECTION, SOLUTION INTRAMUSCULAR; INTRAVENOUS at 11:20

## 2017-10-05 RX ADMIN — FAMOTIDINE 20 MG: 10 INJECTION, SOLUTION INTRAVENOUS at 12:05

## 2017-10-05 RX ADMIN — PACLITAXEL 120 MG: 6 INJECTION, SOLUTION, CONCENTRATE INTRAVENOUS at 12:29

## 2017-10-05 RX ADMIN — SODIUM CHLORIDE 20 ML/HR: 0.9 INJECTION, SOLUTION INTRAVENOUS at 11:00

## 2017-10-05 RX ADMIN — DIPHENHYDRAMINE HYDROCHLORIDE 25 MG: 50 INJECTION, SOLUTION INTRAMUSCULAR; INTRAVENOUS at 11:41

## 2017-10-12 RX ORDER — SODIUM CHLORIDE 9 MG/ML
20 INJECTION, SOLUTION INTRAVENOUS CONTINUOUS
Status: DISCONTINUED | OUTPATIENT
Start: 2017-10-13 | End: 2017-10-16 | Stop reason: HOSPADM

## 2017-10-13 ENCOUNTER — HOSPITAL ENCOUNTER (OUTPATIENT)
Dept: INFUSION CENTER | Facility: CLINIC | Age: 72
Discharge: HOME | End: 2017-10-13
Payer: MEDICARE

## 2017-10-13 VITALS
DIASTOLIC BLOOD PRESSURE: 62 MMHG | SYSTOLIC BLOOD PRESSURE: 122 MMHG | RESPIRATION RATE: 16 BRPM | WEIGHT: 199 LBS | OXYGEN SATURATION: 99 % | BODY MASS INDEX: 37.57 KG/M2 | HEART RATE: 65 BPM | TEMPERATURE: 98.6 F | HEIGHT: 61 IN

## 2017-10-13 PROCEDURE — 96413 CHEMO IV INFUSION 1 HR: CPT

## 2017-10-13 PROCEDURE — 96367 TX/PROPH/DG ADDL SEQ IV INF: CPT

## 2017-10-13 RX ORDER — ACETAMINOPHEN 325 MG/1
650 TABLET ORAL ONCE
Status: COMPLETED | OUTPATIENT
Start: 2017-10-13 | End: 2017-10-13

## 2017-10-13 RX ADMIN — DEXAMETHASONE SODIUM PHOSPHATE 20 MG: 10 INJECTION, SOLUTION INTRAMUSCULAR; INTRAVENOUS at 10:33

## 2017-10-13 RX ADMIN — FAMOTIDINE 20 MG: 10 INJECTION, SOLUTION INTRAVENOUS at 11:14

## 2017-10-13 RX ADMIN — PACLITAXEL 120 MG: 6 INJECTION, SOLUTION, CONCENTRATE INTRAVENOUS at 11:38

## 2017-10-13 RX ADMIN — Medication 300 UNITS: at 12:43

## 2017-10-13 RX ADMIN — SODIUM CHLORIDE 20 ML/HR: 0.9 INJECTION, SOLUTION INTRAVENOUS at 10:15

## 2017-10-13 RX ADMIN — DIPHENHYDRAMINE HYDROCHLORIDE 25 MG: 50 INJECTION, SOLUTION INTRAMUSCULAR; INTRAVENOUS at 10:52

## 2017-10-13 RX ADMIN — ACETAMINOPHEN 650 MG: 325 TABLET ORAL at 12:25

## 2017-10-13 NOTE — PROGRESS NOTES
Patient tolerated all treatment without incident  Earlier today she did c/o of headache and requested tylenol, called [employer] [employer] and she said she would send order for one dose tylenol for patient  Order never received  Called again and order sent, patient dosed for headache 1225  Patient offers no c/o at this time and discharged  Today was her final cycle  She will RTO at physician's discretion for port flushes

## 2017-10-13 NOTE — PROGRESS NOTES
Patient here for weekly taxol dosing and is doing well, still has right sided buttock/leg pain, on oxycontin daily prn for severe back pain  No other c/o

## 2017-10-20 ENCOUNTER — HOSPITAL ENCOUNTER (OUTPATIENT)
Dept: CT IMAGING | Facility: HOSPITAL | Age: 72
Discharge: HOME | End: 2017-10-20
Payer: MEDICARE

## 2017-10-20 DIAGNOSIS — R39.89 OTHER SYMPTOMS AND SIGNS INVOLVING THE GENITOURINARY SYSTEM: ICD-10-CM

## 2017-10-20 DIAGNOSIS — C57.00 MALIGNANT NEOPLASM OF FALLOPIAN TUBE (HCC): ICD-10-CM

## 2017-10-20 PROCEDURE — 74177 CT ABD & PELVIS W/CONTRAST: CPT

## 2017-10-20 PROCEDURE — 71260 CT THORAX DX C+: CPT

## 2017-10-20 RX ADMIN — IOHEXOL 100 ML: 350 INJECTION, SOLUTION INTRAVENOUS at 08:56

## 2017-10-25 ENCOUNTER — ALLSCRIPTS OFFICE VISIT (OUTPATIENT)
Dept: OTHER | Facility: OTHER | Age: 72
End: 2017-10-25

## 2017-10-25 NOTE — PROGRESS NOTES
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 and synchronous stage IA ovarian cancer who is receiving adjuvant chemotherapy treatment dose-dense taxol and carboplatin  Overall, she is tolerating treatment well  She has grade 1 chemotherapy-induced neutropathy of her fingertips, which is not affecting her ADLs  She has dysuria and urinary frequency  Her performance status is zero  Plan  1  Plan for dose-reduction of taxol to 65 mg/m2 given new neuropathy  Continue with chemotherapy as scheduled  2  Check urinalysis with C&S due to new onset urinary symptoms  3  Return to the office as per her chemotherapy calendar  Chief Complaint  Chief Complaint Free Text Note Form: Pre-chemotherapy evaluation  History of Present Illness  Problem [facility]:   #1  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Hepatitis: AST 1478, ALT 1139 March 1, 2017  Non-viral  Resolved  Previous Therapy:   #1  May 11, 2017: Exploratory laparotomy, tumor debulking with radical hysterectomy, bilateral salpingo-oophorectomy, radical resection of pelvic tumor with low anterior resection, en bloc pelvic peritoneum adenectomy, resection of 2 5 cm right diaphragm nodule and gastrocolic omentectomy  Final pathology with 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 and synchronous stage I a left ovarian endometrioid adenocarcinoma  Jen 15, 2017: Began chemotherapy with weekly Taxol 80mg/m2 and Carboplatin AUC 6 q3 weeks  She is scheduled for cycle 5 of treatment  Free Text HPI: This is a 79-year-old who presents to the office for pre-chemotherapy evaluation  Overall, the patient is tolerating treatment well  She has been afebrile  She notes urinary urgency with dysuria  This began this morning  She has been afebrile  The patient notes intermittent constipation which is relieved with metamucil  She denies vomiting  The patient notes neuropathy of her fingertips  This is constant  It is not affecting her ADLs  She is ambulatory  Review of Systems  Complete-Female Gyn Onc:   Constitutional: No fever, no recent weight gain, no chills, not feeling tired and no recent weight loss  Eyes: No complaints of eye pain, no red eyes, no eyesight problems, no discharge, no dry eyes, no itching of eyes  ENT: no nose bleeds  Cardiovascular: No chest pain, no lower extremity edema  Respiratory: No shortness of breath  Gastrointestinal: as noted in HPI  Genitourinary: as noted in HPI  Musculoskeletal: No limb swelling  Integumentary: No skin lesions  Neurological: numbness-- and-- tingling  Psychiatric: Not suicidal, no sleep disturbance, no anxiety or depression, no change in personality, no emotional problems  Endocrine: No complaints of proptosis, no hot flashes, no muscle weakness, no deepening of the voice, no feelings of weakness  Hematologic/Lymphatic: No complaints of swollen glands, no swollen glands in the neck, does not bleed easily, does not bruise easily  Active Problems  1  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,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) (G62 0,T45  1X5A)   2  Colon cancer screening (V76 51) (Z12 11)   3  Essential hypertension (401 9) (I10)   4  Fallopian tube cancer, 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)   5  Headache (784 0) (R51)   6  Localized swelling of both lower extremities (729 81) (M79 89)    Past Medical History  1  History of hepatitis (V12 09) (Z86 19)    Surgical History  1  History of Appendectomy   2  History of Gallbladder Surgery   3  History of Laparoscopy (Diagnostic)   4  History of Partial Colectomy - Sigmoid   5  History of Proctectomy Partial, With Anastomosis   6  History of Radical Total Abdominal Hysterectomy   7   History of Removal Of Tubal Pregnancy 8  History of Resection Of Omentum   9  History of Resection Of Ovarian Malig  + BSO, Omentectomy, Debulking   10  History of Salpingo-oophorectomy Bilateral   11  History of Tonsillectomy    Social History   · Never smoked   · No alcohol use   · Retired    Current Meds   1  Atenolol 50 MG Oral Tablet; TAKE 1 TABLET DAILY; Therapy: (Recorded:24Mar2017) to Recorded   2  Betamethasone Dipropionate 0 05 % External Lotion; APPLY SPARINGLY TO AFFECTED   AREA(S) TWICE DAILY; Therapy: [id number] to (Last Rx:[id number]) Ordered   3  CARBOplatin 150 MG/15ML Intravenous Solution; Chemo orders; Therapy: [id number] to (Last Rx:01Jun2017) Ordered   4  PACLitaxel 100 MG/16 7ML Intravenous Concentrate; as directed; Therapy: [id number] to (Last Rx:01Jun2017) Ordered   5  Potassium Chloride ER 10 MEQ Oral Capsule Extended Release; 1capsule daily   Recorded   6  Tylenol Extra Strength 500 MG Oral Tablet; TAKE 2 TABLETS ONCE DAILY; Therapy: ([facility]) to Recorded   7  Vitamin D 2000 UNIT Oral Capsule; take 1 capsule daily; Therapy: (Recorded:24Mar2017) to Recorded  Medication List Reviewed: The medication list was reviewed and updated today  Allergies  1  morphine   2  Percocet TABS   3  Tramadol  4  Shellfish    Vitals  Vital Signs    Recorded: 31Aug2017 09:53AM   Temperature 97 9 F, Oral   Heart Rate 62, R Radial   Pulse Quality Normal, R Radial   Respiration Quality Normal   Respiration 14   Systolic 689, RUE, Sitting   Diastolic 72, RUE, Sitting   Height 5 ft 0 25 in   Weight 195 lb    BMI Calculated 37 77   BSA Calculated 1 85     Physical Exam    Constitutional   General appearance: No acute distress, well appearing and well nourished  Pulmonary   Respiratory effort: No increased work of breathing or signs of respiratory distress  Skin   Skin and subcutaneous tissue: Normal skin turgor and no rashes      Psychiatric   Orientation to person, place, and time: Normal     Mood and affect: Normal  GOG performance status is: 0      End of Encounter Meds  1  Atenolol 50 MG Oral Tablet; TAKE 1 TABLET DAILY; Therapy: (Recorded:24Mar2017) to Recorded  2  CARBOplatin 150 MG/15ML Intravenous Solution; Chemo orders; Therapy: [id number] to (Last Rx:01Jun2017) Ordered   3  PACLitaxel 100 MG/16 7ML Intravenous Concentrate; as directed; Therapy: [id number] to (Last Rx:01Jun2017) Ordered  4  Betamethasone Dipropionate 0 05 % External Lotion; APPLY SPARINGLY TO AFFECTED   AREA(S) TWICE DAILY; Therapy: [id number] to (Last Rx:[id number]) Ordered  5  Tylenol Extra Strength 500 MG Oral Tablet; TAKE 2 TABLETS ONCE DAILY; Therapy: ([facility]) to Recorded  6  Vitamin D 2000 UNIT Oral Capsule; take 1 capsule daily; Therapy: (Recorded:24Mar2017) to Recorded  7  Potassium Chloride ER 10 MEQ Oral Capsule Extended Release; 1capsule daily   Recorded    Future Appointments    Date/Time Provider Specialty Site   09/14/2017 09:15 AM N[facility] Gynecological Oncology [facility] Rashaun Ramos   10/25/2017 01:30 PM Megan Lee MD Gynecological Oncology [facility]     Signatures   Electronically signed by : [facility];  Aug 31 2017 10:26AM EST                       (Author)    Electronically signed by : Gogo Ochoa MD; Aug 31 2017  1:25PM EST                       (Author)

## 2017-10-26 NOTE — PROGRESS NOTES
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: Surgery with complete cytoreduction to no gross visible residual disease  Now status post 6 cycles of chemotherapy  CT scan after completion of treatment October 2017 shows no evidence of disease  will have her Port-A-Cath flushed every 6-8 weeks  I plan to see her back in 3 months with a pre visit CA 1-5  She will contact me sooner if she develops any new symptoms  History of hepatitis: Workup negative for viral causes  No evidence of cirrhosis  Transaminase levels normalized  Concern for cancer genetic susceptibility syndrome: Patient underwent genetic testing, wild type for all screened genes with [facility]  Chief Complaint  Chief Complaint Free Text Note Form: Evaluation after completion of chemotherapy  History of Present Illness  Problem [facility]:   #1  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Hepatitis: AST 1478, ALT 1139 March 1, 2017  Non-viral  Resolved     Previous Therapy:   #1  May 11, 2017: Exploratory laparotomy, tumor debulking with radical hysterectomy, bilateral salpingo-oophorectomy, radical resection of pelvic tumor with low anterior resection, en bloc pelvic peritoneum adenectomy, resection of 2 5 cm right diaphragm nodule and gastrocolic omentectomy  Final pathology with 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 and 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  Complete cytoreduction to no gross visible residual disease  Jen 15, 2017: Began chemotherapy with weekly Taxol 80mg/m2 and Carboplatin AUC 6 q3 weeks  Completed cycle 6 in October 2017  Free Text HPI:     Tolerated chemotherapy well  Has no complaints  Denies vaginal bleeding, drainage or discharge  Normal bowel bladder function  Good appetite  Other pieces starting to resolve  Denies neuropathy  CT scan of completion of therapy showed no evidence of disease  Review of SystemsComplete-Female Gyn Onc:   Constitutional: No fever, no recent weight gain, no chills, not feeling tired and no recent weight loss  Eyes: No complaints of eye pain, no red eyes, no eyesight problems, no discharge, no dry eyes, no itching of eyes  ENT: no nose bleeds  Cardiovascular: No chest pain, no lower extremity edema  Respiratory: No shortness of breath  Gastrointestinal: No abdominal pain, no constipation, no nausea or vomiting, no bloody stools  Genitourinary: No complaints of dysuria, no incontinence, no pelvic pain, no dysmenorrhea, no vaginal discharge or bleeding  Musculoskeletal: as noted in HPI  Integumentary: No skin lesions  Neurological: tingling  Psychiatric: Not suicidal, no sleep disturbance, no anxiety or depression, no change in personality, no emotional problems  Endocrine: No complaints of proptosis, no hot flashes, no muscle weakness, no deepening of the voice, no feelings of weakness  Hematologic/Lymphatic: No complaints of swollen glands, no swollen glands in the neck, does not bleed easily, does not bruise easily  Active Problems  1  Abdominal pain (789 00) (R10 9)   2  Colon cancer screening (V76 51) (Z12 11)   3  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)   4   Essential hypertension (401 9) (I10)   5  Fallopian tube cancer, 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)   6  Folliculitis (841 8) (D39 8)   7  Headache (784 0) (R51)   8  History of hepatitis (V12 09) (Z86 19)   9  Localized swelling of both lower extremities (729 81) (M79 89)   10  Low back pain, unspecified back pain laterality, unspecified chronicity, with sciatica    presence unspecified (724 2) (M54 5)   11  Lumbar back pain (724 2) (M54 5)   12  Lumbar radiculopathy (724 4) (M54 16)   13  Sinus pressure (478 19) (J34 89)    Past Medical History  1  History of 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,E933 1) (G62 0,T45  1X5A)   2  History of hepatitis (V12 09) (Z86 19)  Active Problems And Past Medical History Reviewed: The active problems and past medical history were reviewed and updated today  Surgical History  1  History of Appendectomy   2  History of Gallbladder Surgery   3  History of Laparoscopy (Diagnostic)   4  History of Partial Colectomy - Sigmoid   5  History of Proctectomy Partial, With Anastomosis   6  History of Radical Total Abdominal Hysterectomy   7  History of Removal Of Tubal Pregnancy   8  History of Resection Of Omentum   9  History of Resection Of Ovarian Malig  + BSO, Omentectomy, Debulking   10  History of Salpingo-oophorectomy Bilateral   11  History of Tonsillectomy  Surgical History Reviewed: The surgical history was reviewed and updated today  Family History  Sister    1  Family history of malignant neoplasm of breast (V16 3) (Z80 3)  Other    2  Family history of Endometrial carcinoma   3  Family history of malignant neoplasm of stomach (V16 0) (Z80 0)  Family History Reviewed: The family history was reviewed and updated today  Social History   · Never smoked   · No alcohol use   · Retired  Social History Reviewed: The social history was reviewed and updated today  The social history was reviewed and is unchanged  Current Meds   1  Atenolol 50 MG Oral Tablet; TAKE 1 TABLET DAILY;    Therapy: (Recorded:24Mar2017) to Recorded   2  Vitamin D 2000 UNIT Oral Capsule; take 1 capsule daily; Therapy: (Recorded:24Mar2017) to Recorded  Medication List Reviewed: The medication list was reviewed and updated today  Allergies  1  morphine   2  Percocet TABS   3  Tramadol  4  Shellfish    Vitals  Vital Signs    Recorded: 89SQQ9277 01:56PM   Temperature 98 1 F, Oral   Heart Rate 80, R Radial   Pulse Quality Normal, R Radial   Respiration Quality Normal   Respiration 14   Systolic 929, LUE, Sitting   Diastolic 72, LUE, Sitting   Height 5 ft 0 25 in   Weight 196 lb 1 oz   BMI Calculated 37 97   BSA Calculated 1 86     Physical Exam    Constitutional   General appearance: No acute distress, well appearing and well nourished  Pulmonary   Respiratory effort: No increased work of breathing or signs of respiratory distress  Skin   Skin and subcutaneous tissue: Normal skin turgor and no rashes  Psychiatric   Orientation to person, place, and time: Normal     Mood and affect: Normal     GOG performance status is: 0      Results/Data  * CT CHEST ABDOMEN PELVIS W CONTRAST [id number] 08:34AM Smart[employer] system File   TW Order Number: [id number]    - Patient Instructions: TO BE DONE AT 10/20/2017 AT 9:00AM  AT [facility]  PLEASE ARRIVE 15 MINUTES EARLY  Prep given, CLEAR LIQUIDS ONLY 3 HOURS PRIOR  Test Name Result Flag Reference   CT CHEST ABDOMEN PELVIS W CONTRAST (Report)     CT CHEST, ABDOMEN AND PELVIS WITH IV CONTRAST     INDICATION: Malignant neoplasm of fallopian tube, evaluate for metastasis     COMPARISON: 7/18/2017     TECHNIQUE: CT examination of the chest, abdomen and pelvis was performed  Reformatted images were created in axial, sagittal, and coronal planes  Radiation dose length product (DLP) for this visit: 676 mGy-cm    This examination, like all CT scans performed in the [facility], was performed utilizing techniques to minimize radiation dose exposure, including the use of iterative    reconstruction and automated exposure control  IV Contrast: 100 mL of iohexol (OMNIPAQUE)      Enteric Contrast: Enteric contrast was administered  FINDINGS:     CHEST     LUNGS: Lungs are clear  There is no tracheal or endobronchial lesion  PLEURA: Unremarkable  HEART/GREAT VESSELS: Unremarkable for patient's age  MEDIASTINUM AND ROSALIA: Unremarkable  CHEST WALL AND LOWER NECK:    Normal      ABDOMEN     LIVER/BILIARY TREE: There is diffuse hepatic steatosis  A too small to characterize hypodense lesion in the lateral segment of the left hepatic lobe is unchanged since 2008  GALLBLADDER: Gallbladder is surgically absent  SPLEEN: Unremarkable  PANCREAS: Unremarkable  ADRENAL GLANDS: Unremarkable  KIDNEYS/URETERS: There is a left renal cyst as well as focus of cortical scarring, unchanged    No hydronephrosis  STOMACH AND BOWEL: Unremarkable  APPENDIX: No findings to suggest appendicitis  ABDOMINOPELVIC CAVITY: No ascites or free intraperitoneal air  Density associated with the TRINI appears improved  There continues to be mild stranding in the left upper quadrant without focal nodularity  This has slightly improved compared to the    prior study  VESSELS: There is a thrombus within the right ovarian vein remnant, unchanged from the prior study  There is soft tissue density anterior to the left psoas muscle is associated with the left ovarian vein remnant, present previously and unchanged  PELVIS     REPRODUCTIVE ORGANS: Patient is status post hysterectomy  URINARY BLADDER: Unremarkable  ABDOMINAL WALL/INGUINAL REGIONS: Unremarkable  OSSEOUS STRUCTURES: No acute fracture or destructive osseous lesion  IMPRESSION:     1  No evidence of metastatic disease in the chest, abdomen, or pelvis  2  Improving stranding in the left upper quadrant, likely postoperative related to omentectomy       3  Right ovarian vein remnant thrombosis, 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

## 2017-11-07 ENCOUNTER — APPOINTMENT (OUTPATIENT)
Dept: LAB | Facility: HOSPITAL | Age: 72
End: 2017-11-07
Payer: MEDICARE

## 2017-11-07 DIAGNOSIS — R39.89 OTHER SYMPTOMS AND SIGNS INVOLVING THE GENITOURINARY SYSTEM: ICD-10-CM

## 2017-11-07 LAB
BILIRUB UR QL STRIP: NEGATIVE
CLARITY UR: CLEAR
COLOR UR: YELLOW
GLUCOSE UR STRIP-MCNC: NEGATIVE MG/DL
HGB UR QL STRIP.AUTO: NEGATIVE
KETONES UR STRIP-MCNC: NEGATIVE MG/DL
LEUKOCYTE ESTERASE UR QL STRIP: NEGATIVE
NITRITE UR QL STRIP: NEGATIVE
PH UR STRIP.AUTO: 6 [PH] (ref 4.5–8)
PROT UR STRIP-MCNC: NEGATIVE MG/DL
SP GR UR STRIP.AUTO: <=1.005 (ref 1–1.03)
UROBILINOGEN UR QL STRIP.AUTO: 0.2 E.U./DL

## 2017-11-07 PROCEDURE — 81003 URINALYSIS AUTO W/O SCOPE: CPT

## 2017-12-01 ENCOUNTER — HOSPITAL ENCOUNTER (OUTPATIENT)
Dept: INFUSION CENTER | Facility: CLINIC | Age: 72
Discharge: HOME | End: 2017-12-01
Payer: MEDICARE

## 2017-12-01 PROCEDURE — 96523 IRRIG DRUG DELIVERY DEVICE: CPT

## 2017-12-01 RX ADMIN — Medication 300 UNITS: at 09:12

## 2018-01-03 ENCOUNTER — TRANSCRIBE ORDERS (OUTPATIENT)
Dept: ADMINISTRATIVE | Facility: HOSPITAL | Age: 73
End: 2018-01-03

## 2018-01-03 DIAGNOSIS — G47.30 SLEEP APNEA, UNSPECIFIED TYPE: Primary | ICD-10-CM

## 2018-01-12 VITALS
SYSTOLIC BLOOD PRESSURE: 142 MMHG | WEIGHT: 192.19 LBS | HEIGHT: 60 IN | BODY MASS INDEX: 37.73 KG/M2 | TEMPERATURE: 98.4 F | HEART RATE: 64 BPM | DIASTOLIC BLOOD PRESSURE: 86 MMHG | RESPIRATION RATE: 14 BRPM

## 2018-01-12 VITALS
DIASTOLIC BLOOD PRESSURE: 72 MMHG | BODY MASS INDEX: 38.49 KG/M2 | HEIGHT: 60 IN | HEART RATE: 80 BPM | TEMPERATURE: 98.1 F | RESPIRATION RATE: 14 BRPM | SYSTOLIC BLOOD PRESSURE: 118 MMHG | WEIGHT: 196.06 LBS

## 2018-01-13 VITALS
BODY MASS INDEX: 36.76 KG/M2 | HEART RATE: 56 BPM | HEIGHT: 60 IN | TEMPERATURE: 93.3 F | DIASTOLIC BLOOD PRESSURE: 80 MMHG | RESPIRATION RATE: 14 BRPM | SYSTOLIC BLOOD PRESSURE: 144 MMHG | WEIGHT: 187.25 LBS

## 2018-01-13 VITALS
RESPIRATION RATE: 14 BRPM | TEMPERATURE: 97.3 F | HEIGHT: 60 IN | DIASTOLIC BLOOD PRESSURE: 62 MMHG | SYSTOLIC BLOOD PRESSURE: 132 MMHG | BODY MASS INDEX: 36.97 KG/M2 | WEIGHT: 188.31 LBS | HEART RATE: 57 BPM

## 2018-01-14 VITALS
RESPIRATION RATE: 16 BRPM | TEMPERATURE: 98.7 F | BODY MASS INDEX: 38.37 KG/M2 | DIASTOLIC BLOOD PRESSURE: 84 MMHG | WEIGHT: 195.44 LBS | SYSTOLIC BLOOD PRESSURE: 140 MMHG | HEART RATE: 71 BPM | HEIGHT: 60 IN

## 2018-01-14 VITALS
HEIGHT: 60 IN | SYSTOLIC BLOOD PRESSURE: 138 MMHG | TEMPERATURE: 97.9 F | BODY MASS INDEX: 38.28 KG/M2 | RESPIRATION RATE: 14 BRPM | WEIGHT: 195 LBS | DIASTOLIC BLOOD PRESSURE: 72 MMHG | HEART RATE: 62 BPM

## 2018-01-14 VITALS
WEIGHT: 194.13 LBS | DIASTOLIC BLOOD PRESSURE: 70 MMHG | SYSTOLIC BLOOD PRESSURE: 136 MMHG | HEART RATE: 56 BPM | BODY MASS INDEX: 38.11 KG/M2 | HEIGHT: 60 IN | TEMPERATURE: 97.2 F | OXYGEN SATURATION: 98 %

## 2018-01-14 VITALS
BODY MASS INDEX: 37.95 KG/M2 | DIASTOLIC BLOOD PRESSURE: 84 MMHG | RESPIRATION RATE: 20 BRPM | SYSTOLIC BLOOD PRESSURE: 150 MMHG | HEIGHT: 60 IN | HEART RATE: 72 BPM | TEMPERATURE: 98.2 F | WEIGHT: 193.31 LBS

## 2018-01-14 NOTE — MISCELLANEOUS
Message  GI Reminder Recall Graciela Lizama:   Date: 06/01/2017   Dear Marissa Ventura:     Review of our records shows you are due for the following: Colonoscopy  Our records indicate that you are due at this time to have a follow-up examination for a colonoscopy  As you now, these tests are done to prevent colon cancer, a very common disease in the [place] and responsible for the thousands of patient deaths each year  We at [facility] are concerned for your health, and would very much appreciate you getting in touch with us at your earliest convenience, Again, this examination is vital to your proper health maintenance and for the prevention of cancer  We have attempted to reach you and have been unsuccessful  Please contact our office to schedule your procedure  Thank You       Please call the following office to schedule your appointment:   [address], [address], [place], [facility], [address] [facility] [phone]  We look forward to hearing from you!      Sincerely,         Signatures   Electronically signed by : Cameron Curiel, ; Jun 1 2017  1:26PM EST                       (Author)

## 2018-01-15 NOTE — MISCELLANEOUS
Message  GI Reminder Recall [facility]:   Date: 08/08/2017   Dear Prasanna Rubalcava:     Review of our records shows you are due for the following: Follow Up Visit  Please call the following office to schedule your appointment:   [address], [address], [place], [place], [address] [facility] [phone]  We look forward to hearing from you!      Sincerely,     [facility]      Signatures   Electronically signed by : Cedrick Collier, ; Aug  8 2017 11:30AM EST                       (Author)

## 2018-01-16 NOTE — RESULT NOTES
Discussion/Summary   significant improvement in blood work with almost normal liver enzymes  We will repeat in 3 months  She needs a colonoscopy as well and we will scheudle that fr her  Verified Results  (1) COMPREHENSIVE METABOLIC PANEL [id number] 06:48SC Katia Current Order Number: [id number]     Test Name Result Flag Reference   SODIUM 143 mmol/L  136-145   POTASSIUM 4 0 mmol/L  3 5-5 3   CHLORIDE 106 mmol/L  100-108   CARBON DIOXIDE 30 mmol/L  21-32   ANION GAP (CALC) 7 mmol/L  4-13   BLOOD UREA NITROGEN 13 mg/dL  5-25   CREATININE 0 91 mg/dL  0 60-1 30   Standardized to IDMS reference method   CALCIUM 9 4 mg/dL  8 3-10 1   BILI, TOTAL 0 30 mg/dL  0 20-1 00   ALK PHOSPHATAS 119 U/L H    ALT (SGPT) 44 U/L  12-78   AST(SGOT) 31 U/L  5-45   ALBUMIN 3 3 g/dL L 3 5-5 0   TOTAL PROTEIN 7 1 g/dL  6 4-8 2   eGFR Non-African American      >60 0 ml/min/1 [address] [facility] Disease Education Program recommendations are as follows:  GFR calculation is accurate only with a steady state creatinine  Chronic Kidney disease less than 60 ml/min/1 73 sq  meters  Kidney failure less than 15 ml/min/1 73 sq  meters  GLUCOSE FASTING 101 mg/dL H 65-99     (1) PT WITH INR 30Nam6332 09:13AM [facility]   TW Order Number: [id number]     Test Name Result Flag Reference   INR 1 09  0 86-1 16   PT 14 0 seconds  12 0-14 3     (1) SMOOTH MUSCLE ANTIBODY 98Tyl0244 09:13AM [facility]   TW Order Number: [id number]     Test Name Result Flag Reference   SMOOTH MUS AB 14 Units  0 - 19   Negative                     0 - 19                   Weak positive               20 - 30                   Moderate to strong positive     >30   Actin Antibodies are found in 52-85% of patients with   autoimmune hepatitis or chronic active hepatitis and   in 22% of patients with primary biliary cirrhosis      Performed at:  [address] [place] [address]  [id number]  : Viridiana Szymanski Marcial Belcher MD, Phone:  [phone]     (1) GRICELDA SCREEN W/REFLEX TO TITER/PATTERN 17QRY6661 09:13AM [facility] Order Number: [id number]     Test Name Result Flag Reference   GRICELDA SCREEN  Negative  Negative     (1) FERRITIN 55Nph1759 09:13AM [facility] Order Number: [id number]     Test Name Result Flag Reference   FERRITIN 26 ng/mL  8-388     (1) IRON SATURATION %, TIBC 90Ibd6952 09:13AM [facility] Order Number: [id number]     Test Name Result Flag Reference   IRON SATURATION 13 %     TOTAL IRON BINDING CAPACITY 314 ug/dL  250-450   IRON 42 ug/dL L    Patients treated with metal-binding drugs (ie  Deferoxamine) may have depressed iron values  Plan  Colon cancer screening    · Suprep Bowel Prep Oral Solution; DILUTE CONTENTS AND USE AS DIRECTED  FOR BOWEL PREP   · COLONOSCOPY (GI, SURG);  Status:Hold For - Scheduling; Requested for:23Apr2017;

## 2018-01-22 VITALS
SYSTOLIC BLOOD PRESSURE: 140 MMHG | HEART RATE: 88 BPM | RESPIRATION RATE: 14 BRPM | BODY MASS INDEX: 38.34 KG/M2 | DIASTOLIC BLOOD PRESSURE: 80 MMHG | WEIGHT: 195.31 LBS | TEMPERATURE: 98.2 F | HEIGHT: 60 IN

## 2018-01-29 ENCOUNTER — TELEPHONE (OUTPATIENT)
Dept: GYNECOLOGIC ONCOLOGY | Facility: CLINIC | Age: 73
End: 2018-01-29

## 2018-01-30 ENCOUNTER — TELEPHONE (OUTPATIENT)
Dept: GYNECOLOGIC ONCOLOGY | Facility: CLINIC | Age: 73
End: 2018-01-30

## 2018-01-31 ENCOUNTER — OFFICE VISIT (OUTPATIENT)
Dept: GYNECOLOGIC ONCOLOGY | Facility: CLINIC | Age: 73
End: 2018-01-31
Payer: MEDICARE

## 2018-01-31 VITALS
WEIGHT: 190.3 LBS | SYSTOLIC BLOOD PRESSURE: 128 MMHG | DIASTOLIC BLOOD PRESSURE: 74 MMHG | TEMPERATURE: 97.9 F | HEART RATE: 84 BPM | BODY MASS INDEX: 35.02 KG/M2 | HEIGHT: 62 IN | RESPIRATION RATE: 18 BRPM

## 2018-01-31 DIAGNOSIS — Z12.11 SCREENING FOR COLON CANCER: ICD-10-CM

## 2018-01-31 DIAGNOSIS — Z12.39 BREAST CANCER SCREENING: ICD-10-CM

## 2018-01-31 DIAGNOSIS — C57.00 CARCINOMA OF FALLOPIAN TUBE, UNSPECIFIED LATERALITY (HCC): Primary | ICD-10-CM

## 2018-01-31 PROBLEM — R19.00 PELVIC MASS: Status: RESOLVED | Noted: 2017-05-11 | Resolved: 2018-01-31

## 2018-01-31 PROBLEM — C78.6 PERITONEAL CARCINOMATOSIS (HCC): Status: RESOLVED | Noted: 2017-05-11 | Resolved: 2018-01-31

## 2018-01-31 PROCEDURE — 99214 OFFICE O/P EST MOD 30 MIN: CPT | Performed by: OBSTETRICS & GYNECOLOGY

## 2018-01-31 RX ORDER — TROSPIUM CHLORIDE ER 60 MG/1
CAPSULE ORAL DAILY
Refills: 0 | Status: ON HOLD | COMMUNITY
Start: 2017-12-01 | End: 2018-04-07 | Stop reason: ALTCHOICE

## 2018-01-31 NOTE — PROGRESS NOTES
Assessment/Plan:    Problem List Items Addressed This Visit        Genitourinary    Fallopian tube cancer, carcinoma [facility]) - Primary     -   Pre visit  at [facility] January 2018 was 1 units/milliliter  She has clinically no evidence of disease  She will return to the office in 3 months with pre visit   She will continue to have her Port-A-Cath flushed every 6-8 weeks  -   Of note, report some constipation and rectal pressure  Physical exam with no pelvic masses or compression  Will obtain screening colonoscopy  See below  Relevant Orders           Other    Breast cancer screening    Relevant Orders    Mammo screening bilateral w cad            CHIEF COMPLAINT:     Presents for disease surveillance  Complains of constipation rectal pressure despite routine Miralax use  Previous therapy:     Fallopian tube cancer, carcinoma ([facility] [address] )    5/11/2017 Initial Diagnosis     #1  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  #2  Hepatitis: AST 1478, ALT 1139 March 1, 2017  Non-viral  Resolved  5/11/2017 Surgery     Exploratory laparotomy, tumor debulking with radical hysterectomy, bilateral salpingo-oophorectomy, radical resection of pelvic tumor with low anterior resection, en bloc pelvic peritoneum adenectomy, resection of 2 5 cm right diaphragm nodule and gastrocolic omentectomy  Final pathology with 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 and 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  Complete cytoreduction to no gross visible residual disease  6/15/2017 - 10/2017 Chemotherapy     Began chemotherapy with weekly Taxol 80mg/m2 and Carboplatin AUC 6 q3 weeks  Completed cycle 6 in October 2017                Patient ID: Jon Theodore is a 72 y o  female  HPI    Patient with history of synchronous fallopian tube cancer ( stage IIIC) and stage IA ovarian endometrioid adenocarcinoma  She completed adjuvant chemotherapy  Imaging at completion demonstrated no residual disease  She is here for surveillance  Pre visit  is reassuring  Reports constipation and rectal pressure  Otherwise no other complaints  Denies vaginal bleeding, drainage or discharge  Normal bladder function  The following portions of the patient's history were reviewed and updated as appropriate: allergies, current medications, past family history, past medical history, past social history, past surgical history and problem list     Review of Systems   Constitutional: Negative  Negative for fever and unexpected weight change  HENT: Negative  Eyes: Negative  Respiratory: Negative  Cardiovascular: Negative  Gastrointestinal: Positive for constipation  Rectal pressure  Endocrine: Negative  Genitourinary: Negative  Musculoskeletal: Negative  Skin: Negative  Neurological: Negative  Psychiatric/Behavioral: Negative  Current Outpatient Prescriptions   Medication Sig Dispense Refill    atenolol (TENORMIN) 50 mg tablet Take 50 mg by mouth daily      Cholecalciferol (VITAMIN D3) 2000 units TABS Take 1 tablet by mouth daily      esomeprazole (NexIUM) 20 mg capsule Take 20 mg by mouth as needed      OxyCODONE HCl (OXYCONTIN PO) Take 5 mg by mouth daily as needed        polyethylene glycol (MIRALAX) 17 g packet Take 17 g by mouth daily      ranitidine (ZANTAC) 150 MG capsule Take 150 mg by mouth as needed for indigestion or heartburn      trospium (SANCTURA XR) 60 mg 24 hr capsule daily  0     No current facility-administered medications for this visit  Objective:    Blood pressure 128/74, pulse 84, temperature 97 9 °F (36 6 °C), temperature source Oral, resp  rate 18, height 5' 1 5" (1 562 m), weight 86 3 kg (190 lb 4 8 oz), not currently breastfeeding      Physical Exam   Constitutional: She is oriented to person, place, and time  She appears well-developed and well-nourished  HENT:   Head: Normocephalic and atraumatic  Neck: Normal range of motion  Neck supple  No thyromegaly present  Pulmonary/Chest: Effort normal    Abdominal: Soft  She exhibits no distension and no mass  There is no rebound  Genitourinary:   Genitourinary Comments: The external female genitalia is normal  The bartholin's, uretheral and skenes glands are normal  The urethral meatus is normal  Speculum exam reveals a grossly normal vagina  No masses, lesions or bleeding  Manual exam notes a surgicalyl absent cervix, uterus and adnexal structures  No masses or fullness  Rectal exam negative for intraluminal or extraluminal palpable lesions  Musculoskeletal: Normal range of motion  She exhibits no edema  Lymphadenopathy:     She has no cervical adenopathy  Neurological: She is alert and oriented to person, place, and time  Skin: Skin is warm and dry  No rash noted  Psychiatric: She has a normal mood and affect  Her behavior is normal    Vitals reviewed      Chavez De Paz MD, [facility]  1/31/2018  2:17 PM

## 2018-01-31 NOTE — ASSESSMENT & PLAN NOTE
-   Pre visit  at [facility] January 2018 was 1 units/milliliter  She has clinically no evidence of disease  She will return to the office in 3 months with pre visit   She will continue to have her Port-A-Cath flushed every 6-8 weeks  -   Of note, report some constipation and rectal pressure  Physical exam with no pelvic masses or compression  Will obtain screening colonoscopy  See below

## 2018-02-02 ENCOUNTER — HOSPITAL ENCOUNTER (OUTPATIENT)
Dept: INFUSION CENTER | Facility: CLINIC | Age: 73
Discharge: HOME | End: 2018-02-02
Payer: MEDICARE

## 2018-02-09 ENCOUNTER — HOSPITAL ENCOUNTER (OUTPATIENT)
Dept: INFUSION CENTER | Facility: CLINIC | Age: 73
Discharge: HOME | End: 2018-02-09
Payer: MEDICARE

## 2018-02-09 PROCEDURE — 96523 IRRIG DRUG DELIVERY DEVICE: CPT

## 2018-02-09 RX ADMIN — HEPARIN 300 UNITS: 100 SYRINGE at 15:35

## 2018-02-09 NOTE — PROGRESS NOTES
Pt to infusion center for port maintenance  Port accessed without issue +blood return flushed easily  Pt has f/u appt for next cath maintenance   Pt declined AVS

## 2018-02-13 ENCOUNTER — TRANSCRIBE ORDERS (OUTPATIENT)
Dept: LAB | Facility: CLINIC | Age: 73
End: 2018-02-13

## 2018-02-13 ENCOUNTER — HOSPITAL ENCOUNTER (OUTPATIENT)
Dept: MAMMOGRAPHY | Facility: CLINIC | Age: 73
Discharge: HOME | End: 2018-02-13
Payer: MEDICARE

## 2018-02-13 PROCEDURE — 77067 SCR MAMMO BI INCL CAD: CPT

## 2018-02-21 ENCOUNTER — OFFICE VISIT (OUTPATIENT)
Dept: SLEEP CENTER | Facility: CLINIC | Age: 73
End: 2018-02-21

## 2018-02-21 VITALS
BODY MASS INDEX: 36.75 KG/M2 | HEART RATE: 60 BPM | DIASTOLIC BLOOD PRESSURE: 78 MMHG | SYSTOLIC BLOOD PRESSURE: 120 MMHG | HEIGHT: 60 IN | WEIGHT: 187.2 LBS

## 2018-02-21 DIAGNOSIS — I10 ESSENTIAL HYPERTENSION: ICD-10-CM

## 2018-02-21 DIAGNOSIS — G47.33 OSA (OBSTRUCTIVE SLEEP APNEA): Primary | ICD-10-CM

## 2018-02-21 DIAGNOSIS — E66.9 OBESITY (BMI 30-39.9): ICD-10-CM

## 2018-02-21 DIAGNOSIS — R45.86 MOOD DISTURBANCE: ICD-10-CM

## 2018-02-21 DIAGNOSIS — G47.10 HYPERSOMNIA: ICD-10-CM

## 2018-02-21 DIAGNOSIS — J45.20 MILD INTERMITTENT ASTHMA WITHOUT COMPLICATION: ICD-10-CM

## 2018-02-21 DIAGNOSIS — G47.09 OTHER INSOMNIA: ICD-10-CM

## 2018-02-21 DIAGNOSIS — K21.9 CHRONIC GERD: ICD-10-CM

## 2018-02-21 DIAGNOSIS — G89.4 CHRONIC PAIN DISORDER: ICD-10-CM

## 2018-02-21 DIAGNOSIS — G47.30 SLEEP APNEA, UNSPECIFIED TYPE: ICD-10-CM

## 2018-02-21 DIAGNOSIS — J31.0 CHRONIC RHINITIS, UNSPECIFIED TYPE: ICD-10-CM

## 2018-02-21 RX ORDER — ATORVASTATIN CALCIUM 20 MG/1
20 TABLET, FILM COATED ORAL
COMMUNITY
End: 2020-05-12 | Stop reason: SDUPTHER

## 2018-02-21 RX ORDER — PAROXETINE 10 MG/1
10 TABLET, FILM COATED ORAL DAILY
Refills: 0 | COMMUNITY
Start: 2018-02-06

## 2018-02-21 NOTE — PROGRESS NOTES
Review of Systems      Genitourinary frequent urination and frequent urination at night   Cardiology palpitations/fluttering feeling in your chest   Gastrointestinal heartburn/acid reflux and abdominal pain disturbing sleep   Neurology headaches, muscle weakness, numbness/tingling of an extremity, decreased concentration, confusion, difficulity finding words and loss/change of vision   Constitutional weight change of 10 or more pounds in the past year gain of 20 pounds   Integumentary none   Psychiatry anxiety and depression   Musculoskeletal muscle tenderness/aching and back pain   Pulmonary shortness of breath, chest tightness and wheezing   ENT nasal or sinus congestion, post nasal drip and decreased hearing   Endocrine none   Hematological abnormal blood loss

## 2018-02-21 NOTE — PROGRESS NOTES
Consultation -  & [place]  72 y o  female  :1945  [facility]:[id number]    Physician Requesting Consult: Delia Duarte MD     Reason for Consult : [At your kind request] I saw this patient for initial sleep evaluation today  She was diagnosed with obstructive sleep apnea around 8 years ago and used CPAP until a few months ago when her machine broke  She is here because she needs replacement of her equipment  Her studies were done at [facility] and results are no longer available as they apparently went out of business  Medications, Past, Family and Social Histories were reviewed  has a current medication list which includes the following prescription(s): atenolol, atorvastatin, vitamin d3, paroxetine, polyethylene glycol, ranitidine, esomeprazole, oxycodone hcl, and trospium  HPI:  She reports recurrence of her snoring, awakens with choking and gasping  These episodes are associated with palpitations  She is not aware of modifying factors  She reported no restless leg symptoms or features of parasomnia  Sleep Routine:  She is spending approximately 8 hours in bed  She takes more than an hour to fall asleep (while watching TV)  Sleep is interrupted around 2 times a night and she has difficulty falling back asleep  She watches the clock worrying about sleep  She denied racing thoughts but has ongoing symptoms of depression  She also reports knee pain that disturbs sleep  She has excessive daytime drowsiness but denied napping  She rated herself at Total score: 10 /24 on the [place] Sleepiness Scale  Habits:  [ reports that she has never smoked  She has never used smokeless tobacco ], [ reports that she does not drink alcohol ], [ reports that she does not use drugs ], [she uses limited caffeine  She does not exercise]     12 point ROS: as attached reviewed      Family History:  [Negative for sleep disturbance ]    EXAM: Blood pressure 120/78, pulse 60, height 5' 0 25" (1 53 m), weight 84 9 kg (187 lb 3 2 oz), not currently breastfeeding  Body mass index is 36 26 kg/m²  This is a [well] groomed, [well] appearing female, in no distress  She is somewhat anxious  Mental state otherwise appears normal     Head and neck: Craniofacial anatomy [is normal]  Neck Circumference: 42 cm,      [appears thick] [& there is extra fatty tissue]  There [are no abnormal masses or] Lymhadenopathy  [  ]   Nasal airway: [patent]  Septum is [central ] Mucous membranes appeared hyperemic  Turbinates are hypertrophied There is clear rhinorrhea  Oral airway:  Base of tongue is at Modified Mallampati class II  Palate is redundant with AP narrowing  There is [no] tonsillar hypertrophy  Jeanenne Billy are normal]  Respiratory effort is normal  Air entry is [good] bilaterally  There are no wheezes or rales  [She has truncal obesity ]  The rest of her exam was unremarkable  IMPRESSION:     1  HERNÁN (obstructive sleep apnea)  CPAP Study    Diagnostic Sleep Study   2  Other insomnia     3  Hypersomnia     4  Obesity (BMI 30-39 9)     5  Essential hypertension     6  Mild intermittent asthma without complication     7  Chronic rhinitis, unspecified type     8  Chronic GERD     9  Mood disturbance ([facility] [address] )     10  Chronic pain disorder          PLAN:     1  With respect to above conditions, I counseled on pathophysiology, diagnosis, treatment options, risks and benefits; interrelationship and effects on symptoms and comorbidities; risks of no treatment; costs and insurance aspects  2  I advised on weight reduction, avoiding sleeping supine, using alcohol or sedating medications close to bed time and on safe driving practices  3  I did some  Cognitive behavioral therapy, advised on Sleep Hygiene and behavioral techniques to manage Insomnia  Specifically limiting her time in bed to 6 hours, avoiding watching TV in bed, starting an exercise routine and on relaxation techniques    4  Nocturnal polysomnography is indicated and a diagnostic study will be scheduled  5  Patient is willing to try Positive airway pressure therapy and will be scheduled for a titration study if indicated  6    She will also need to optimize treatment of her medical conditions, pain and Mood Disorder  6 Follow-up will be scheduled after the studies to review results, further details of treatment options and to initiate/adjust therapy  Thank you for allowing me to participate in the care of this patient  I will keep you apprised of developments      Sincerely,    Cooper Tamez MD  Board Certified Specialist

## 2018-02-26 DIAGNOSIS — R10.9 ABDOMINAL PAIN, UNSPECIFIED ABDOMINAL LOCATION: Primary | ICD-10-CM

## 2018-02-26 DIAGNOSIS — K59.00 CONSTIPATION, UNSPECIFIED CONSTIPATION TYPE: ICD-10-CM

## 2018-02-27 ENCOUNTER — APPOINTMENT (OUTPATIENT)
Dept: LAB | Facility: CLINIC | Age: 73
End: 2018-02-27
Payer: MEDICARE

## 2018-02-27 ENCOUNTER — OFFICE VISIT (OUTPATIENT)
Dept: GASTROENTEROLOGY | Facility: CLINIC | Age: 73
End: 2018-02-27
Payer: MEDICARE

## 2018-02-27 VITALS
WEIGHT: 189 LBS | DIASTOLIC BLOOD PRESSURE: 68 MMHG | HEIGHT: 60 IN | BODY MASS INDEX: 37.11 KG/M2 | SYSTOLIC BLOOD PRESSURE: 124 MMHG | HEART RATE: 80 BPM

## 2018-02-27 DIAGNOSIS — R10.30 LOWER ABDOMINAL PAIN: ICD-10-CM

## 2018-02-27 DIAGNOSIS — K75.9 HEPATITIS: ICD-10-CM

## 2018-02-27 DIAGNOSIS — R19.4 CHANGE IN BOWEL HABITS: ICD-10-CM

## 2018-02-27 DIAGNOSIS — K59.00 CONSTIPATION, UNSPECIFIED CONSTIPATION TYPE: ICD-10-CM

## 2018-02-27 DIAGNOSIS — K75.9 HEPATITIS: Primary | ICD-10-CM

## 2018-02-27 DIAGNOSIS — R19.7 DIARRHEA, UNSPECIFIED TYPE: ICD-10-CM

## 2018-02-27 LAB
ALBUMIN SERPL BCP-MCNC: 3.4 G/DL (ref 3.5–5)
ALP SERPL-CCNC: 113 U/L (ref 46–116)
ALT SERPL W P-5'-P-CCNC: 44 U/L (ref 12–78)
AST SERPL W P-5'-P-CCNC: 31 U/L (ref 5–45)
BILIRUB DIRECT SERPL-MCNC: 0.12 MG/DL (ref 0–0.2)
BILIRUB SERPL-MCNC: 0.42 MG/DL (ref 0.2–1)
PROT SERPL-MCNC: 7.1 G/DL (ref 6.4–8.2)

## 2018-02-27 PROCEDURE — 87340 HEPATITIS B SURFACE AG IA: CPT

## 2018-02-27 PROCEDURE — 99214 OFFICE O/P EST MOD 30 MIN: CPT | Performed by: INTERNAL MEDICINE

## 2018-02-27 PROCEDURE — 36415 COLL VENOUS BLD VENIPUNCTURE: CPT

## 2018-02-27 PROCEDURE — 86704 HEP B CORE ANTIBODY TOTAL: CPT

## 2018-02-27 PROCEDURE — 87521 HEPATITIS C PROBE&RVRS TRNSC: CPT

## 2018-02-27 PROCEDURE — 80076 HEPATIC FUNCTION PANEL: CPT

## 2018-02-27 NOTE — LETTER
February 27, 2018     Maud Carrel, [address]  [place] [address]    Patient: Kaleb Wetzel   YOB: 1945   Date of Visit: 2/27/2018       Dear Dr Berlin Duarte:    Thank you for referring Kaleb Wetzel to me for evaluation  Below are my notes for this consultation  If you have questions, please do not hesitate to call me  I look forward to following your patient along with you           Sincerely,        Bud Elaine MD        CC: No Recipients

## 2018-02-27 NOTE — PROGRESS NOTES
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  For the last 3-4 months she has been experiencing lower abdominal discomfort that radiates into or perhaps even from the low back  This occurs at about 4 or 5 o'clock in the morning  According to the patient if she urinates, the pain subsided significantly  The patient was seen by Urology who does not feel is urinary issue here  She has no rectal bleeding  Patient had been suffering from significant constipation for many years  She had taken MiraLax twice a day  She has a history of colonoscopic polypectomy bout 4 years ago but does not recall where this was done  According to the patient recently she had significant diarrhea for several days and now she is having a normal bowel movement twice a day, indicating a change in bowel habits  Again there is no melanotic stool, no hematochezia, no rectal bleeding  She has no tenesmus  She has no change in the caliber of the stool  She has no other lower GI symptomatology  She has had no dietary change  According  To the patient she had chemotherapy but no radiation therapy following her ovarian resection  The 2nd issue is hepatitis of some kind  The patient has been told in the past that she has hepatitis  She had blood transfusions many many years ago  According to the patient she does not recall being worked up for this  She has no history of intravenous or intranasal drug use  No alcohol use at all  No history of sexual exposure  No travel history  Patient denies any hepatitis or hepatitis prodrome that she is aware of  No yellow jaundice  No change in the color of urine or stool  No confusion  No alteration of the sleep-wake cycle  No other symptomatology referable to her liver  Review of Systems:   Constitutional: Negative for appetite change, fatigue, fever and unexpected weight change  HENT: Negative for nosebleeds, trouble swallowing  Eyes: Negative for pain and visual disturbance  Respiratory: Negative for cough, choking, chest tightness and shortness of breath  Cardiovascular: Negative for chest pain, palpitations and leg swelling  Gastrointestinal: Negative for abdominal distention, positive for abdominal pain, negative for nausea, vomiting, diarrhea, constipation, rectal pain, anal bleeding, and blood in stool  Endocrine: Negative for cold intolerance, heat intolerance, polydipsia, polyphagia and polyuria  Genitourinary: Negative for difficulty urinating, flank pain, frequency and hematuria  Musculoskeletal: Negative for arthralgias, back pain, joint swelling, myalgias, and neck pain  Skin: Negative for color change and rash  Allergic/Immunologic: Negative for immunocompromised state  Neurological: Negative for dizziness, tremors, seizures, syncope, speech difficulty, weakness, light-headedness and headaches  Hematological: Negative for adenopathy  Does not bruise/bleed easily  Psychiatric/Behavioral: Negative for confusion and dysphoric mood  The patient is not nervous/anxious         Historical Information   Past Medical History:   Diagnosis Date    Arthritis     Asthma     Edema     GERD (gastroesophageal reflux disease)     Hypertension     Infectious viral hepatitis     Lower back pain     Pelvic mass     Sleep apnea     uses cpap     Past Surgical History:   Procedure Laterality Date    APPENDECTOMY      CHOLECYSTECTOMY      DEBULKING TUMOR/ CYTOREDUCTION N/A 5/11/2017    Procedure: DEBULKING TUMOR/ CYTOREDUCTION: RADICAL ABDOMINAL HYSTERECTOMY, BSO, RADICAL RESECTION OF PELVIC TUMOR WITH PELVIC PERITONECTOMY, RESECTION OF RIGHT DIAPHRAGM TUMOR IMPLANT, OMENTECTOMY;  Surgeon: Lianet Cabrera MD;  [facility];  Service:     HYSTERECTOMY      PA LAP,DIAGNOSTIC ABDOMEN N/A 5/11/2017    Procedure: LAPAROSCOPY DIAGNOSTIC;  Surgeon: Lianet Cabrera MD;  [facility];  Service: Gynecology Oncology    PA PART REMOVAL COLON W ANASTOMOSIS N/A 5/11/2017    Procedure: Splenic flexure mobilization Low anterior resection with EEA 29 coloproctostomy low pelvic anastomosis to 8cm Intraop SPY Fluorescence angiography Intraop flexible sigmoidoscopy;  Surgeon: Karis Huang MD;  [facility];  Service: Colorectal    TONSILLECTOMY       Social History   History   Alcohol Use No     History   Drug Use No     History   Smoking Status    Never Smoker   Smokeless Tobacco    Never Used     No family history on file  Current Medications: has a current medication list which includes the following prescription(s): atenolol, atorvastatin, vitamin d3, esomeprazole, oxycodone hcl, paroxetine, polyethylene glycol, ranitidine, and trospium  Physical Exam:   Constitutional: Appears well developed, well nourished  Obese  Head: Normocephalic  Eyes: Conjunctivae and EOM are normal  Pupils are equal, round and reactive to light  ENT: Oropharynx is clear and moist    Neck: Normal range of motion  Neck supple  Cardiovascular: Normal rate, regular rhythm  Normal heart sounds and intact distal pulses  Pulmonary/Chest: Effort normal and breath sounds normal    Abdominal: Soft  Bowel sounds are normal  Abdomen is nontender  No succussion splash  No visceromegaly  Musculoskeletal: Normal range of motion  Neurological: Alert and oriented  No nystagmus or asterixis  Skin: Warm and dry     Psychiatric: Behavior normal  Thought content normal      Labs:   Results Reviewed     None          X-Rays & Procedures:   No orders to display ______________________________________________________________________      Assessment & Plan:      Diagnoses and all orders for this visit:    Hepatitis  -     Hepatitis B surface antigen; Future  -     Hepatitis B core antibody, total; Future  -     Hepatitis C Qualitative by PCR; Future  -     Hepatic function panel; Future    Constipation, unspecified constipation type  -     Ambulatory referral to Gastroenterology  Patient will be scheduled for colonoscopy  Lower abdominal pain  Plan, colonoscopy as above    Change in bowel habits  Plan, colonoscopy as above  Diarrhea, unspecified type  Plan, colonoscopy as above  Other orders  -     Diet NPO; Sips with meds; Standing  -     Void on call to OR; Standing  -     Insert peripheral IV; Standing        I will be happy to inform me of her progress and any further recommendations  I would like to thank you for allowing me to participate in her care              With warmest regards,    Oriana Fernandez MD, [facility]

## 2018-02-28 PROBLEM — R19.4 CHANGE IN BOWEL HABITS: Status: ACTIVE | Noted: 2018-02-28

## 2018-02-28 PROBLEM — R10.9 ABDOMINAL PAIN: Status: ACTIVE | Noted: 2018-02-28

## 2018-02-28 LAB
HBV CORE AB SER QL: NORMAL
HBV SURFACE AG SER QL: NORMAL

## 2018-03-02 LAB — HCV RNA SERPL QL NAA+PROBE: NEGATIVE

## 2018-03-05 ENCOUNTER — TELEPHONE (OUTPATIENT)
Dept: GASTROENTEROLOGY | Facility: CLINIC | Age: 73
End: 2018-03-05

## 2018-04-07 ENCOUNTER — HOSPITAL ENCOUNTER (INPATIENT)
Facility: HOSPITAL | Age: 73
LOS: 2 days | Discharge: HOME | DRG: 446 | End: 2018-04-09
Attending: EMERGENCY MEDICINE | Admitting: FAMILY MEDICINE
Payer: MEDICARE

## 2018-04-07 ENCOUNTER — APPOINTMENT (EMERGENCY)
Dept: MRI IMAGING | Facility: HOSPITAL | Age: 73
DRG: 446 | End: 2018-04-07
Payer: MEDICARE

## 2018-04-07 ENCOUNTER — APPOINTMENT (EMERGENCY)
Dept: CT IMAGING | Facility: HOSPITAL | Age: 73
DRG: 446 | End: 2018-04-07
Payer: MEDICARE

## 2018-04-07 DIAGNOSIS — R79.89 ELEVATED LFTS: ICD-10-CM

## 2018-04-07 DIAGNOSIS — R10.13 EPIGASTRIC ABDOMINAL PAIN: ICD-10-CM

## 2018-04-07 DIAGNOSIS — Z90.49 HISTORY OF CHOLECYSTECTOMY: ICD-10-CM

## 2018-04-07 DIAGNOSIS — K80.50 CHOLEDOCHOLITHIASIS: Primary | ICD-10-CM

## 2018-04-07 LAB
ALBUMIN SERPL BCP-MCNC: 3.2 G/DL (ref 3.5–5)
ALP SERPL-CCNC: 393 U/L (ref 46–116)
ALT SERPL W P-5'-P-CCNC: 771 U/L (ref 12–78)
ANION GAP SERPL CALCULATED.3IONS-SCNC: 7 MMOL/L (ref 4–13)
AST SERPL W P-5'-P-CCNC: 382 U/L (ref 5–45)
BASOPHILS # BLD AUTO: 0.02 THOUSANDS/ΜL (ref 0–0.1)
BASOPHILS NFR BLD AUTO: 0 % (ref 0–1)
BILIRUB SERPL-MCNC: 1.5 MG/DL (ref 0.2–1)
BILIRUB UR QL STRIP: NEGATIVE
BUN SERPL-MCNC: 15 MG/DL (ref 5–25)
CALCIUM SERPL-MCNC: 9.4 MG/DL (ref 8.3–10.1)
CHLORIDE SERPL-SCNC: 103 MMOL/L (ref 100–108)
CLARITY UR: CLEAR
CO2 SERPL-SCNC: 30 MMOL/L (ref 21–32)
COLOR UR: YELLOW
CREAT SERPL-MCNC: 0.76 MG/DL (ref 0.6–1.3)
EOSINOPHIL # BLD AUTO: 0.09 THOUSAND/ΜL (ref 0–0.61)
EOSINOPHIL NFR BLD AUTO: 1 % (ref 0–6)
ERYTHROCYTE [DISTWIDTH] IN BLOOD BY AUTOMATED COUNT: 15 % (ref 11.6–15.1)
GFR SERPL CREATININE-BSD FRML MDRD: 79 ML/MIN/1.73SQ M
GLUCOSE SERPL-MCNC: 108 MG/DL (ref 65–140)
GLUCOSE UR STRIP-MCNC: NEGATIVE MG/DL
HCT VFR BLD AUTO: 41 % (ref 34.8–46.1)
HGB BLD-MCNC: 13.3 G/DL (ref 11.5–15.4)
HGB UR QL STRIP.AUTO: NEGATIVE
KETONES UR STRIP-MCNC: NEGATIVE MG/DL
LEUKOCYTE ESTERASE UR QL STRIP: NEGATIVE
LIPASE SERPL-CCNC: 56 U/L (ref 73–393)
LYMPHOCYTES # BLD AUTO: 1.33 THOUSANDS/ΜL (ref 0.6–4.47)
LYMPHOCYTES NFR BLD AUTO: 21 % (ref 14–44)
MCH RBC QN AUTO: 29.6 PG (ref 26.8–34.3)
MCHC RBC AUTO-ENTMCNC: 32.4 G/DL (ref 31.4–37.4)
MCV RBC AUTO: 91 FL (ref 82–98)
MONOCYTES # BLD AUTO: 0.49 THOUSAND/ΜL (ref 0.17–1.22)
MONOCYTES NFR BLD AUTO: 8 % (ref 4–12)
NEUTROPHILS # BLD AUTO: 4.35 THOUSANDS/ΜL (ref 1.85–7.62)
NEUTS SEG NFR BLD AUTO: 69 % (ref 43–75)
NITRITE UR QL STRIP: NEGATIVE
NRBC BLD AUTO-RTO: 0 /100 WBCS
PH UR STRIP.AUTO: 7.5 [PH] (ref 4.5–8)
PLATELET # BLD AUTO: 143 THOUSANDS/UL (ref 149–390)
PMV BLD AUTO: 10.9 FL (ref 8.9–12.7)
POTASSIUM SERPL-SCNC: 3.6 MMOL/L (ref 3.5–5.3)
PROT SERPL-MCNC: 7.4 G/DL (ref 6.4–8.2)
PROT UR STRIP-MCNC: NEGATIVE MG/DL
RBC # BLD AUTO: 4.5 MILLION/UL (ref 3.81–5.12)
SODIUM SERPL-SCNC: 140 MMOL/L (ref 136–145)
SP GR UR STRIP.AUTO: 1.01 (ref 1–1.03)
TROPONIN I SERPL-MCNC: 0.02 NG/ML
UROBILINOGEN UR QL STRIP.AUTO: 0.2 E.U./DL
WBC # BLD AUTO: 6.29 THOUSAND/UL (ref 4.31–10.16)

## 2018-04-07 PROCEDURE — 93005 ELECTROCARDIOGRAM TRACING: CPT

## 2018-04-07 PROCEDURE — 36415 COLL VENOUS BLD VENIPUNCTURE: CPT

## 2018-04-07 PROCEDURE — 81003 URINALYSIS AUTO W/O SCOPE: CPT | Performed by: EMERGENCY MEDICINE

## 2018-04-07 PROCEDURE — 99285 EMERGENCY DEPT VISIT HI MDM: CPT

## 2018-04-07 PROCEDURE — 74181 MRI ABDOMEN W/O CONTRAST: CPT

## 2018-04-07 PROCEDURE — 83690 ASSAY OF LIPASE: CPT | Performed by: EMERGENCY MEDICINE

## 2018-04-07 PROCEDURE — 99222 1ST HOSP IP/OBS MODERATE 55: CPT | Performed by: FAMILY MEDICINE

## 2018-04-07 PROCEDURE — 85025 COMPLETE CBC W/AUTO DIFF WBC: CPT | Performed by: EMERGENCY MEDICINE

## 2018-04-07 PROCEDURE — 76376 3D RENDER W/INTRP POSTPROCES: CPT

## 2018-04-07 PROCEDURE — 80053 COMPREHEN METABOLIC PANEL: CPT | Performed by: EMERGENCY MEDICINE

## 2018-04-07 PROCEDURE — 74177 CT ABD & PELVIS W/CONTRAST: CPT

## 2018-04-07 PROCEDURE — 84484 ASSAY OF TROPONIN QUANT: CPT | Performed by: EMERGENCY MEDICINE

## 2018-04-07 RX ORDER — OXYCODONE HYDROCHLORIDE 5 MG/1
5 TABLET ORAL EVERY 4 HOURS PRN
Status: DISCONTINUED | OUTPATIENT
Start: 2018-04-07 | End: 2018-04-09 | Stop reason: HOSPADM

## 2018-04-07 RX ORDER — ATENOLOL 50 MG/1
50 TABLET ORAL DAILY
Status: DISCONTINUED | OUTPATIENT
Start: 2018-04-08 | End: 2018-04-09 | Stop reason: HOSPADM

## 2018-04-07 RX ORDER — PAROXETINE HYDROCHLORIDE 20 MG/1
10 TABLET, FILM COATED ORAL DAILY
Status: DISCONTINUED | OUTPATIENT
Start: 2018-04-08 | End: 2018-04-09 | Stop reason: HOSPADM

## 2018-04-07 RX ORDER — ONDANSETRON 2 MG/ML
4 INJECTION INTRAMUSCULAR; INTRAVENOUS EVERY 6 HOURS PRN
Status: DISCONTINUED | OUTPATIENT
Start: 2018-04-07 | End: 2018-04-09 | Stop reason: HOSPADM

## 2018-04-07 RX ORDER — ACETAMINOPHEN 325 MG/1
650 TABLET ORAL EVERY 6 HOURS PRN
Status: DISCONTINUED | OUTPATIENT
Start: 2018-04-07 | End: 2018-04-09 | Stop reason: HOSPADM

## 2018-04-07 RX ORDER — DOCUSATE SODIUM 100 MG/1
100 CAPSULE, LIQUID FILLED ORAL 2 TIMES DAILY
Status: DISCONTINUED | OUTPATIENT
Start: 2018-04-07 | End: 2018-04-09 | Stop reason: HOSPADM

## 2018-04-07 RX ORDER — PANTOPRAZOLE SODIUM 20 MG/1
20 TABLET, DELAYED RELEASE ORAL
Status: DISCONTINUED | OUTPATIENT
Start: 2018-04-08 | End: 2018-04-09 | Stop reason: HOSPADM

## 2018-04-07 RX ORDER — SODIUM CHLORIDE 9 MG/ML
50 INJECTION, SOLUTION INTRAVENOUS CONTINUOUS
Status: DISCONTINUED | OUTPATIENT
Start: 2018-04-07 | End: 2018-04-09 | Stop reason: HOSPADM

## 2018-04-07 RX ORDER — MORPHINE SULFATE 2 MG/ML
2 INJECTION, SOLUTION INTRAMUSCULAR; INTRAVENOUS ONCE
Status: DISCONTINUED | OUTPATIENT
Start: 2018-04-07 | End: 2018-04-07

## 2018-04-07 RX ORDER — ATORVASTATIN CALCIUM 20 MG/1
20 TABLET, FILM COATED ORAL
Status: DISCONTINUED | OUTPATIENT
Start: 2018-04-07 | End: 2018-04-09 | Stop reason: HOSPADM

## 2018-04-07 RX ORDER — ONDANSETRON 2 MG/ML
4 INJECTION INTRAMUSCULAR; INTRAVENOUS ONCE
Status: COMPLETED | OUTPATIENT
Start: 2018-04-07 | End: 2018-04-07

## 2018-04-07 RX ADMIN — DOCUSATE SODIUM 100 MG: 100 CAPSULE, LIQUID FILLED ORAL at 17:33

## 2018-04-07 RX ADMIN — ENOXAPARIN SODIUM 40 MG: 40 INJECTION SUBCUTANEOUS at 17:33

## 2018-04-07 RX ADMIN — HYDROMORPHONE HYDROCHLORIDE 0.2 MG: 1 INJECTION, SOLUTION INTRAMUSCULAR; INTRAVENOUS; SUBCUTANEOUS at 23:16

## 2018-04-07 RX ADMIN — SODIUM CHLORIDE 50 ML/HR: 0.9 INJECTION, SOLUTION INTRAVENOUS at 16:12

## 2018-04-07 RX ADMIN — ONDANSETRON 4 MG: 2 INJECTION INTRAMUSCULAR; INTRAVENOUS at 14:25

## 2018-04-07 RX ADMIN — IOHEXOL 100 ML: 350 INJECTION, SOLUTION INTRAVENOUS at 10:59

## 2018-04-07 RX ADMIN — ATORVASTATIN CALCIUM 20 MG: 20 TABLET, FILM COATED ORAL at 17:33

## 2018-04-07 RX ADMIN — HYDROMORPHONE HYDROCHLORIDE 0.5 MG: 1 INJECTION, SOLUTION INTRAMUSCULAR; INTRAVENOUS; SUBCUTANEOUS at 14:25

## 2018-04-07 NOTE — H&P
H&P- Nasir Seymour 1945, 72 y o  female MRN: [id number]    Unit/Bed#: -01 Encounter: [id number]    Primary Care Provider: Sara Samayoa MD   Date and time admitted to hospital: 4/7/2018  9:20 AM        Abdominal pain   Assessment & Plan    Colicky abdominal pain for about 1 month, CT and MRI/MRCP showing choledocholithiasis, she is status post cholecystectomy  · Admit patient, GI consult, clear liquid diet  · Pain control with home oxycodone or IV Dilaudid for severe pain  · Antiemetics as needed  · Plan for ERCP on Monday  · Patient is afebrile, no leukocytosis, hold on antibiotics for now  · History of the fallopian tube cancer, status post treatment  She has completed chemotherapy, last  within normal limits  Heparin flushes needed for port care        * Choledocholithiasis   Assessment & Plan    Plan as above        Essential hypertension   Assessment & Plan    Continue metoprolol, blood pressure is controlled        Obesity (BMI 30-39  9)   Assessment & Plan    Weight loss encouraged, nutrition consult          VTE Prophylaxis: Enoxaparin (Lovenox)  / sequential compression device   Code Status: FULL  POLST: POLST form is not discussed and not completed at this time  Discussion with family:  No family members present this time, patient declined to have me call anyone    Anticipated Length of Stay:  Patient will be admitted on an Inpatient basis with an anticipated length of stay of  > 2 midnights  Justification for Hospital Stay:  Choledocholithiasis, plan for ERCP    Total Time for Visit, including Counseling / Coordination of Care: 45 minutes  Greater than 50% of this total time spent on direct patient counseling and coordination of care  Chief Complaint:   Abdominal pain    History of Present Illness:    Nasir Seymour is a 72 y o  female who presents with 1 month of colicky abdominal pain   Patient has a past medical history which includes fallopian tube cancer status post chemotherapy which was completed in October 2017, cholecystectomy, obesity as well as hypertension and HERNÁN (CPAP broke)  Patient reports about 1 month of abdominal pain, in the epigastrium and radiating around to the back  It is not exacerbated by eating, but she feels it is worse when she goes to bed and will awake her in the middle of the night  Most recently she has had associated nausea/nonbloody emesis  Patient also has baseline constipation, which is improved since she started anticholinergic for bladder issues  Last bowel movement was within the past 1-2 days, baseline  She has previously seen Dr Zee Hutchins for GI and now sees Dr Mani Chung, was scheduled for colonoscopy in the next week or so  Review of Systems:    Review of Systems   Constitutional: Negative for chills, fever and unexpected weight change  Gastrointestinal: Positive for constipation, nausea and vomiting  All other systems reviewed and are negative        Past Medical and Surgical History:     Past Medical History:   Diagnosis Date    Arthritis     Asthma     Edema     GERD (gastroesophageal reflux disease)     Hypertension     Infectious viral hepatitis     Lower back pain     Pelvic mass     Sleep apnea     uses cpap       Past Surgical History:   Procedure Laterality Date    APPENDECTOMY      CHOLECYSTECTOMY      DEBULKING TUMOR/ CYTOREDUCTION N/A 5/11/2017    Procedure: DEBULKING TUMOR/ CYTOREDUCTION: RADICAL ABDOMINAL HYSTERECTOMY, BSO, RADICAL RESECTION OF PELVIC TUMOR WITH PELVIC PERITONECTOMY, RESECTION OF RIGHT DIAPHRAGM TUMOR IMPLANT, OMENTECTOMY;  Surgeon: Gabriel Lema MD;  [facility];  Service:     HYSTERECTOMY      PA LAP,DIAGNOSTIC ABDOMEN N/A 5/11/2017    Procedure: LAPAROSCOPY DIAGNOSTIC;  Surgeon: Gabriel Lema MD;  [facility];  Service: Gynecology Oncology    PA PART REMOVAL COLON W ANASTOMOSIS N/A 5/11/2017    Procedure: Splenic flexure mobilization Low anterior resection with EEA 29 coloproctostomy low pelvic anastomosis to 8cm Intraop SPY Fluorescence angiography Intraop flexible sigmoidoscopy;  Surgeon: Agueda Jasso MD;  [facility];  Service: Colorectal    TONSILLECTOMY         Meds/Allergies:    Prior to Admission medications    Medication Sig Start Date End Date Taking? Authorizing Provider   atenolol (TENORMIN) 50 mg tablet Take 50 mg by mouth daily   Yes Historical Provider, MD   atorvastatin (LIPITOR) 20 mg tablet Take 20 mg by mouth   Yes Historical Provider, MD   Cholecalciferol (VITAMIN D3) 2000 units TABS Take 1 tablet by mouth daily   Yes Historical Provider, MD   esomeprazole (NexIUM) 20 mg capsule Take 20 mg by mouth as needed   Yes Historical Provider, MD   OxyCODONE HCl (OXYCONTIN PO) Take 5 mg by mouth daily as needed     Yes Historical Provider, MD   PARoxetine (PAXIL) 10 mg tablet Take 10 mg by mouth daily 2/6/18  Yes Historical Provider, MD   ranitidine (ZANTAC) 150 MG capsule Take 150 mg by mouth as needed for indigestion or heartburn   Yes Historical Provider, MD   polyethylene glycol (MIRALAX) 17 g packet Take 17 g by mouth daily  4/7/18  Historical Provider, MD   trospium (SANCTURA XR) 60 mg 24 hr capsule daily 12/1/17 4/7/18  Historical Provider, MD     I have reviewed home medications with patient personally  Allergies:    Allergies   Allergen Reactions    Shellfish Allergy Other (See Comments)     Pt states that her lips swell with all shellfish except for shrimp    Morphine GI Intolerance    Oxycodone GI Intolerance     But patient still takes prn    Oxycodone-Acetaminophen GI Intolerance     Patient takes oxycodone daily prn for severe back pain    Tramadol GI Intolerance     Takes in small amounts       Social History:     Marital Status: Single   Occupation:   Patient Pre-hospital Living Situation:  Home  Patient Pre-hospital Level of Mobility:  Independent  Patient Pre-hospital Diet Restrictions: Regular  Substance Use History:   History   Alcohol Use No     History   Smoking Status    Never Smoker   Smokeless Tobacco    Never Used     History   Drug Use No       Family History:    Family History   Problem Relation Age of Onset    Breast cancer Sister     Heart failure Mother     Heart failure Father        Physical Exam:     Vitals:   Blood Pressure: 122/60 (04/07/18 1500)  Pulse: 62 (04/07/18 1500)  Temperature: 97 5 °F (36 4 °C) (04/07/18 1500)  Temp Source: Oral (04/07/18 1500)  Respirations: 20 (04/07/18 1500)  Height: 5' (152 4 cm) (04/07/18 1500)  Weight - Scale: 83 1 kg (183 lb 3 2 oz) (04/07/18 1500)  SpO2: 90 % (04/07/18 1500)    Physical Exam   Constitutional: She is oriented to person, place, and time  She appears well-developed and well-nourished  No distress  HENT:   Mouth/Throat: Oropharynx is clear and moist    Eyes: Conjunctivae are normal  Pupils are equal, round, and reactive to light  Neck: Neck supple  Carotid bruit is not present  No thyromegaly present  Cardiovascular: Normal rate, regular rhythm, S1 normal, S2 normal, normal heart sounds and intact distal pulses  Pulmonary/Chest: Effort normal and breath sounds normal  No respiratory distress  She has no wheezes  She has no rhonchi  She has no rales  Port in the right anterior chest wall, site clear   Abdominal: Soft  Normal appearance and bowel sounds are normal  She exhibits no distension  There is no tenderness  Musculoskeletal: She exhibits no edema  Lymphadenopathy:     She has no cervical adenopathy  Neurological: She is alert and oriented to person, place, and time  She has normal reflexes  No cranial nerve deficit  Skin: Skin is warm, dry and intact  Psychiatric: She has a normal mood and affect  Nursing note and vitals reviewed  Additional Data:     Lab Results: I have personally reviewed pertinent reports          Results from last 7 days  Lab Units 04/07/18  0932   WBC Thousand/uL 6 29 HEMOGLOBIN g/dL 13 3   HEMATOCRIT % 41 0   PLATELETS Thousands/uL 143*   NEUTROS PCT % 69   LYMPHS PCT % 21   MONOS PCT % 8   EOS PCT % 1       Results from last 7 days  Lab Units 04/07/18  0932   SODIUM mmol/L 140   POTASSIUM mmol/L 3 6   CHLORIDE mmol/L 103   CO2 mmol/L 30   BUN mg/dL 15   CREATININE mg/dL 0 76   CALCIUM mg/dL 9 4   TOTAL PROTEIN g/dL 7 4   BILIRUBIN TOTAL mg/dL 1 50*   ALK PHOS U/L 393*   ALT U/L 771*   AST U/L 382*   GLUCOSE RANDOM mg/dL 108           Imaging: I have personally reviewed pertinent reports  MRI abdomen wo contrast and mrcp   Final Result by Jenifer Ortiz MD (04/07 1317)      10 mm distal common bile duct filling defect consistent with choledocholithiasis  Associated biliary dilatation  Workstation performed: [id number]         CT abdomen pelvis with contrast   Final Result by Aline Mckay DO (04/07 1132)      Increasing intra- and extrahepatic biliary dilatation which may represent postcholecystectomy dilatation, however, should be correlated with any obstructive enzyme levels  If relevant, further evaluated with MRCP should be considered  No obstructive    calculus or discernible mass  Decreasing fatty liver changes  Small hepatic cyst       No focal inflammatory changes of the bowel  Stable vague density about the distal TRINI of uncertain although doubtful significance  Mild urinary bladder wall thickening may be related to underdistention  If there is concern for cystitis this can be correlated with urinalysis  Workstation performed: [id number]             EKG, Pathology, and Other Studies Reviewed on Admission:   · EKG: Not readily available for review at this time    [facility] Records Reviewed: Yes     ** Please Note: This note has been constructed using a voice recognition system   **

## 2018-04-07 NOTE — ASSESSMENT & PLAN NOTE
Colicky abdominal pain for about 1 month, CT and MRI/MRCP showing choledocholithiasis, she is status post cholecystectomy  · Admit patient, GI consult, clear liquid diet  · Pain control with home oxycodone or IV Dilaudid for severe pain  · Antiemetics as needed  · Plan for ERCP on Monday  · Patient is afebrile, no leukocytosis, hold on antibiotics for now  · History of the fallopian tube cancer, status post treatment  She has completed chemotherapy, last  within normal limits    Heparin flushes needed for port care

## 2018-04-07 NOTE — ED PROVIDER NOTES
History  Chief Complaint   Patient presents with    Flank Pain     patient presents to the ED with c/o b/l flank pain l6diict  patient also c/o n/v since thursday  HPI    Prior to Admission Medications   Prescriptions Last Dose Informant Patient Reported? Taking? Cholecalciferol (VITAMIN D3) 2000 units TABS  Self Yes Yes   Sig: Take 1 tablet by mouth daily   OxyCODONE HCl (OXYCONTIN PO)  Self Yes Yes   Sig: Take 5 mg by mouth daily as needed     PARoxetine (PAXIL) 10 mg tablet  Self Yes Yes   Sig: Take 10 mg by mouth daily   atenolol (TENORMIN) 50 mg tablet  Self Yes Yes   Sig: Take 50 mg by mouth daily   atorvastatin (LIPITOR) 20 mg tablet  Self Yes Yes   Sig: Take 20 mg by mouth   esomeprazole (NexIUM) 20 mg capsule  Self Yes Yes   Sig: Take 20 mg by mouth as needed   ranitidine (ZANTAC) 150 MG capsule  Self Yes Yes   Sig: Take 150 mg by mouth as needed for indigestion or heartburn      Facility-Administered Medications: None       Past Medical History:   Diagnosis Date    Arthritis     Asthma     Edema     GERD (gastroesophageal reflux disease)     Hypertension     Infectious viral hepatitis     Lower back pain     Pelvic mass     Sleep apnea     uses cpap       Past Surgical History:   Procedure Laterality Date    APPENDECTOMY      CHOLECYSTECTOMY      DEBULKING TUMOR/ CYTOREDUCTION N/A 5/11/2017    Procedure: DEBULKING TUMOR/ CYTOREDUCTION: RADICAL ABDOMINAL HYSTERECTOMY, BSO, RADICAL RESECTION OF PELVIC TUMOR WITH PELVIC PERITONECTOMY, RESECTION OF RIGHT DIAPHRAGM TUMOR IMPLANT, OMENTECTOMY;  Surgeon: Demetrius Mckinley MD;  [facility];  Service:     ERCP N/A 4/9/2018    Procedure: ENDOSCOPIC RETROGRADE CHOLANGIOPANCREATOGRAPHY (ERCP);   Surgeon: Alvarado Santamaria MD;  Location: [facility];  Service: Gastroenterology    HYSTERECTOMY      SC LAP,DIAGNOSTIC ABDOMEN N/A 5/11/2017    Procedure: LAPAROSCOPY DIAGNOSTIC;  Surgeon: Demetrius Mckinley MD;  [facility]; Service: Gynecology Oncology    SD PART REMOVAL COLON W ANASTOMOSIS N/A 5/11/2017    Procedure: Splenic flexure mobilization Low anterior resection with EEA 29 coloproctostomy low pelvic anastomosis to 8cm Intraop SPY Fluorescence angiography Intraop flexible sigmoidoscopy;  Surgeon: Delia Wells MD;  [facility];  Service: Colorectal    TONSILLECTOMY         Family History   Problem Relation Age of Onset    Breast cancer Sister     Heart failure Mother     Heart failure Father      I have reviewed and agree with the history as documented  Social History   Substance Use Topics    Smoking status: Never Smoker    Smokeless tobacco: Never Used    Alcohol use No        Review of Systems    Physical Exam  ED Triage Vitals   Temperature Pulse Respirations Blood Pressure SpO2   04/07/18 0920 04/07/18 0919 04/07/18 0919 04/07/18 0919 04/07/18 0919   97 9 °F (36 6 °C) 64 18 135/61 99 %      Temp Source Heart Rate Source Patient Position - Orthostatic VS BP Location FiO2 (%)   04/07/18 0920 04/07/18 1100 04/07/18 1100 04/07/18 1100 --   Oral Monitor Sitting Left arm       Pain Score       04/07/18 0919       8           Orthostatic Vital Signs  Vitals:    04/09/18 1200 04/09/18 1210 04/09/18 1252 04/09/18 1500   BP: 150/72 154/74 146/67 161/68   Pulse: 60 60 59 64   Patient Position - Orthostatic VS:   Lying Lying       Physical Exam   Constitutional: She is oriented to person, place, and time  She appears well-developed and well-nourished  No distress  HENT:   Head: Normocephalic and atraumatic  Mouth/Throat: Oropharynx is clear and moist    Eyes: Conjunctivae are normal  Pupils are equal, round, and reactive to light  Neck: Normal range of motion  No tracheal deviation present  Cardiovascular: Normal rate, regular rhythm, normal heart sounds and intact distal pulses  Pulmonary/Chest: Effort normal and breath sounds normal  No respiratory distress  Abdominal: Soft   Bowel sounds are normal  She exhibits no distension  There is generalized tenderness (Mild diffusely, worst epigastrium, right upper quadrant, entire right abdomen  )  There is no rigidity, no rebound, no guarding, no CVA tenderness, no tenderness at McBurney's point and negative Holliday's sign  Well-healed surgical incision   Musculoskeletal:        Lumbar back: She exhibits no tenderness and no bony tenderness  Neurological: She is alert and oriented to person, place, and time  She has normal strength  GCS eye subscore is 4  GCS verbal subscore is 5  GCS motor subscore is 6  Skin: Skin is warm and dry  Psychiatric: She has a normal mood and affect  Her behavior is normal    Nursing note and vitals reviewed  ED Medications  Medications   iohexol (OMNIPAQUE) 350 MG/ML injection (MULTI-DOSE) 100 mL (100 mL Intravenous Given 4/7/18 1059)   ondansetron (ZOFRAN) injection 4 mg (4 mg Intravenous Given 4/7/18 1425)   HYDROmorphone (DILAUDID) injection 0 5 mg (0 5 mg Intravenous Given 4/7/18 1425)       Diagnostic Studies  Results Reviewed     Procedure Component Value Units Date/Time    Troponin I [[id number]]  (Normal) Collected:  04/07/18 1036    Lab Status:  Final result Specimen:  Blood from Arm, Right Updated:  04/07/18 1104     Troponin I 0 02 ng/mL     Narrative:         [employer] Chemistry analyzer 99% cutoff is > 0 04 ng/mL in network labs    o cTnI 99% cutoff is useful only when applied to patients in the clinical setting of myocardial ischemia  o cTnI 99% cutoff should be interpreted in the context of clinical history, ECG findings and possibly cardiac imaging to establish correct diagnosis  o cTnI 99% cutoff may be suggestive but clearly not indicative of a coronary event without the clinical setting of myocardial ischemia      UA w Reflex to Microscopic w Reflex to Culture [[id number]]  (Normal) Collected:  04/07/18 1048    Lab Status:  Final result Specimen:  Urine from Urine, Clean Catch Updated:  04/07/18 1100 Color, UA Yellow     Clarity, UA Clear     Specific Gravity, UA 1 010     pH, UA 7 5     Leukocytes, UA Negative     Nitrite, UA Negative     Protein, UA Negative mg/dl      Glucose, UA Negative mg/dl      Ketones, UA Negative mg/dl      Urobilinogen, UA 0 2 E U /dl      Bilirubin, UA Negative     Blood, UA Negative    Comprehensive metabolic panel [[id number]]  (Abnormal) Collected:  04/07/18 0932    Lab Status:  Final result Specimen:  Blood from Arm, Right Updated:  04/07/18 0955     Sodium 140 mmol/L      Potassium 3 6 mmol/L      Chloride 103 mmol/L      CO2 30 mmol/L      Anion Gap 7 mmol/L      BUN 15 mg/dL      Creatinine 0 76 mg/dL      Glucose 108 mg/dL      Calcium 9 4 mg/dL       (H) U/L       (H) U/L      Alkaline Phosphatase 393 (H) U/L      Total Protein 7 4 g/dL      Albumin 3 2 (L) g/dL      Total Bilirubin 1 50 (H) mg/dL      eGFR 79 ml/min/1 73sq m     Narrative:         National Kidney Disease Education Program recommendations are as follows:  GFR calculation is accurate only with a steady state creatinine  Chronic Kidney disease less than 60 ml/min/1 73 sq  meters  Kidney failure less than 15 ml/min/1 73 sq  meters      Lipase [[id number]]  (Abnormal) Collected:  04/07/18 0932    Lab Status:  Final result Specimen:  Blood from Arm, Right Updated:  04/07/18 0955     Lipase 56 (L) u/L     CBC and differential [[id number]]  (Abnormal) Collected:  04/07/18 0932    Lab Status:  Final result Specimen:  Blood from Arm, Right Updated:  04/07/18 0940     WBC 6 29 Thousand/uL      RBC 4 50 Million/uL      Hemoglobin 13 3 g/dL      Hematocrit 41 0 %      MCV 91 fL      MCH 29 6 pg      MCHC 32 4 g/dL      RDW 15 0 %      MPV 10 9 fL      Platelets 136 (L) Thousands/uL      nRBC 0 /100 WBCs      Neutrophils Relative 69 %      Lymphocytes Relative 21 %      Monocytes Relative 8 %      Eosinophils Relative 1 %      Basophils Relative 0 %      Neutrophils Absolute 4 35 Thousands/µL      Lymphocytes Absolute 1 33 Thousands/µL      Monocytes Absolute 0 49 Thousand/µL      Eosinophils Absolute 0 09 Thousand/µL      Basophils Absolute 0 02 Thousands/µL                  FL ERCP biliary only   Final Result by Ike Pizarro MD (04/09 1322)   Dilated the common bile duct with choledocholithiasis status post removal   Pneumobilia seen on the last images            Workstation performed: [id number]         MRI abdomen wo contrast and mrcp   Final Result by Jose Arredondo MD (04/07 1317)      10 mm distal common bile duct filling defect consistent with choledocholithiasis  Associated biliary dilatation  Workstation performed: [id number]         CT abdomen pelvis with contrast   Final Result by Mary Jiang DO (04/07 1132)      Increasing intra- and extrahepatic biliary dilatation which may represent postcholecystectomy dilatation, however, should be correlated with any obstructive enzyme levels  If relevant, further evaluated with MRCP should be considered  No obstructive    calculus or discernible mass  Decreasing fatty liver changes  Small hepatic cyst       No focal inflammatory changes of the bowel  Stable vague density about the distal TRINI of uncertain although doubtful significance  Mild urinary bladder wall thickening may be related to underdistention  If there is concern for cystitis this can be correlated with urinalysis        Workstation performed: [id number]                    Procedures  ECG 12 Lead Documentation  Date/Time: 4/7/2018 10:57 AM  Performed by: Darylene Gubler  Authorized by: Darylene Gubler     Indications / Diagnosis:  Epigastric abd pain  ECG reviewed by me, the ED Provider: yes    Patient location:  ED  Previous ECG:     Previous ECG:  Compared to current    Comparison ECG info:  03/02/17    Similarity:  Changes noted (TWI instead of flat)  Interpretation:     Interpretation: abnormal    Rate:     ECG rate:  62    ECG rate assessment: normal    Rhythm:     Rhythm: sinus rhythm    Ectopy:     Ectopy: none    QRS:     QRS axis:  Normal    QRS intervals: Wide  Conduction:     Conduction: abnormal      Abnormal conduction: non-specific intraventricular conduction delay    ST segments:     ST segments:  Normal  T waves:     T waves: non-specific, flattening and inverted      Flattening:  V6    Inverted:  III, aVF, V4, V5 and V3           Phone Contacts  ED Phone Contact    ED Course  ED Course                                MDM  Number of Diagnoses or Management Options  Choledocholithiasis: new and requires workup  Elevated LFTs: new and requires workup  Epigastric abdominal pain: new and requires workup  History of cholecystectomy: new and requires workup  Diagnosis management comments: This is a 51-year-old female who presents here today with worsening abdominal pain  She states she has been having pain for at least a month, primarily across her upper abdomen but somewhat diffusely, and radiating into her back, primarily lower  She does have mild pain sometimes during the day, but her pain is worst overnight, and early in the morning, between three and 0600 hours, and will frequently wake her from sleep  She states on 04/06 she developed nausea, vomiting, diarrhea, with about 3-4 episodes of vomiting, and about 4-5 episodes of diarrhea  The pain improved, and her nausea, vomiting, and diarrhea resolved yesterday  Today, the pain worsened again, and she has nausea but no vomiting or diarrhea  She does have improvement of her pain now compared to what it was earlier this morning  She has no fevers, URI symptoms  She has taken Mylanta on a couple of occasions with some improvement of her symptoms  She does note some orange-colored urine yesterday, but no dysuria, frequency, hematuria, other urinary symptoms  She states that sometimes over the past month walking around has made her pain worse, as well as taking deep breaths    She denies any relation to food or positions  She denies any shortness of breath or dyspnea on exertion  She states she has never had similar pain to this prior to the last month, and does states she did have her gallbladder out  She had surgery last May, due to the fallopian tube and ovarian cancers, with surgical resection, and she has finished her chemotherapy with no known recurrence of disease  She states she has recently seen her PCP for her pain, and nothing was done for this  She was seen by GI back in February for several months worth of abdominal pain, though per the note she was sitting was primarily lower abdominal pain radiating into the lower back  She was scheduled for a colonoscopy, but this was not done  She did have viral hepatitis panel done at that time, which was normal  Last March she had significantly elevated LFTs, with AST/ALT being 1478/1139, with bilirubin of 2 0 an alk phos of 358  She was seen on 3/3/17 for upper mid abdominal pain, and at that time told him she had had prior similar pain with "inflammation of the pancreas "  She had markedly elevated LFTs at that time, with a normal lipase, and CT scan concerning for her gynecologic malignancy, and symptom approval with GI cocktail  She did have follow-up with GI on 04/10 with additional lab work done, and had gradual improvement of her LFTs over time without obvious identifiable etiology  ROS: Otherwise negative, unless stated as above  She is well appearing, in no acute distress  She does have diffuse abdominal tenderness, worst in the upper and right abdomen, without peritoneal signs  This is most likely recurrent abdominal pain of uncertain etiology, as per her prior episodes  However, as she has had prior problems with acute hepatitis this could be recurrent  She also has a complex surgical history, so could have complications related to this, including SBO   With her pain into her chest and age, this could be referred pain from ACS  We will check lab work and a CT scan to evaluate  Her lab work is remarkable for elevated LFTs, primarily AST/ALT over bilirubin  I spoke with Dr Regina Villagran, who is on call for GI  He recommends trying to obtain an MRCP to evaluate for possible retained distal CBD stone contributing to her recurrent symptoms and elevated LFTs  Other possibilities include Sphincter of Oddi dysfunction, especially in setting of prior negative workup  If the MRCP is negative, she needs to follow up with her GI doctor as an outpatient  The MRCP was obtained, and does show a retained stone, consistent with choledocolithiasis  After further discussion with GI, we will admit her to [facility] for monitoring, GI evaluation, and ERCP on 4/09  I updated the patient on findings and plan of care, and she expresses understanding with this plan         Amount and/or Complexity of Data Reviewed  Clinical lab tests: ordered and reviewed  Tests in the radiology section of CPT®: ordered and reviewed  Decide to obtain previous medical records or to obtain history from someone other than the patient: yes  Review and summarize past medical records: yes  Discuss the patient with other providers: yes  Independent visualization of images, tracings, or specimens: yes      CritCare Time    Disposition  Final diagnoses:   Choledocholithiasis   Elevated LFTs   Epigastric abdominal pain   History of cholecystectomy     Time reflects when diagnosis was documented in both MDM as applicable and the Disposition within this note     Time User Action Codes Description Comment    4/7/2018  1:39 PM Nik Ambriz Add [K80 50] Choledocholithiasis     4/7/2018  1:40 PM Nik Ambriz Add [R79 89] Elevated LFTs     4/7/2018  1:40 PM Nik Ambriz Add [R10 13] Epigastric abdominal pain     4/7/2018  1:40 PM Nik Ambriz [Z90 49] History of cholecystectomy       ED Disposition     ED Disposition Condition Comment    Admit  Case was discussed with Dr Hortensia Saunders and the patient's admission status was agreed to be Admission Status: inpatient status to the service of Dr Hortensia Saunders          Follow-up Information     Follow up With Specialties Details Why Contact Info    Lynne Gerard PA-C Gastroenterology, Physician Assistant Follow up Please call to schedule a follow-up at [facility] office [address] [facility]  [address]  [address] [facility] [address] [place] [address]      Maddie Moura MD Family Medicine Follow up Please schedule a hospital follow-up [address] [facility] Male [address] [place] [id number] [phone]          Discharge Medication List as of 4/9/2018  4:54 PM      CONTINUE these medications which have NOT CHANGED    Details   atenolol (TENORMIN) 50 mg tablet Take 50 mg by mouth daily, Historical Med      atorvastatin (LIPITOR) 20 mg tablet Take 20 mg by mouth, Historical Med      Cholecalciferol (VITAMIN D3) 2000 units TABS Take 1 tablet by mouth daily, Historical Med      esomeprazole (NexIUM) 20 mg capsule Take 20 mg by mouth as needed, Historical Med      OxyCODONE HCl (OXYCONTIN PO) Take 5 mg by mouth daily as needed  , Historical Med      PARoxetine (PAXIL) 10 mg tablet Take 10 mg by mouth daily, Starting Tue 2/6/2018, Historical Med      ranitidine (ZANTAC) 150 MG capsule Take 150 mg by mouth as needed for indigestion or heartburn, Historical Med             Outpatient Discharge Orders  Discharge Diet     Activity as tolerated         ED Provider  Electronically Signed by           Venice Hollins MD  04/11/18 1147

## 2018-04-08 PROBLEM — R79.89 ELEVATED LFTS: Status: ACTIVE | Noted: 2018-04-07

## 2018-04-08 LAB
ALBUMIN SERPL BCP-MCNC: 2.6 G/DL (ref 3.5–5)
ALP SERPL-CCNC: 337 U/L (ref 46–116)
ALT SERPL W P-5'-P-CCNC: 529 U/L (ref 12–78)
ANION GAP SERPL CALCULATED.3IONS-SCNC: 5 MMOL/L (ref 4–13)
AST SERPL W P-5'-P-CCNC: 218 U/L (ref 5–45)
BILIRUB SERPL-MCNC: 1 MG/DL (ref 0.2–1)
BUN SERPL-MCNC: 14 MG/DL (ref 5–25)
CALCIUM SERPL-MCNC: 8.9 MG/DL (ref 8.3–10.1)
CHLORIDE SERPL-SCNC: 105 MMOL/L (ref 100–108)
CO2 SERPL-SCNC: 29 MMOL/L (ref 21–32)
CREAT SERPL-MCNC: 0.65 MG/DL (ref 0.6–1.3)
ERYTHROCYTE [DISTWIDTH] IN BLOOD BY AUTOMATED COUNT: 14.9 % (ref 11.6–15.1)
GFR SERPL CREATININE-BSD FRML MDRD: 90 ML/MIN/1.73SQ M
GLUCOSE SERPL-MCNC: 113 MG/DL (ref 65–140)
HCT VFR BLD AUTO: 37.1 % (ref 34.8–46.1)
HGB BLD-MCNC: 11.9 G/DL (ref 11.5–15.4)
MCH RBC QN AUTO: 29.8 PG (ref 26.8–34.3)
MCHC RBC AUTO-ENTMCNC: 32.1 G/DL (ref 31.4–37.4)
MCV RBC AUTO: 93 FL (ref 82–98)
PLATELET # BLD AUTO: 131 THOUSANDS/UL (ref 149–390)
PMV BLD AUTO: 11.5 FL (ref 8.9–12.7)
POTASSIUM SERPL-SCNC: 4 MMOL/L (ref 3.5–5.3)
PROT SERPL-MCNC: 6.3 G/DL (ref 6.4–8.2)
RBC # BLD AUTO: 3.99 MILLION/UL (ref 3.81–5.12)
SODIUM SERPL-SCNC: 139 MMOL/L (ref 136–145)
WBC # BLD AUTO: 5.11 THOUSAND/UL (ref 4.31–10.16)

## 2018-04-08 PROCEDURE — 85027 COMPLETE CBC AUTOMATED: CPT | Performed by: PHYSICIAN ASSISTANT

## 2018-04-08 PROCEDURE — 99222 1ST HOSP IP/OBS MODERATE 55: CPT | Performed by: INTERNAL MEDICINE

## 2018-04-08 PROCEDURE — 99232 SBSQ HOSP IP/OBS MODERATE 35: CPT | Performed by: PHYSICIAN ASSISTANT

## 2018-04-08 PROCEDURE — 80053 COMPREHEN METABOLIC PANEL: CPT | Performed by: PHYSICIAN ASSISTANT

## 2018-04-08 RX ADMIN — PAROXETINE HYDROCHLORIDE 10 MG: 20 TABLET, FILM COATED ORAL at 11:13

## 2018-04-08 RX ADMIN — ATENOLOL 50 MG: 50 TABLET ORAL at 11:12

## 2018-04-08 RX ADMIN — HYDROMORPHONE HYDROCHLORIDE 0.2 MG: 1 INJECTION, SOLUTION INTRAMUSCULAR; INTRAVENOUS; SUBCUTANEOUS at 05:10

## 2018-04-08 RX ADMIN — ONDANSETRON 4 MG: 2 INJECTION INTRAMUSCULAR; INTRAVENOUS at 02:28

## 2018-04-08 RX ADMIN — ATORVASTATIN CALCIUM 20 MG: 20 TABLET, FILM COATED ORAL at 17:46

## 2018-04-08 RX ADMIN — SODIUM CHLORIDE 50 ML/HR: 0.9 INJECTION, SOLUTION INTRAVENOUS at 11:09

## 2018-04-08 RX ADMIN — ENOXAPARIN SODIUM 40 MG: 40 INJECTION SUBCUTANEOUS at 11:12

## 2018-04-08 RX ADMIN — ACETAMINOPHEN 650 MG: 325 TABLET, FILM COATED ORAL at 02:27

## 2018-04-08 RX ADMIN — DOCUSATE SODIUM 100 MG: 100 CAPSULE, LIQUID FILLED ORAL at 17:46

## 2018-04-08 RX ADMIN — PANTOPRAZOLE SODIUM 20 MG: 20 TABLET, DELAYED RELEASE ORAL at 05:10

## 2018-04-08 RX ADMIN — DOCUSATE SODIUM 100 MG: 100 CAPSULE, LIQUID FILLED ORAL at 11:15

## 2018-04-08 NOTE — PROGRESS NOTES
Progress Note - Marcella Becerra 1945, 72 y o  female MRN: [id number]    Unit/Bed#: -[facility] Encounter: [id number]    Primary Care Provider: Mahogany Hernandez MD   Date and time admitted to hospital: 4/7/2018  9:20 AM    Discussed with nursing, okay to hold IV fluids the through this evening as patient is urinating frequently well on them, restart this evening once made NPO again  Abdominal pain   Assessment & Plan    Colicky abdominal pain for about 1 month, CT and MRI/MRCP showing choledocholithiasis, she is status post cholecystectomy  · Appreciate GI consult, NPO after midnight  · Pain control with home oxycodone for moderate pain or IV Dilaudid for severe pain  · Antiemetics as needed  · Plan for ERCP on Monday 4/9  · Patient is afebrile, no leukocytosis  · History of the fallopian tube cancer, status post treatment  She has completed chemotherapy, last  within normal limits  Heparin flushes needed for port care        * Choledocholithiasis   Assessment & Plan    Plan as above        HERNÁN (obstructive sleep apnea)   Assessment & Plan    CPAP at night        Essential hypertension   Assessment & Plan    Continue metoprolol, blood pressure is controlled        Obesity (BMI 30-39  9)   Assessment & Plan    Weight loss encouraged, nutrition consult        Elevated LFTs   Assessment & Plan    Secondary to choledocholithiasis, improved today, per GI possibility that the stone is passed, still plan for ERCP tomorrow          VTE Pharmacologic Prophylaxis:   Pharmacologic: Enoxaparin (Lovenox)  Mechanical VTE Prophylaxis in Place: Yes    Patient Centered Rounds: I have performed bedside rounds with nursing staff today  Discussions with Specialists or Other Care Team Provider: GI note reviewed     Education and Discussions with Family / Patient:  Discussed with the patient regarding the plan for the ERCP tomorrow    Time Spent for Care: 20 minutes    More than 50% of total time spent on counseling and coordination of care as described above  Current Length of Stay: 1 day(s)    Current Patient Status: Inpatient   Certification Statement: The patient will continue to require additional inpatient hospital stay due to ERCP tomorrow for choledocholithiasis    Discharge Plan:  Pending ERCP, and GI clearance - likely tomorrow after ERCP or Tuesday morning    Code Status: Level 1 - Full Code      Subjective:   Patient seen this morning, she had an episode of severe pain again overnight  She has been doing incentive spirometer  Pain is now pretty much resolved, she is tolerating diet  She is hydrating herself well  She is urinating frequently while on the IV fluids  Passing flatus  Denies subjective fevers or chills  She can't wait to go home  Objective:     Vitals:   Temp (24hrs), Av 7 °F (36 5 °C), Min:97 4 °F (36 3 °C), Max:97 9 °F (36 6 °C)    HR:  [58-67] 58  Resp:  [18] 18  BP: (114-130)/(56-58) 118/56  SpO2:  [92 %-93 %] 93 %  Body mass index is 35 78 kg/m²  Input and Output Summary (last 24 hours): Intake/Output Summary (Last 24 hours) at 18 1552  Last data filed at 18 1549   Gross per 24 hour   Intake          1827 83 ml   Output                0 ml   Net          1827 83 ml       Physical Exam:     Physical Exam   Constitutional: She is oriented to person, place, and time  She appears well-developed and well-nourished  No distress  HENT:   Mouth/Throat: Oropharynx is clear and moist    Cardiovascular: Normal rate, regular rhythm, S1 normal, S2 normal, normal heart sounds and intact distal pulses  No murmur heard  Pulmonary/Chest: Effort normal and breath sounds normal  No respiratory distress  She has no wheezes  She has no rales  Abdominal: Soft  Bowel sounds are normal  She exhibits no distension  There is no tenderness  Obese   Musculoskeletal: She exhibits no edema  Neurological: She is alert and oriented to person, place, and time     Nursing note and vitals reviewed  Additional Data:     Labs:      Results from last 7 days  Lab Units 04/08/18  0506 04/07/18  0932   WBC Thousand/uL 5 11 6 29   HEMOGLOBIN g/dL 11 9 13 3   HEMATOCRIT % 37 1 41 0   PLATELETS Thousands/uL 131* 143*   NEUTROS PCT %  --  69   LYMPHS PCT %  --  21   MONOS PCT %  --  8   EOS PCT %  --  1       Results from last 7 days  Lab Units 04/08/18  0506   SODIUM mmol/L 139   POTASSIUM mmol/L 4 0   CHLORIDE mmol/L 105   CO2 mmol/L 29   BUN mg/dL 14   CREATININE mg/dL 0 65   CALCIUM mg/dL 8 9   TOTAL PROTEIN g/dL 6 3*   BILIRUBIN TOTAL mg/dL 1 00   ALK PHOS U/L 337*   ALT U/L 529*   AST U/L 218*   GLUCOSE RANDOM mg/dL 113           * I Have Reviewed All Lab Data Listed Above  * Additional Pertinent Lab Tests Reviewed:  All Labs Within Last 24 Hours Reviewed    Imaging:    Imaging Reports Reviewed Today Include:  None new  Imaging Personally Reviewed by Myself Includes:  None new     Recent Cultures (last 7 days):           Last 24 Hours Medication List:     Current Facility-Administered Medications:  acetaminophen 650 mg Oral Q6H PRN Donta Hernandez PA-C    atenolol 50 mg Oral Daily Nohelia Guerra, PA-ODALYS    atorvastatin 20 mg Oral Daily With [employer], PA-ODALYS    docusate sodium 100 mg Oral BID Nohelia Guerra, PA-ODALYS    enoxaparin 40 mg Subcutaneous Daily Nohelia Guerra, PA-ODALYS    heparin lock flush 300 Units Intracatheter Q1H PRN Donta Hernandez PA-C    HYDROmorphone 0 2 mg Intravenous Q4H PRN Nohelia Guerra, PA-ODALYS    ondansetron 4 mg Intravenous Q6H PRN Nohelia Guerra, PA-ODALYS    oxyCODONE 5 mg Oral Q4H PRN Nohelia Guerra, PA-ODALYS    pantoprazole 20 mg Oral Early Morning Nohelia Guerra, PA-ODALYS    PARoxetine 10 mg Oral Daily Nohelia Guerra, PA-ODALYS    sodium chloride 50 mL/hr Intravenous Continuous Donta Hernandez PA-C Last Rate: Stopped (04/08/18 1300)        Today, Patient Was Seen By: Donta Hernandez PA-C    ** Please Note: Dictation voice to text software may have been used in the creation of this document   **

## 2018-04-08 NOTE — ASSESSMENT & PLAN NOTE
Secondary to choledocholithiasis, improved today, per GI possibility that the stone is passed, still plan for ERCP tomorrow

## 2018-04-08 NOTE — CONSULTS
Consultation - [facility]  Kevin Jordan 72 y o  female MRN: [id number]  Unit/Bed#: -01 Encounter: [id number]        Inpatient consult to gastroenterology  Consult performed by: William Blakely ordered by: Janet Bolden          Reason for Consult / Principal Problem:   1  Elevated LFTs in setting of CBD dilation and choledocholithiasis- differential diagnosis include distal CBD stones  Of note hepatitis C RNA, hepatitis-B core antibody, hepatitis-B surface antigen were all negative  Fortunately LFTs are trending downward and abdominal pain is improved  LFTs today are ALT of 529, alk-phos of 337, AST of 218  Bilirubin is normal at 1  MRI does show filling defect with a 10 mm distal common bile duct stone  ERCP plan tomorrow  Discussed plan with the patient and family  ______________________________________________________________________    HPI:    Kevin Jordan is a very pleasant 46-year-old female with previous history fallopian tube cancer status post chemotherapy, history of cholecystectomy, history of hypertension, history of obstructive sleep apnea history of colicky abdominal pain in the epigastric region, history of constipation presents for evaluation of worsening abdominal pain identified to have AST of 382, , bili of 1 5 in setting of MRCP which showed choledocholithiasis  Abdominal pain is improved today in T bili trending downward  But did have severe pain last night  This may indicate she may have passed a stone but she is at increased risk of developing future choledocholithiasis  ERCP is planned tomorrow  She also recently saw Dr Sana Martínez and Dr Williams [place] with colonoscopy which was planned as an outpatient for further evaluation of lower abdominal pain  Denies any anti-platelet therapy, or anticoagulation use  REVIEW OF SYSTEMS:    CONSTITUTIONAL: Denies any fever, chills, rigors, and weight loss  HEENT: No earache or tinnitus   Denies hearing loss or visual disturbances  CARDIOVASCULAR: No chest pain or palpitations  RESPIRATORY: Denies any cough, hemoptysis, shortness of breath or dyspnea on exertion  GASTROINTESTINAL: As noted in the History of Present Illness  GENITOURINARY:  History of fallopian tube cancer  NEUROLOGIC: No dizziness or vertigo, denies headaches  MUSCULOSKELETAL: Denies any muscle or joint pain  SKIN: Denies skin rashes or itching  ENDOCRINE: Denies excessive thirst  Denies intolerance to heat or cold  PSYCHOSOCIAL: Denies depression or anxiety  Denies any recent memory loss         Historical Information   Past Medical History:   Diagnosis Date    Arthritis     Asthma     Edema     GERD (gastroesophageal reflux disease)     Hypertension     Infectious viral hepatitis     Lower back pain     Pelvic mass     Sleep apnea     uses cpap     Past Surgical History:   Procedure Laterality Date    APPENDECTOMY      CHOLECYSTECTOMY      DEBULKING TUMOR/ CYTOREDUCTION N/A 5/11/2017    Procedure: DEBULKING TUMOR/ CYTOREDUCTION: RADICAL ABDOMINAL HYSTERECTOMY, BSO, RADICAL RESECTION OF PELVIC TUMOR WITH PELVIC PERITONECTOMY, RESECTION OF RIGHT DIAPHRAGM TUMOR IMPLANT, OMENTECTOMY;  Surgeon: Lee Fowler MD;  Location: [facility];  Service:     HYSTERECTOMY      NH LAP,DIAGNOSTIC ABDOMEN N/A 5/11/2017    Procedure: LAPAROSCOPY DIAGNOSTIC;  Surgeon: Lee Fowler MD;  [facility];  Service: Gynecology Oncology    NH PART REMOVAL COLON W ANASTOMOSIS N/A 5/11/2017    Procedure: Splenic flexure mobilization Low anterior resection with EEA 29 coloproctostomy low pelvic anastomosis to 8cm Intraop SPY Fluorescence angiography Intraop flexible sigmoidoscopy;  Surgeon: Ally Barreto MD;  Location: [facility];  Service: Colorectal    TONSILLECTOMY       Social History   History   Alcohol Use No     History   Drug Use No     History   Smoking Status    Never Smoker   Smokeless Tobacco    Never Used     Family History   Problem Relation Age of Onset    Breast cancer Sister     Heart failure Mother     Heart failure Father        Meds/Allergies     Prescriptions Prior to Admission   Medication    atenolol (TENORMIN) 50 mg tablet    atorvastatin (LIPITOR) 20 mg tablet    Cholecalciferol (VITAMIN D3) 2000 units TABS    esomeprazole (NexIUM) 20 mg capsule    OxyCODONE HCl (OXYCONTIN PO)    PARoxetine (PAXIL) 10 mg tablet    ranitidine (ZANTAC) 150 MG capsule     Current Facility-Administered Medications   Medication Dose Route Frequency    acetaminophen (TYLENOL) tablet 650 mg  650 mg Oral Q6H PRN    atenolol (TENORMIN) tablet 50 mg  50 mg Oral Daily    atorvastatin (LIPITOR) tablet 20 mg  20 mg Oral Daily With Dinner    docusate sodium (COLACE) capsule 100 mg  100 mg Oral BID    enoxaparin (LOVENOX) subcutaneous injection 40 mg  40 mg Subcutaneous Daily    heparin lock flush 100 units/mL injection 300 Units  300 Units Intracatheter Q1H PRN    HYDROmorphone (DILAUDID) injection 0 2 mg  0 2 mg Intravenous Q4H PRN    ondansetron (ZOFRAN) injection 4 mg  4 mg Intravenous Q6H PRN    oxyCODONE (ROXICODONE) IR tablet 5 mg  5 mg Oral Q4H PRN    pantoprazole (PROTONIX) EC tablet 20 mg  20 mg Oral Early Morning    PARoxetine (PAXIL) tablet 10 mg  10 mg Oral Daily    sodium chloride 0 9 % infusion  50 mL/hr Intravenous Continuous       Allergies   Allergen Reactions    Shellfish Allergy Other (See Comments)     Pt states that her lips swell with all shellfish except for shrimp    Morphine GI Intolerance    Oxycodone GI Intolerance     But patient still takes prn    Oxycodone-Acetaminophen GI Intolerance     Patient takes oxycodone daily prn for severe back pain    Tramadol GI Intolerance     Takes in small amounts           Objective     Blood pressure 130/58, pulse 67, temperature (!) 97 4 °F (36 3 °C), temperature source Oral, resp   rate 18, height 5' (1 524 m), weight 83 1 kg (183 lb 3 2 oz), SpO2 92 %, not currently breastfeeding  Intake/Output Summary (Last 24 hours) at 04/08/18 1156  Last data filed at 04/08/18 1109   Gross per 24 hour   Intake           1594 5 ml   Output                0 ml   Net           1594 5 ml         PHYSICAL EXAM:      General Appearance:   Alert, cooperative, no distress   HEENT:   Normocephalic, atraumatic, anicteric      Neck:  Supple, symmetrical, trachea midline   Lungs:   Clear to auscultation bilaterally; no rales, rhonchi or wheezing; respirations unlabored    Heart[de-identified]   Regular rate and rhythm; no murmur, rub, or gallop     Abdomen:   Soft, diffuse tenderness, non-distended; normal bowel sounds; no masses, no organomegaly    Genitalia:   Deferred    Rectal:   Deferred    Extremities:  No cyanosis, clubbing or edema    Pulses:  2+ and symmetric all extremities    Skin:  No jaundice, rashes, or lesions    Lymph nodes:  No palpable cervical lymphadenopathy        Lab Results:   Admission on 04/07/2018   Component Date Value    WBC 04/07/2018 6 29     RBC 04/07/2018 4 50     Hemoglobin 04/07/2018 13 3     Hematocrit 04/07/2018 41 0     MCV 04/07/2018 91     MCH 04/07/2018 29 6     MCHC 04/07/2018 32 4     RDW 04/07/2018 15 0     MPV 04/07/2018 10 9     Platelets 47/02/1065 143*    nRBC 04/07/2018 0     Neutrophils Relative 04/07/2018 69     Lymphocytes Relative 04/07/2018 21     Monocytes Relative 04/07/2018 8     Eosinophils Relative 04/07/2018 1     Basophils Relative 04/07/2018 0     Neutrophils Absolute 04/07/2018 4 35     Lymphocytes Absolute 04/07/2018 1 33     Monocytes Absolute 04/07/2018 0 49     Eosinophils Absolute 04/07/2018 0 09     Basophils Absolute 04/07/2018 0 02     Sodium 04/07/2018 140     Potassium 04/07/2018 3 6     Chloride 04/07/2018 103     CO2 04/07/2018 30     Anion Gap 04/07/2018 7     BUN 04/07/2018 15     Creatinine 04/07/2018 0 76     Glucose 04/07/2018 108     Calcium 04/07/2018 9 4     AST 04/07/2018 382*    ALT 04/07/2018 771*    Alkaline Phosphatase 04/07/2018 393*    Total Protein 04/07/2018 7 4     Albumin 04/07/2018 3 2*    Total Bilirubin 04/07/2018 1 50*    eGFR 04/07/2018 79     Lipase 04/07/2018 56*    Color, UA 04/07/2018 Yellow     Clarity, UA 04/07/2018 Clear     Specific Gravity, UA 04/07/2018 1 010     pH, UA 04/07/2018 7 5     Leukocytes, UA 04/07/2018 Negative     Nitrite, UA 04/07/2018 Negative     Protein, UA 04/07/2018 Negative     Glucose, UA 04/07/2018 Negative     Ketones, UA 04/07/2018 Negative     Urobilinogen, UA 04/07/2018 0 2     Bilirubin, UA 04/07/2018 Negative     Blood, UA 04/07/2018 Negative     Troponin I 04/07/2018 0 02     Sodium 04/08/2018 139     Potassium 04/08/2018 4 0     Chloride 04/08/2018 105     CO2 04/08/2018 29     Anion Gap 04/08/2018 5     BUN 04/08/2018 14     Creatinine 04/08/2018 0 65     Glucose 04/08/2018 113     Calcium 04/08/2018 8 9     AST 04/08/2018 218*    ALT 04/08/2018 529*    Alkaline Phosphatase 04/08/2018 337*    Total Protein 04/08/2018 6 3*    Albumin 04/08/2018 2 6*    Total Bilirubin 04/08/2018 1 00     eGFR 04/08/2018 90     WBC 04/08/2018 5 11     RBC 04/08/2018 3 99     Hemoglobin 04/08/2018 11 9     Hematocrit 04/08/2018 37 1     MCV 04/08/2018 93     MCH 04/08/2018 29 8     MCHC 04/08/2018 32 1     RDW 04/08/2018 14 9     Platelets 17/56/4259 131*    MPV 04/08/2018 11 5        Imaging Studies: I have personally reviewed pertinent imaging studies

## 2018-04-08 NOTE — PLAN OF CARE
Problem: SAFETY ADULT  Goal: Patient will remain free of falls  INTERVENTIONS:  - Assess patient frequently for physical needs  -  Identify cognitive and physical deficits and behaviors that affect risk of falls    -  [place] fall precautions as indicated by assessment   - Educate patient/family on patient safety including physical limitations  - Instruct patient to call for assistance with activity based on assessment  - Modify environment to reduce risk of injury  - Consider OT/PT consult to assist with strengthening/mobility   Outcome: Progressing  Safety andrew pt rings appropriately, independent

## 2018-04-08 NOTE — ASSESSMENT & PLAN NOTE
Colicky abdominal pain for about 1 month, CT and MRI/MRCP showing choledocholithiasis, she is status post cholecystectomy  · Appreciate GI consult, NPO after midnight  · Pain control with home oxycodone for moderate pain or IV Dilaudid for severe pain  · Antiemetics as needed  · Plan for ERCP on Monday 4/9  · Patient is afebrile, no leukocytosis  · History of the fallopian tube cancer, status post treatment  She has completed chemotherapy, last  within normal limits    Heparin flushes needed for port care

## 2018-04-09 ENCOUNTER — ANESTHESIA (INPATIENT)
Dept: PERIOP | Facility: HOSPITAL | Age: 73
DRG: 446 | End: 2018-04-09
Payer: MEDICARE

## 2018-04-09 ENCOUNTER — ANESTHESIA EVENT (INPATIENT)
Dept: PERIOP | Facility: HOSPITAL | Age: 73
DRG: 446 | End: 2018-04-09
Payer: MEDICARE

## 2018-04-09 ENCOUNTER — APPOINTMENT (INPATIENT)
Dept: RADIOLOGY | Facility: HOSPITAL | Age: 73
DRG: 446 | End: 2018-04-09
Payer: MEDICARE

## 2018-04-09 VITALS
RESPIRATION RATE: 16 BRPM | BODY MASS INDEX: 35.97 KG/M2 | WEIGHT: 183.2 LBS | TEMPERATURE: 98.3 F | HEIGHT: 60 IN | HEART RATE: 64 BPM | OXYGEN SATURATION: 96 % | DIASTOLIC BLOOD PRESSURE: 68 MMHG | SYSTOLIC BLOOD PRESSURE: 161 MMHG

## 2018-04-09 LAB
ALBUMIN SERPL BCP-MCNC: 2.7 G/DL (ref 3.5–5)
ALP SERPL-CCNC: 301 U/L (ref 46–116)
ALT SERPL W P-5'-P-CCNC: 394 U/L (ref 12–78)
ANION GAP SERPL CALCULATED.3IONS-SCNC: 6 MMOL/L (ref 4–13)
AST SERPL W P-5'-P-CCNC: 110 U/L (ref 5–45)
BILIRUB SERPL-MCNC: 0.6 MG/DL (ref 0.2–1)
BUN SERPL-MCNC: 10 MG/DL (ref 5–25)
CALCIUM SERPL-MCNC: 8.9 MG/DL (ref 8.3–10.1)
CHLORIDE SERPL-SCNC: 106 MMOL/L (ref 100–108)
CO2 SERPL-SCNC: 31 MMOL/L (ref 21–32)
CREAT SERPL-MCNC: 0.66 MG/DL (ref 0.6–1.3)
ERYTHROCYTE [DISTWIDTH] IN BLOOD BY AUTOMATED COUNT: 15 % (ref 11.6–15.1)
GFR SERPL CREATININE-BSD FRML MDRD: 89 ML/MIN/1.73SQ M
GLUCOSE SERPL-MCNC: 103 MG/DL (ref 65–140)
HCT VFR BLD AUTO: 39.3 % (ref 34.8–46.1)
HGB BLD-MCNC: 12.4 G/DL (ref 11.5–15.4)
INR PPP: 1.01 (ref 0.86–1.16)
MCH RBC QN AUTO: 29.3 PG (ref 26.8–34.3)
MCHC RBC AUTO-ENTMCNC: 31.6 G/DL (ref 31.4–37.4)
MCV RBC AUTO: 93 FL (ref 82–98)
PLATELET # BLD AUTO: 134 THOUSANDS/UL (ref 149–390)
PMV BLD AUTO: 11.3 FL (ref 8.9–12.7)
POTASSIUM SERPL-SCNC: 4 MMOL/L (ref 3.5–5.3)
PROT SERPL-MCNC: 6.5 G/DL (ref 6.4–8.2)
PROTHROMBIN TIME: 13.5 SECONDS (ref 12.1–14.4)
RBC # BLD AUTO: 4.23 MILLION/UL (ref 3.81–5.12)
SODIUM SERPL-SCNC: 143 MMOL/L (ref 136–145)
WBC # BLD AUTO: 6.01 THOUSAND/UL (ref 4.31–10.16)

## 2018-04-09 PROCEDURE — C1769 GUIDE WIRE: HCPCS | Performed by: INTERNAL MEDICINE

## 2018-04-09 PROCEDURE — 0FF98ZZ FRAGMENTATION IN COMMON BILE DUCT, VIA NATURAL OR ARTIFICIAL OPENING ENDOSCOPIC: ICD-10-PCS | Performed by: INTERNAL MEDICINE

## 2018-04-09 PROCEDURE — 43265 ERCP LITHOTRIPSY CALCULI: CPT | Performed by: INTERNAL MEDICINE

## 2018-04-09 PROCEDURE — 99239 HOSP IP/OBS DSCHRG MGMT >30: CPT | Performed by: NURSE PRACTITIONER

## 2018-04-09 PROCEDURE — 80053 COMPREHEN METABOLIC PANEL: CPT | Performed by: PHYSICIAN ASSISTANT

## 2018-04-09 PROCEDURE — 0F798ZZ DILATION OF COMMON BILE DUCT, VIA NATURAL OR ARTIFICIAL OPENING ENDOSCOPIC: ICD-10-PCS | Performed by: INTERNAL MEDICINE

## 2018-04-09 PROCEDURE — 85610 PROTHROMBIN TIME: CPT | Performed by: INTERNAL MEDICINE

## 2018-04-09 PROCEDURE — 43262 ENDO CHOLANGIOPANCREATOGRAPH: CPT | Performed by: INTERNAL MEDICINE

## 2018-04-09 PROCEDURE — 85027 COMPLETE CBC AUTOMATED: CPT | Performed by: PHYSICIAN ASSISTANT

## 2018-04-09 PROCEDURE — 74328 X-RAY BILE DUCT ENDOSCOPY: CPT

## 2018-04-09 PROCEDURE — 43273 ENDOSCOPIC PANCREATOSCOPY: CPT | Performed by: INTERNAL MEDICINE

## 2018-04-09 RX ORDER — FENTANYL CITRATE 50 UG/ML
INJECTION, SOLUTION INTRAMUSCULAR; INTRAVENOUS AS NEEDED
Status: DISCONTINUED | OUTPATIENT
Start: 2018-04-09 | End: 2018-04-09 | Stop reason: SURG

## 2018-04-09 RX ORDER — SUCCINYLCHOLINE CHLORIDE 20 MG/ML
INJECTION INTRAMUSCULAR; INTRAVENOUS AS NEEDED
Status: DISCONTINUED | OUTPATIENT
Start: 2018-04-09 | End: 2018-04-09 | Stop reason: SURG

## 2018-04-09 RX ORDER — LIDOCAINE HYDROCHLORIDE 10 MG/ML
INJECTION, SOLUTION INFILTRATION; PERINEURAL AS NEEDED
Status: DISCONTINUED | OUTPATIENT
Start: 2018-04-09 | End: 2018-04-09 | Stop reason: SURG

## 2018-04-09 RX ORDER — PROPOFOL 10 MG/ML
INJECTION, EMULSION INTRAVENOUS AS NEEDED
Status: DISCONTINUED | OUTPATIENT
Start: 2018-04-09 | End: 2018-04-09 | Stop reason: SURG

## 2018-04-09 RX ORDER — EPHEDRINE SULFATE 50 MG/ML
INJECTION, SOLUTION INTRAVENOUS AS NEEDED
Status: DISCONTINUED | OUTPATIENT
Start: 2018-04-09 | End: 2018-04-09 | Stop reason: SURG

## 2018-04-09 RX ORDER — ONDANSETRON 2 MG/ML
INJECTION INTRAMUSCULAR; INTRAVENOUS AS NEEDED
Status: DISCONTINUED | OUTPATIENT
Start: 2018-04-09 | End: 2018-04-09 | Stop reason: SURG

## 2018-04-09 RX ORDER — MIDAZOLAM HYDROCHLORIDE 1 MG/ML
INJECTION INTRAMUSCULAR; INTRAVENOUS AS NEEDED
Status: DISCONTINUED | OUTPATIENT
Start: 2018-04-09 | End: 2018-04-09 | Stop reason: SURG

## 2018-04-09 RX ORDER — FENTANYL CITRATE/PF 50 MCG/ML
50 SYRINGE (ML) INJECTION
Status: DISCONTINUED | OUTPATIENT
Start: 2018-04-09 | End: 2018-04-09 | Stop reason: HOSPADM

## 2018-04-09 RX ORDER — ONDANSETRON 2 MG/ML
4 INJECTION INTRAMUSCULAR; INTRAVENOUS ONCE
Status: DISCONTINUED | OUTPATIENT
Start: 2018-04-09 | End: 2018-04-09 | Stop reason: HOSPADM

## 2018-04-09 RX ADMIN — SODIUM CHLORIDE: 0.9 INJECTION, SOLUTION INTRAVENOUS at 11:33

## 2018-04-09 RX ADMIN — ATORVASTATIN CALCIUM 20 MG: 20 TABLET, FILM COATED ORAL at 15:44

## 2018-04-09 RX ADMIN — DOCUSATE SODIUM 100 MG: 100 CAPSULE, LIQUID FILLED ORAL at 08:10

## 2018-04-09 RX ADMIN — GLUCAGON HYDROCHLORIDE 0.2 MG: KIT at 11:09

## 2018-04-09 RX ADMIN — MIDAZOLAM 2 MG: 1 INJECTION INTRAMUSCULAR; INTRAVENOUS at 10:45

## 2018-04-09 RX ADMIN — EPHEDRINE SULFATE 15 MG: 50 INJECTION, SOLUTION INTRAMUSCULAR; INTRAVENOUS; SUBCUTANEOUS at 10:55

## 2018-04-09 RX ADMIN — PANTOPRAZOLE SODIUM 20 MG: 20 TABLET, DELAYED RELEASE ORAL at 06:22

## 2018-04-09 RX ADMIN — ONDANSETRON 4 MG: 2 INJECTION INTRAMUSCULAR; INTRAVENOUS at 03:07

## 2018-04-09 RX ADMIN — SUCCINYLCHOLINE CHLORIDE 100 MG: 20 INJECTION, SOLUTION INTRAMUSCULAR; INTRAVENOUS at 10:51

## 2018-04-09 RX ADMIN — DOCUSATE SODIUM 100 MG: 100 CAPSULE, LIQUID FILLED ORAL at 17:09

## 2018-04-09 RX ADMIN — ATENOLOL 50 MG: 50 TABLET ORAL at 08:10

## 2018-04-09 RX ADMIN — ONDANSETRON 4 MG: 2 INJECTION INTRAMUSCULAR; INTRAVENOUS at 13:21

## 2018-04-09 RX ADMIN — ONDANSETRON 4 MG: 2 INJECTION INTRAMUSCULAR; INTRAVENOUS at 10:51

## 2018-04-09 RX ADMIN — PAROXETINE HYDROCHLORIDE 10 MG: 20 TABLET, FILM COATED ORAL at 08:10

## 2018-04-09 RX ADMIN — GLUCAGON HYDROCHLORIDE 0.4 MG: KIT at 11:00

## 2018-04-09 RX ADMIN — PROPOFOL 140 MG: 10 INJECTION, EMULSION INTRAVENOUS at 10:51

## 2018-04-09 RX ADMIN — EPHEDRINE SULFATE 10 MG: 50 INJECTION, SOLUTION INTRAMUSCULAR; INTRAVENOUS; SUBCUTANEOUS at 10:59

## 2018-04-09 RX ADMIN — SODIUM CHLORIDE: 0.9 INJECTION, SOLUTION INTRAVENOUS at 10:45

## 2018-04-09 RX ADMIN — LIDOCAINE HYDROCHLORIDE 100 MG: 10 INJECTION, SOLUTION INFILTRATION; PERINEURAL at 10:51

## 2018-04-09 RX ADMIN — FENTANYL CITRATE 100 MCG: 50 INJECTION, SOLUTION INTRAMUSCULAR; INTRAVENOUS at 10:51

## 2018-04-09 RX ADMIN — HYDROMORPHONE HYDROCHLORIDE 0.2 MG: 1 INJECTION, SOLUTION INTRAMUSCULAR; INTRAVENOUS; SUBCUTANEOUS at 03:08

## 2018-04-09 NOTE — ASSESSMENT & PLAN NOTE
GI input appreciated   Patient had ERCP today, lithotripsy performed on 1 cm stone with for free flow of bile observed afterwards   Currently has no pain   Patient started on clears and if tolerates can be discharged

## 2018-04-09 NOTE — OP NOTE
**** GI/ENDOSCOPY REPORT ****:     PATIENT NAME: Cande Kinney  - VISIT ID:  Patient ID: [id number]   YOB: 1945     INTRODUCTION:Endoscopic Retrograde Cholangiopancreatography - A 72 female   patient presents for inpatient Endoscopic Retrograde   Cholangiopancreatography at [facility]  INDICATIONS: Common bilee duct stone seen on MRCP  CONSENT:  The benefits, risks, and alternatives to the procedure were   discussed and informed consent was obtained from the patient  PREPARATION: EKG, pulse, pulse oximetry, and blood pressure were monitored   throughout the procedure  MEDICATIONS:asa 3 Anesthesia-check records     PROCEDURE: The endoscope was passed through the mouth under direct   visualization and advanced to the duodenum  The scope was withdrawn and   the mucosa was carefully examined  FINDINGS:  Cannulation (via the major papilla) of the bile duct was   performed with a guidewire  A 1cm mobile stones was seen in the dilateed   duct  A papillotomy was made up to the transverse fold in the usual   manner  the stone was crushed successfully  Multiple pieces seen in the   duodenum  Air in the bile duct and free flow of bile seen after the   maneuvers were completed  COMPLICATIONS: There were no complications  IMPRESSIONS: Cannulation via the major papilla  A 1cm mobile stones was   seen in the dilateed duct  RECOMMENDATIONS: Follow-up appointment with endoscopist in 2 weeks  ESTIMATED BLOOD LOSS: None  PROCEDURE CODES: 68312 - ERCP with lithotripsy (w/ sphincterotomy); and   50047 - ERCP with sphincterotomy     ICD-9 Codes:     ICD-10 Codes:     PERFORMED BY: Dr Claudette Villavicencio  on 04/09/2018  Version 1, electronically signed by KAYE Foster , D O  on   04/09/2018 at 11:35

## 2018-04-09 NOTE — NURSING NOTE
Temp 98 7 F Oral, Patient tolerated regular diet for dinner, no c/o any abdominal pain or discomfort  No s/s of fever  Patient discharged to home in the care of her daughter  IV removed, wristbands removed, all discharge teaching completed, written prescriptions sent with patient  All belongings sent home  Patient ambulated to [facility] accompanied by daughter  Observed pt d/c safely in stable condition

## 2018-04-09 NOTE — PHYSICIAN ADVISOR
This patient is a 72 y o  y/o female who is admitted to the hospital for Flank Pain (patient presents to the ED with c/o b/l flank pain [id number]  patient also c/o n/v since thursday  )   The patient presented to the ED on 4/7/18 at 0910 and was admitted to the hospital on 4/7/2018 0920  History of Present Illness includes: the patient presented on this admission with colicky abdominal pain over the past month  She stated the pain is in the epigastrium and radiating to the back  Vital signs in the ER are as follows   ED Triage Vitals   Temperature Pulse Respirations Blood Pressure SpO2   04/07/18 0920 04/07/18 0919 04/07/18 0919 04/07/18 0919 04/07/18 0919   97 9 °F (36 6 °C) 64 18 135/61 99 %      Temp Source Heart Rate Source Patient Position - Orthostatic VS BP Location FiO2 (%)   04/07/18 0920 04/07/18 1100 04/07/18 1100 04/07/18 1100 --   Oral Monitor Sitting Left arm       Pain Score       04/07/18 0919       8           On exam the lungs are clear and the abdomen is soft and non-tender  Hgb is 13 3 Cr is 0 7 alk phos is 1 5 and alt is 771 and ast is 382  The patient is being admitted with abdominal pain; the plan of care includes IV dilaudid, ERCP, GI consultation, repeat labs  This patient is appropriate for INPATIENT admission as her length of stay is expected to be at least 2 midnights

## 2018-04-09 NOTE — CASE MANAGEMENT
Initial Clinical Review    Admission: Date/Time/Statement: 4/7/18 @ 1341     Orders Placed This Encounter   Procedures    Inpatient Admission (expected length of stay for this patient is greater than two midnights)     Standing Status:   Standing     Number of Occurrences:   1     Order Specific Question:   Admitting Physician     Answer:   Narayan Higuera     Order Specific Question:   Level of Care     Answer:   Med Surg [16]     Order Specific Question:   Estimated length of stay     Answer:   More than 2 Midnights     Order Specific Question:   Certification     Answer:   I certify that inpatient services are medically necessary for this patient for a duration of greater than two midnights  See H&P and MD Progress Notes for additional information about the patient's course of treatment  ED: Date/Time/Mode of Arrival:   ED Arrival Information     Expected Arrival Acuity Means of Arrival Escorted By Service Admission Type    - 4/7/2018 09:10 Urgent Walk-In Self General Medicine Urgent    Arrival Complaint    BACK PAIN          Chief Complaint:   Chief Complaint   Patient presents with    Flank Pain     patient presents to the ED with c/o b/l flank pain [id number]  patient also c/o n/v since thursday  History of Illness:    Maury Ibarra is a 72 y o  female who presents with 1 month of colicky abdominal pain  Patient has a past medical history which includes fallopian tube cancer status post chemotherapy which was completed in October 2017, cholecystectomy, obesity as well as hypertension and HERNÁN (CPAP broke)     Patient reports about 1 month of abdominal pain, in the epigastrium and radiating around to the back  It is not exacerbated by eating, but she feels it is worse when she goes to bed and will awake her in the middle of the night    Most recently she has had associated nausea/nonbloody emesis      Patient also has baseline constipation, which is improved since she started anticholinergic for bladder issues  Last bowel movement was within the past 1-2 days, baseline      She has previously seen Dr Eckert Overall for GI and now sees Dr Sonya Villanueva, was scheduled for colonoscopy in the next week or so          ED Vital Signs:   ED Triage Vitals   Temperature Pulse Respirations Blood Pressure SpO2   04/07/18 0920 04/07/18 0919 04/07/18 0919 04/07/18 0919 04/07/18 0919   97 9 °F (36 6 °C) 64 18 135/61 99 %      Temp Source Heart Rate Source Patient Position - Orthostatic VS BP Location FiO2 (%)   04/07/18 0920 04/07/18 1100 04/07/18 1100 04/07/18 1100 --   Oral Monitor Sitting Left arm       Pain Score       04/07/18 0919       8        Wt Readings from Last 1 Encounters:   04/07/18 83 1 kg (183 lb 3 2 oz)       Vital Signs (abnormal): wnl     Abnormal Labs/Diagnostic Test Results:    (H) 5 - 45 U/L      Comment:   Specimen collection should occur prior to Sulfasalazine administration due to the potential for falsely depressed results   (H) 12 - 78 U/L     Comment:   Specimen collection should occur prior to Sulfasalazine administration due to the potential for falsely depressed results  Alkaline Phosphatase 393 (H) 46 - 116 U/L     Total Protein 7 4 6 4 - 8 2 g/dL     Albumin 3 2 (L) 3 5 - 5 0 g/dL     Total Bilirubin 1 50 (H) 0 20 - 1 00 mg/dL    Lipase 56, plt  143  MRI abd-      10 mm distal common bile duct filling defect consistent with choledocholithiasis   Associated biliary dilatation              ED Treatment:   Medication Administration from 04/07/2018 0910 to 04/07/2018 1436       Date/Time Order Dose Route Action Action by Comments     04/07/2018 1059 iohexol (OMNIPAQUE) 350 MG/ML injection (MULTI-DOSE) 100 mL 100 mL Intravenous Given Charles Hutchison      04/07/2018 1425 ondansetron (ZOFRAN) injection 4 mg 4 mg Intravenous Given Wai Prasad RN      04/07/2018 1425 HYDROmorphone (DILAUDID) injection 0 5 mg 0 5 mg Intravenous Given Wai Prasad RN           Past Medical/Surgical History: Active Ambulatory Problems     Diagnosis Date Noted    Patient refuses to disclose advance directive information 06/19/2012    Chronic constipation 05/11/2017    Hyperlipidemia 05/11/2017    Disorder of intervertebral disc 05/11/2017    HERNÁN (obstructive sleep apnea) 05/11/2017    Fallopian tube cancer, carcinoma ([facility] [address] ) 01/31/2018    Breast cancer screening 01/31/2018    Obesity (BMI 30-39 9) 02/21/2018    Essential hypertension 02/21/2018    Mild intermittent asthma without complication 05/15/7205    Chronic GERD 02/21/2018    Mood disturbance (HCC) 02/21/2018    Chronic pain disorder 02/21/2018    Chronic rhinitis 02/21/2018    Change in bowel habits 02/28/2018    Abdominal pain 02/28/2018     Resolved Ambulatory Problems     Diagnosis Date Noted    Pelvic mass 05/11/2017    Peritoneal carcinomatosis ([facility] [address] ) 05/11/2017     Past Medical History:   Diagnosis Date    Arthritis     Asthma     Edema     GERD (gastroesophageal reflux disease)     Hypertension     Infectious viral hepatitis     Lower back pain     Pelvic mass     Sleep apnea        Admitting Diagnosis: Choledocholithiasis [K80 50]  Back pain [M54 9]  Epigastric abdominal pain [R10 13]  Elevated LFTs [R79 89]  History of cholecystectomy [Z90 49]    Age/Sex: 72 y o  female    Assessment/Plan:        Abdominal pain   Assessment & Plan     Colicky abdominal pain for about 1 month, CT and MRI/MRCP showing choledocholithiasis, she is status post cholecystectomy  ? Admit patient, GI consult, clear liquid diet  ? Pain control with home oxycodone or IV Dilaudid for severe pain  ? Antiemetics as needed  ? Plan for ERCP on Monday  ? Patient is afebrile, no leukocytosis, hold on antibiotics for now  ? History of the fallopian tube cancer, status post treatment  She has completed chemotherapy, last  within normal limits    Heparin flushes needed for port care          * Choledocholithiasis   Assessment & Plan   Plan as above          Essential hypertension   Assessment & Plan     Continue metoprolol, blood pressure is controlled          Obesity (BMI 30-39  9)   Assessment & Plan     Weight loss encouraged, nutrition consult             VTE Prophylaxis: Enoxaparin (Lovenox)  / sequential compression device   Code Status: FULL  POLST: POLST form is not discussed and not completed at this time  Discussion with family:  No family members present this time, patient declined to have me call anyone     Anticipated Length of Stay:  Patient will be admitted on an Inpatient basis with an anticipated length of stay of  > 2 midnights  Justification for Hospital Stay:  Choledocholithiasis, plan for ERCP     Total Time for Visit, including Counseling / Coordination of Care: 45 minutes    Greater than 50% of this total time spent on direct patient counseling and coordination of care        Admission Orders:  Scheduled Meds:   Current Facility-Administered Medications:  acetaminophen 650 mg Oral Q6H PRN Juan Pablo Boshashank, PA-C    atenolol 50 mg Oral Daily Nohelia Guerra, PA-C    atorvastatin 20 mg Oral Daily With [employer], PA-C    docusate sodium 100 mg Oral BID Nohelia Guerra, PA-C    enoxaparin 40 mg Subcutaneous Daily Nohelia Guerra, PA-C    HYDROmorphone 0 2 mg Intravenous Q4H PRN Nohelia Guerra, PA-C    ondansetron 4 mg Intravenous Q6H PRN Juan Pablo Boandion, PA-C    oxyCODONE 5 mg Oral Q4H PRN Noheliadiaz Guerra, PA-C    pantoprazole 20 mg Oral Early Morning Noheliadiaz Guerra, PA-C    PARoxetine 10 mg Oral Daily Nohelia Guerra, PA-C    sodium chloride 50 mL/hr Intravenous Continuous LJ Castano-ODALYS Last Rate: 50 mL/hr (04/08/18 2354)     Continuous Infusions:   sodium chloride 50 mL/hr Last Rate: 50 mL/hr (04/08/18 2354)     PRN Meds:   acetaminophen    HYDROmorphone  x3    Ondansetron  x2    oxyCODONE     NPO   Up and OOB as da   Inc spirom   cpap  cmp x3 days   SCD  4/8 ERCP   GI consult   4/9 cbc , cmp , pt inr plt 134, ast 110, alt 394, alk phos 301,alb 2 7    GI consult  4/8  Reason for Consult / Principal Problem:   1  Elevated LFTs in setting of CBD dilation and choledocholithiasis- differential diagnosis include distal CBD stones  Of note hepatitis C RNA, hepatitis-B core antibody, hepatitis-B surface antigen were all negative  Fortunately LFTs are trending downward and abdominal pain is improved  LFTs today are ALT of 529, alk-phos of 337, AST of 218  Bilirubin is normal at 1  MRI does show filling defect with a 10 mm distal common bile duct stone  ERCP plan tomorrow  Discussed plan with the patient and family  IM note  4/8  Abdominal pain   Assessment & Plan     Colicky abdominal pain for about 1 month, CT and MRI/MRCP showing choledocholithiasis, she is status post cholecystectomy  ? Appreciate GI consult, NPO after midnight  ? Pain control with home oxycodone for moderate pain or IV Dilaudid for severe pain  ? Antiemetics as needed  ? Plan for ERCP on Monday 4/9  ? Patient is afebrile, no leukocytosis  ? History of the fallopian tube cancer, status post treatment  She has completed chemotherapy, last  within normal limits  Heparin flushes needed for port care          * Choledocholithiasis   Assessment & Plan     Plan as above          HERNÁN (obstructive sleep apnea)   Assessment & Plan     CPAP at night          Essential hypertension   Assessment & Plan     Continue metoprolol, blood pressure is controlled          Obesity (BMI 30-39  9)   Assessment & Plan     Weight loss encouraged, nutrition consult          Elevated LFTs   Assessment & Plan     Secondary to choledocholithiasis, improved today, per GI possibility that the stone is passed, still plan for ERCP tomorrow             VTE Pharmacologic Prophylaxis:   Pharmacologic: Enoxaparin (Lovenox)  Mechanical VTE Prophylaxis in Place:  Yes     Patient Centered Rounds: I have performed bedside rounds with nursing staff today      Discussions with Specialists or Other Care Team Provider: GI note reviewed      Education and Discussions with Family / Patient:  Discussed with the patient regarding the plan for the ERCP tomorrow     Time Spent for Care: 20 minutes  More than 50% of total time spent on counseling and coordination of care as described above      Current Length of Stay: 1 day(s)     Current Patient Status: Inpatient   Certification Statement: The patient will continue to require additional inpatient hospital stay due to ERCP tomorrow for choledocholithiasis     Discharge Plan:  Pending ERCP, and GI clearance - likely tomorrow after ERCP or Tuesday morning    IM note  4/9         Elevated LFTs   Assessment & Plan     · Secondary to cholelithiasis  · LFTs continued to trend down  · GI feels this could possibly due to passing a stone through the common bile duct S it measured 10 mm  · GI still feels appropriate the patient have any ERCP  · Will trend results of ERCP and further recommendations from GI          Choledocholithiasis   Assessment & Plan     Plan as above          * Abdominal pain   Assessment & Plan     Colicky abdominal pain for about 1 month, CT and MRI/MRCP showing choledocholithiasis, she is status post cholecystectomy  ? GI following  ? Patient for ERCP today NPO since midnight  ? Pain control with home oxycodone for moderate pain or IV Dilaudid for severe pain  ? Antiemetics as needed  ? Patient is afebrile, no leukocytosis  ? History of the fallopian tube cancer, status post treatment  She has completed chemotherapy  Heparin flushes needed for port care  ? ? Discharge status post ERCP appreciate GI recommendations here          Essential hypertension   Assessment & Plan     · Blood pressure stable  · Continue metoprolol          HERNÁN (obstructive sleep apnea)   Assessment & Plan     CPAP at night          Obesity (BMI 30-39  9)   Assessment & Plan     · Weight loss encouraged, nutrition consult                VTE Pharmacologic Prophylaxis:   Pharmacologic: Enoxaparin (Lovenox)  Mechanical VTE Prophylaxis in Place: Yes     Patient Centered Rounds: I have performed bedside rounds with nursing staff today      Discussions with Specialists or Other Care Team Provider:  GI note reviewed     Education and Discussions with Family / Patient:  Patient     Time Spent for Care: 25 minutes    More than 50% of total time spent on counseling and coordination of care as described above      Current Length of Stay: 2 day(s)     Current Patient Status: Inpatient   Certification Statement: The patient will continue to require additional inpatient hospital stay due to Need for further diagnostic evaluation given elevated LFTs patient for ERCP today     Discharge Plan:  Pending further diagnostic testing

## 2018-04-09 NOTE — DISCHARGE SUMMARY
Discharge- Morgan Domínguez 1945, 72 y o  female MRN: [id number]    Unit/Bed#: -01 Encounter: [id number]    Primary Care Provider: Ja Brito MD   Date and time admitted to hospital: 4/7/2018  9:20 AM        * Choledocholithiasis   Assessment & Plan    GI input appreciated   Patient had ERCP today, lithotripsy performed on 1 cm stone with for free flow of bile observed afterwards   Currently has no pain   Patient started on clears and if tolerates can be discharged        Elevated LFTs   Assessment & Plan    · Secondary to cholelithiasis  · LFTs continuing to trend down today  · GI felt this was due to passing a stone through the common bile duct S it measured 10 mm, MRCP showed filling defect consistent with choledocholithiasis  · Successful lithotripsy of 1 cm stone today during ERCP  ·         Abdominal pain   Assessment & Plan    · Colicky abdominal pain for about 1 month, CT and MRI/MRCP show showed choledocholithiasis, ERCP done today and lithotripsy performed on 1 cm stone  · Pain now resolved        Essential hypertension   Assessment & Plan    · Blood pressure stable  · Continue metoprolol        HERNÁN (obstructive sleep apnea)   Assessment & Plan    Continue CPAP at night        Obesity (BMI 30-39  9)   Assessment & Plan    · Weight loss encouraged, nutrition consult              Resolved Problems  Date Reviewed: 4/9/2018    None          Consultations During Hospital Stay:  · Gastroenterology    Procedures Performed:     · ERCP with stone lithotripsy    Significant Findings / Test Results:     · MRCP showed cholelithiasis and common bile duct dilatation  · CT of abdomen showed increased intra and extra hepatic biliary dilatation with no focal inflammatory changes of the bowel  Decreasing fatty liver changes and small hepatic cyst   Mild urinary bladder wall thickening  Stable vague density about the distal TRINI of uncertain although doubtful significance      Incidental Findings: · None    Test Results Pending at Discharge (will require follow up): · None     Outpatient Tests Requested:  · None    Complications:  None  Reason for Admission:  Abdominal pain     Hospital Course:     Irma Ferrer is a 72 y o  female patient who originally presented to the hospital on 4/7/2018 due to 1 month of abdominal pain the in the epigastrium that radiated around to her back, described as worse at night   CT the abdomen on admission showed extrahepatic biliary dilatation therefore GI was consulted and an MRCP showed a 10 mm distal common bile duct filling defect consistent with choledocholithiasis and associated biliary dilatation  Therefore, ERCP was done on 04/09 with successful lithotripsy of a 1 cm stone after which the patient's pain was resolved  The patient also had elevated LFTs, which GI believed to be related the stone and at discharge were continually trending down  Condition at Discharge: good     Discharge Day Visit / Exam:     * Please refer to separate progress note for these details *    Discussion with Family:  None    Discharge instructions/Information to patient and family:   See after visit summary for information provided to patient and family  Provisions for Follow-Up Care:  See after visit summary for information related to follow-up care and any pertinent home health orders  Disposition:     Home    For Discharges to [facility] SNF:   · Not Applicable to this Patient - Not Applicable to this Patient    Planned Readmission:  No     Discharge Statement:  I spent 32 minutes discharging the patient  This time was spent on the day of discharge  I had direct contact with the patient on the day of discharge  Greater than 50% of the total time was spent examining patient, answering all patient questions, arranging and discussing plan of care with patient as well as directly providing post-discharge instructions    Additional time then spent on discharge activities  Discharge Medications:  See after visit summary for reconciled discharge medications provided to patient and family        ** Please Note: This note has been constructed using a voice recognition system **

## 2018-04-09 NOTE — ANESTHESIA PREPROCEDURE EVALUATION
Review of Systems/Medical History  Patient summary reviewed    No history of anesthetic complications     Cardiovascular  EKG reviewed, Exercise tolerance: good,  Hypertension controlled,    Pulmonary  Asthma: well controlled/ stable Last rescue: > 1 year ago Asthma type of rescue: PRN inhaler, Sleep apnea CPAP,        GI/Hepatic       Negative  ROS        Endo/Other    Obesity  morbid obesity   GYN    Hysterectomy, Ovarian cancer,        Hematology  Negative hematology ROS      Musculoskeletal       Neurology  Negative neurology ROS      Psychology   Negative psychology ROS              Physical Exam    Airway    Mallampati score: II  TM Distance: >3 FB  Neck ROM: full     Dental   Comment: No loose,     Cardiovascular  Rhythm: regular, Rate: normal,     Pulmonary  Breath sounds clear to auscultation,     Other Findings        Anesthesia Plan  ASA Score- 3     Anesthesia Type- general with ASA Monitors  Additional Monitors:   Airway Plan: ETT  Plan Factors-    Induction- intravenous  Postoperative Plan-   Planned trial extubation    Informed Consent- Anesthetic plan and risks discussed with patient  I personally reviewed this patient with the CRNA  Discussed and agreed on the Anesthesia Plan with the CRNA  Lise Kong

## 2018-04-09 NOTE — ASSESSMENT & PLAN NOTE
· Secondary to cholelithiasis  · LFTs continuing to trend down today  · GI felt this was due to passing a stone through the common bile duct S it measured 10 mm, MRCP showed filling defect consistent with choledocholithiasis  · Successful lithotripsy of 1 cm stone today during ERCP  ·

## 2018-04-09 NOTE — ASSESSMENT & PLAN NOTE
· Secondary to cholelithiasis  · LFTs continued to trend down  · GI feels this could possibly due to passing a stone through the common bile duct S it measured 10 mm  · GI still feels appropriate the patient have any ERCP  · Will trend results of ERCP and further recommendations from GI

## 2018-04-09 NOTE — ASSESSMENT & PLAN NOTE
Colicky abdominal pain for about 1 month, CT and MRI/MRCP showing choledocholithiasis, she is status post cholecystectomy  · GI following  · Patient for ERCP today NPO since midnight  · Pain control with home oxycodone for moderate pain or IV Dilaudid for severe pain  · Antiemetics as needed  · Patient is afebrile, no leukocytosis  · History of the fallopian tube cancer, status post treatment  She has completed chemotherapy  Heparin flushes needed for port care  · ?   Discharge status post ERCP appreciate GI recommendations here

## 2018-04-09 NOTE — ASSESSMENT & PLAN NOTE
· Colicky abdominal pain for about 1 month, CT and MRI/MRCP show showed choledocholithiasis, ERCP done today and lithotripsy performed on 1 cm stone  · Pain now resolved no...

## 2018-04-09 NOTE — DISCHARGE INSTRUCTIONS
Acute Abdominal Pain   WHAT YOU NEED TO KNOW:   The cause of your abdominal pain may not be found  If a cause is found, treatment will depend on what the cause is  DISCHARGE INSTRUCTIONS:   Seek care immediately if:   · You vomit blood or cannot stop vomiting  · You have blood in your bowel movement or it looks like tar  · You have bleeding from your rectum  · Your abdomen is larger than usual, more painful, and hard  · You have severe pain in your abdomen  · You stop passing gas and having bowel movements  · You feel weak, dizzy, or faint  Contact your healthcare provider if:   · You have a fever  · You have new signs and symptoms  · Your symptoms do not get better with treatment  · You have questions or concerns about your condition or care  Medicines  may be given to decrease pain, treat an infection, and manage your symptoms  Take your medicine as directed  Call your healthcare provider if you think your medicine is not helping or if you have side effects  Tell him if you are allergic to any medicine  Keep a list of the medicines, vitamins, and herbs you take  Include the amounts, and when and why you take them  Bring the list or the pill bottles to follow-up visits  Carry your medicine list with you in case of an emergency  Manage your symptoms:   · Apply heat  on your abdomen for 20 to 30 minutes every 2 hours for as many days as directed  Heat helps decrease pain and muscle spasms  · Manage your stress  Stress may cause abdominal pain  Your healthcare provider may recommend relaxation techniques and deep breathing exercises to help decrease your stress  Your healthcare provider may recommend you talk to someone about your stress or anxiety, such as a counselor or a trusted friend  Get plenty of sleep and exercise regularly  · Limit or do not drink alcohol  Alcohol can make your abdominal pain worse   Ask your healthcare provider if it is safe for you to drink alcohol  Also ask how much is safe for you to drink  · Do not smoke  Nicotine and other chemicals in cigarettes can damage your esophagus and stomach  Ask your healthcare provider for information if you currently smoke and need help to quit  E-cigarettes or smokeless tobacco still contain nicotine  Talk to your healthcare provider before you use these products  Make changes to the food you eat as directed:  Do not eat foods that cause abdominal pain or other symptoms  Eat small meals more often  · Eat more high-fiber foods if you are constipated  High-fiber foods include fruits, vegetables, whole-grain foods, and legumes  · Do not eat foods that cause gas if you have bloating  Examples include broccoli, cabbage, and cauliflower  Do not drink soda or carbonated drinks, because these may also cause gas  · Do not eat foods or drinks that contain sorbitol or fructose if you have diarrhea and bloating  Some examples are fruit juices, candy, jelly, and sugar-free gum  · Do not eat high-fat foods, such as fried foods, cheeseburgers, hot dogs, and desserts  · Limit or do not drink caffeine  Caffeine may make symptoms, such as heart burn or nausea, worse  · Drink plenty of liquids to prevent dehydration from diarrhea or vomiting  Ask your healthcare provider how much liquid to drink each day and which liquids are best for you  Follow up with your healthcare provider as directed:  Write down your questions so you remember to ask them during your visits  © 2017 [address] [employer] Information is for End User's use only and may not be sold, redistributed or otherwise used for commercial purposes  All illustrations and images included in CareNotes® are the copyrighted property of [employer]  or [employer]  The above information is an  only  It is not intended as medical advice for individual conditions or treatments   Talk to your doctor, nurse or pharmacist before following any medical regimen to see if it is safe and effective for you  Chronic Hypertension   WHAT YOU NEED TO KNOW:   Hypertension is high blood pressure (BP)  Your BP is the force of your blood moving against the walls of your arteries  Normal BP is less than 120/80  Prehypertension is between 120/80 and 139/89  Hypertension is 140/90 or higher  Hypertension causes your BP to get so high that your heart has to work much harder than normal  This can damage your heart  Chronic hypertension is a long-term condition that you can control with a healthy lifestyle or medicines  A controlled blood pressure helps protect your organs, such as your heart, lungs, brain, and kidneys  DISCHARGE INSTRUCTIONS:   Call 911 for any of the following:   · You have discomfort in your chest that feels like squeezing, pressure, fullness, or pain  · You become confused or have difficulty speaking  · You suddenly feel lightheaded or have trouble breathing  · You have pain or discomfort in your back, neck, jaw, stomach, or arm  Seek care immediately if:   · You have a severe headache or vision loss  · You have weakness in an arm or leg  Contact your healthcare provider if:   · You feel faint, dizzy, confused, or drowsy  · You have been taking your BP medicine and your BP is still higher than your healthcare provider says it should be  · You have questions or concerns about your condition or care  Medicines: You may need any of the following:  · Medicine  may be used to help lower your BP  You may need more than one type of medicine  Take the medicine exactly as directed  · Diuretics  help decrease extra fluid that collects in your body  This will help lower your BP  You may urinate more often while you take this medicine  · Cholesterol medicine  helps lower your cholesterol level  A low cholesterol level helps prevent heart disease and makes it easier to control your blood pressure  · Take your medicine as directed  Contact your healthcare provider if you think your medicine is not helping or if you have side effects  Tell him or her if you are allergic to any medicine  Keep a list of the medicines, vitamins, and herbs you take  Include the amounts, and when and why you take them  Bring the list or the pill bottles to follow-up visits  Carry your medicine list with you in case of an emergency  Follow up with your healthcare provider as directed: You will need to return to have your blood pressure checked and to have other lab tests done  Write down your questions so you remember to ask them during your visits  Manage chronic hypertension:  Talk with your healthcare provider about these and other ways to manage hypertension:  · Take your BP at home  Sit and rest for 5 minutes before you take your BP  Extend your arm and support it on a flat surface  Your arm should be at the same level as your heart  Follow the directions that came with your BP monitor  If possible, take at least 2 BP readings each time  Take your BP at least twice a day at the same times each day, such as morning and evening  Keep a record of your BP readings and bring it to your follow-up visits  Ask your healthcare provider what your blood pressure should be  · Limit sodium (salt) as directed  Too much sodium can affect your fluid balance  Check labels to find low-sodium or no-salt-added foods  Some low-sodium foods use potassium salts for flavor  Too much potassium can also cause health problems  Your healthcare provider will tell you how much sodium and potassium are safe for you to have in a day  He or she may recommend that you limit sodium to 2,300 mg a day  · Follow the meal plan recommended by your healthcare provider  A dietitian or your provider can give you more information on low-sodium plans or the DASH (Dietary Approaches to Stop Hypertension) eating plan   The DASH plan is low in sodium, unhealthy fats, and total fat  It is high in potassium, calcium, and fiber  · Exercise to maintain a healthy weight  Exercise at least 30 minutes per day, on most days of the week  This will help decrease your blood pressure  Ask about the best exercise plan for you  · Decrease stress  This may help lower your BP  Learn ways to relax, such as deep breathing or listening to music  · Limit alcohol  Women should limit alcohol to 1 drink a day  Men should limit alcohol to 2 drinks a day  A drink of alcohol is 12 ounces of beer, 5 ounces of wine, or 1½ ounces of liquor  · Do not smoke  Nicotine and other chemicals in cigarettes and cigars can increase your BP and also cause lung damage  Ask your healthcare provider for information if you currently smoke and need help to quit  E-cigarettes or smokeless tobacco still contain nicotine  Talk to your healthcare provider before you use these products  © 2017 [employer]  Information is for End User's use only and may not be sold, redistributed or otherwise used for commercial purposes  All illustrations and images included in CareNotes® are the copyrighted property of [employer]  or [employer]  The above information is an  only  It is not intended as medical advice for individual conditions or treatments  Talk to your doctor, nurse or pharmacist before following any medical regimen to see if it is safe and effective for you

## 2018-04-09 NOTE — ANESTHESIA POSTPROCEDURE EVALUATION
Post-Op Assessment Note      CV Status:  Stable    Mental Status:  Alert and awake    Hydration Status:  Euvolemic    PONV Controlled:  Controlled    Airway Patency:  Patent    Post Op Vitals Reviewed: Yes          Staff: CRNA           BP   163/72   Temp   97 5   Pulse 63   Resp   18   SpO2   99%

## 2018-04-09 NOTE — PROGRESS NOTES
Progress Note - Shirlene Ran 1945, 72 y o  female MRN: [id number]    Unit/Bed#: -01 Encounter: [id number]    Primary Care Provider: Rica Kang MD   Date and time admitted to hospital: 4/7/2018  9:20 AM        Elevated LFTs   Assessment & Plan    · Secondary to cholelithiasis  · LFTs continued to trend down  · GI feels this could possibly due to passing a stone through the common bile duct S it measured 10 mm  · GI still feels appropriate the patient have any ERCP  · Will trend results of ERCP and further recommendations from GI        Choledocholithiasis   Assessment & Plan    Plan as above        * Abdominal pain   Assessment & Plan    Colicky abdominal pain for about 1 month, CT and MRI/MRCP showing choledocholithiasis, she is status post cholecystectomy  · GI following  · Patient for ERCP today NPO since midnight  · Pain control with home oxycodone for moderate pain or IV Dilaudid for severe pain  · Antiemetics as needed  · Patient is afebrile, no leukocytosis  · History of the fallopian tube cancer, status post treatment  She has completed chemotherapy  Heparin flushes needed for port care  · ? Discharge status post ERCP appreciate GI recommendations here        Essential hypertension   Assessment & Plan    · Blood pressure stable  · Continue metoprolol        HERNÁN (obstructive sleep apnea)   Assessment & Plan    CPAP at night        Obesity (BMI 30-39  9)   Assessment & Plan    · Weight loss encouraged, nutrition consult            VTE Pharmacologic Prophylaxis:   Pharmacologic: Enoxaparin (Lovenox)  Mechanical VTE Prophylaxis in Place: Yes    Patient Centered Rounds: I have performed bedside rounds with nursing staff today  Discussions with Specialists or Other Care Team Provider:  GI note reviewed    Education and Discussions with Family / Patient:  Patient    Time Spent for Care: 25 minutes    More than 50% of total time spent on counseling and coordination of care as described above     Current Length of Stay: 2 day(s)    Current Patient Status: Inpatient   Certification Statement: The patient will continue to require additional inpatient hospital stay due to Need for further diagnostic evaluation given elevated LFTs patient for ERCP today    Discharge Plan:  Pending further diagnostic testing    Code Status: Level 1 - Full Code      Subjective:   Patient continues to report pain across her mid to low back intensified at times to 8/10  Patient does report some improvement on current pain regimen  Patient understands the need for the ERCP however is requesting discharge if possible after testing as she feels she has not slept since being here would feel more comfortable in room bed  Patient made aware depending on the findings of the ERCP we can start to advance the discharge plan however we need further recommendations from the gastroenterology team     Objective:     Vitals:   Temp (24hrs), Av 1 °F (36 7 °C), Min:97 8 °F (36 6 °C), Max:98 6 °F (37 °C)    HR:  [55-62] 55  Resp:  [18] 18  BP: (118-135)/(56-63) 135/63  SpO2:  [92 %-94 %] 92 %  Body mass index is 35 78 kg/m²  Input and Output Summary (last 24 hours): Intake/Output Summary (Last 24 hours) at 18 0843  Last data filed at 18 1900   Gross per 24 hour   Intake          1560 83 ml   Output                0 ml   Net          1560 83 ml       Physical Exam:     Physical Exam   Constitutional: She is oriented to person, place, and time  She appears well-developed and well-nourished  HENT:   Head: Normocephalic and atraumatic  Eyes: Conjunctivae and EOM are normal    Neck: Normal range of motion  Cardiovascular: Normal rate, regular rhythm and normal heart sounds  Pulmonary/Chest: Effort normal and breath sounds normal    Abdominal: Soft  Bowel sounds are normal    Musculoskeletal: She exhibits tenderness  She exhibits no edema  Lumbar back: She exhibits tenderness and pain   She exhibits normal range of motion, no edema and no spasm  Pain thoracic to lumbar spine generalized across back no tenderness to palpation   Neurological: She is alert and oriented to person, place, and time  Skin: Skin is warm and dry  Psychiatric: She has a normal mood and affect  Her behavior is normal          Additional Data:     Labs:      Results from last 7 days  Lab Units 04/09/18  0552  04/07/18  0932   WBC Thousand/uL 6 01  < > 6 29   HEMOGLOBIN g/dL 12 4  < > 13 3   HEMATOCRIT % 39 3  < > 41 0   PLATELETS Thousands/uL 134*  < > 143*   NEUTROS PCT %  --   --  69   LYMPHS PCT %  --   --  21   MONOS PCT %  --   --  8   EOS PCT %  --   --  1   < > = values in this interval not displayed  Results from last 7 days  Lab Units 04/09/18  0552   SODIUM mmol/L 143   POTASSIUM mmol/L 4 0   CHLORIDE mmol/L 106   CO2 mmol/L 31   BUN mg/dL 10   CREATININE mg/dL 0 66   CALCIUM mg/dL 8 9   TOTAL PROTEIN g/dL 6 5   BILIRUBIN TOTAL mg/dL 0 60   ALK PHOS U/L 301*   ALT U/L 394*   AST U/L 110*   GLUCOSE RANDOM mg/dL 103       Results from last 7 days  Lab Units 04/09/18  0552   INR  1 01       * I Have Reviewed All Lab Data Listed Above  * Additional Pertinent Lab Tests Reviewed: Sil[facility] 68 Admission Reviewed    Imaging:    Imaging Reports Reviewed Today Include:  MRI/MRCP noted showing a 10 mm distal common bile duct filling defect consistent with cholelithiasis  Noted associated biliary dilation        Recent Cultures (last 7 days):           Last 24 Hours Medication List:     Current Facility-Administered Medications:  acetaminophen 650 mg Oral Q6H PRN Ricardo Dneson PA-C    atenolol 50 mg Oral Daily Nohelia Guerra PA-C    atorvastatin 20 mg Oral Daily With [employer]NAH    docusate sodium 100 mg Oral BID Nohelia Guerra PA-C    enoxaparin 40 mg Subcutaneous Daily Nohelia Guerra PA-C    HYDROmorphone 0 2 mg Intravenous Q4H PRN Nohelia Guerra PA-C    ondansetron 4 mg Intravenous Q6H PRN Donta Hernandez PA-C    oxyCODONE 5 mg Oral Q4H PRN Donta Hernandez PA-C    pantoprazole 20 mg Oral Early Morning Nohelia Guerra PA-C    PARoxetine 10 mg Oral Daily Nohelia Guerra PA-C    sodium chloride 50 mL/hr Intravenous Continuous Donta Hernandez PA-C Last Rate: 50 mL/hr (04/08/18 8798)        Today, Patient Was Seen By: IZAIAH Jamison    ** Please Note: Dictation voice to text software may have been used in the creation of this document   **

## 2018-04-10 LAB
ATRIAL RATE: 62 BPM
P AXIS: 47 DEGREES
PR INTERVAL: 204 MS
QRS AXIS: -3 DEGREES
QRSD INTERVAL: 124 MS
QT INTERVAL: 442 MS
QTC INTERVAL: 448 MS
T WAVE AXIS: 2 DEGREES
VENTRICULAR RATE: 62 BPM

## 2018-04-10 PROCEDURE — 93010 ELECTROCARDIOGRAM REPORT: CPT | Performed by: INTERNAL MEDICINE

## 2018-04-17 ENCOUNTER — TELEPHONE (OUTPATIENT)
Dept: GASTROENTEROLOGY | Facility: CLINIC | Age: 73
End: 2018-04-17

## 2018-04-17 ENCOUNTER — HOSPITAL ENCOUNTER (OUTPATIENT)
Dept: SLEEP CENTER | Facility: CLINIC | Age: 73
Discharge: HOME | End: 2018-04-17
Payer: MEDICARE

## 2018-04-17 PROCEDURE — 95810 POLYSOM 6/> YRS 4/> PARAM: CPT

## 2018-04-18 ENCOUNTER — HOSPITAL ENCOUNTER (OUTPATIENT)
Dept: INFUSION CENTER | Facility: CLINIC | Age: 73
Discharge: HOME | End: 2018-04-18
Payer: MEDICARE

## 2018-04-18 PROCEDURE — 96523 IRRIG DRUG DELIVERY DEVICE: CPT

## 2018-04-18 RX ADMIN — HEPARIN 300 UNITS: 100 SYRINGE at 08:46

## 2018-04-18 NOTE — PROGRESS NOTES
Sleep Study Documentation    Pre-Sleep Study       Sleep testing procedure explained to patient:YES    Patient napped prior to study:NO    Caffeine:Dayshift worker after 12PM   Caffeine use:NO    Alcohol:Dayshift workers after 5PM: Alcohol use:NO    Typical day for patient:YES       Study Documentation  Diagnostic   Snore: Moderate  Supplemental O2: no      O2 flow rate (L/min) range N/A  O2 flow rate (L/min) final N/A  Minimum SaO2 79%  Baseline SaO2 98%        Mode of Therapy: N/A    EKG abnormalities: no     EEG abnormalities: no    Study Terminated:no    Patient classification: retired       Post-Sleep Study    Medication used at bedtime or during sleep study:NO    Patient reports time it took to fall asleep:less than 20 minutes    Patient reports waking up during study:1 to 2 times  Patient reports returning to sleep without difficulty  Patient reports sleeping 6 to 8 hours without dreaming  Patient reports sleep during study:typical    Patient rated sleepiness: Not sleepy or tired    PAP treatment:no

## 2018-04-24 ENCOUNTER — TELEPHONE (OUTPATIENT)
Dept: SLEEP CENTER | Facility: CLINIC | Age: 73
End: 2018-04-24

## 2018-04-24 NOTE — TELEPHONE ENCOUNTER
Pt made aware of sleep study results and scheduled for cpap titration in [place]  She req to follow up with a sleep physician closer to her home  Phone number for Dr Darron Maldonado office given

## 2018-05-07 ENCOUNTER — OFFICE VISIT (OUTPATIENT)
Dept: GYNECOLOGIC ONCOLOGY | Facility: CLINIC | Age: 73
End: 2018-05-07
Payer: MEDICARE

## 2018-05-07 VITALS
SYSTOLIC BLOOD PRESSURE: 120 MMHG | DIASTOLIC BLOOD PRESSURE: 78 MMHG | HEART RATE: 56 BPM | WEIGHT: 186.6 LBS | RESPIRATION RATE: 16 BRPM | TEMPERATURE: 97.9 F | BODY MASS INDEX: 36.63 KG/M2 | HEIGHT: 60 IN

## 2018-05-07 DIAGNOSIS — C57.00 CARCINOMA OF FALLOPIAN TUBE, UNSPECIFIED LATERALITY (HCC): Primary | ICD-10-CM

## 2018-05-07 PROCEDURE — 99214 OFFICE O/P EST MOD 30 MIN: CPT | Performed by: NURSE PRACTITIONER

## 2018-05-07 NOTE — PROGRESS NOTES
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 and stage IA left ovarian endometrioid cancer diagnosed in May 2017  She completed 6 cycles of chemotherapy in August 2017  She recently had ERCP and lithotripsy performed for a 1cm gall stone  Her constipation has resolved after discontinuing medication she was taking for OAB  She is feeling well  Her  is 2 U/ml  Her performance status is zero  The patient will return to the office in 3 months with a pre-visit  for continued surveillance  She is aware to report any new or concerning symptoms to the office  Relevant Orders                  CHIEF COMPLAINT: Fallopian tube cancer/ovarian cancer surveillance      Subjective:     Problem:  Cancer Staging  History of synchronous fallopian tube cancer and endometrial cancer  Previous therapy:     Fallopian tube cancer, carcinoma ([facility] [address] )    5/11/2017 Initial Diagnosis     #1  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  #2  Hepatitis: AST 1478, ALT 1139 March 1, 2017  Non-viral  Resolved  5/11/2017 Surgery     Exploratory laparotomy, tumor debulking with radical hysterectomy, bilateral salpingo-oophorectomy, radical resection of pelvic tumor with low anterior resection, en bloc pelvic peritoneum adenectomy, resection of 2 5 cm right diaphragm nodule and gastrocolic omentectomy  Final pathology with 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 and 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  Complete cytoreduction to no gross visible residual disease  6/15/2017 - 10/2017 Chemotherapy     Began chemotherapy with weekly Taxol 80mg/m2 and Carboplatin AUC 6 q3 weeks   Completed cycle 6 in October 2017               Patient ID: Carlin Rolle is a 72 y o  female       This is a 72 y o  female last evaluated on January 31, 2018, who presents to the office for fallopian tube/endometrial cancer surveillance  She has been well in the interim and is without acute complaints  She denies nausea or vomiting  Her appetite is appropriate  She is voiding and moving her bowels without difficulty  She denies abdominal or pelvic pain  The patient is without vaginal bleeding or discharge  She was recently treated for cholelithiasis after presenting to the ED with colicky abdominal pain, nausea and vomiting  She reports that her constipation and rectal pressure sensation resolved after stopping OAB medication  She has back pain and has made an appointment with orthopedics  She is ambulatory  Her  remains low at 2 U/ml  Review of Systems   Constitutional: Negative for chills, fatigue, fever and unexpected weight change  HENT: Negative for nosebleeds  Eyes: Negative  Respiratory: Negative for cough, chest tightness, shortness of breath and wheezing  Cardiovascular: Negative for chest pain, palpitations and leg swelling  Gastrointestinal: Negative for abdominal distention, abdominal pain, anal bleeding, blood in stool, constipation, diarrhea, nausea, rectal pain and vomiting  Endocrine: Negative  Genitourinary: Negative for difficulty urinating, dysuria, frequency, hematuria, pelvic pain, urgency, vaginal bleeding, vaginal discharge and vaginal pain  Musculoskeletal: Positive for arthralgias and back pain  Negative for joint swelling  Skin: Negative for color change, pallor and rash  Neurological: Negative for dizziness, weakness, light-headedness, numbness and headaches  Hematological: Negative  Psychiatric/Behavioral: Negative          Current Outpatient Prescriptions   Medication Sig Dispense Refill    atenolol (TENORMIN) 50 mg tablet Take 50 mg by mouth daily      atorvastatin (LIPITOR) 20 mg tablet Take 20 mg by mouth      Cholecalciferol (VITAMIN D3) 2000 units TABS Take 1 tablet by mouth daily      OxyCODONE HCl (OXYCONTIN PO) Take 5 mg by mouth daily as needed        PARoxetine (PAXIL) 10 mg tablet Take 10 mg by mouth daily  0    esomeprazole (NexIUM) 20 mg capsule Take 20 mg by mouth as needed      ranitidine (ZANTAC) 150 MG capsule Take 150 mg by mouth as needed for indigestion or heartburn       No current facility-administered medications for this visit  Allergies   Allergen Reactions    Shellfish Allergy Other (See Comments)     Pt states that her lips swell with all shellfish except for shrimp    Morphine GI Intolerance    Oxycodone GI Intolerance     But patient still takes prn    Oxycodone-Acetaminophen GI Intolerance     Patient takes oxycodone daily prn for severe back pain    Tramadol GI Intolerance     Takes in small amounts       Past Medical History:   Diagnosis Date    Arthritis     Asthma     Edema     GERD (gastroesophageal reflux disease)     Hypertension     Infectious viral hepatitis     Lower back pain     Pelvic mass     Sleep apnea     uses cpap       Past Surgical History:   Procedure Laterality Date    APPENDECTOMY      CHOLECYSTECTOMY      DEBULKING TUMOR/ CYTOREDUCTION N/A 5/11/2017    Procedure: DEBULKING TUMOR/ CYTOREDUCTION: RADICAL ABDOMINAL HYSTERECTOMY, BSO, RADICAL RESECTION OF PELVIC TUMOR WITH PELVIC PERITONECTOMY, RESECTION OF RIGHT DIAPHRAGM TUMOR IMPLANT, OMENTECTOMY;  Surgeon: Lee Fowler MD;  [facility];  Service:     ERCP N/A 4/9/2018    Procedure: ENDOSCOPIC RETROGRADE CHOLANGIOPANCREATOGRAPHY (ERCP);   Surgeon: Eda Bennett MD;  [facility];  Service: Gastroenterology    HYSTERECTOMY      FL LAP,DIAGNOSTIC ABDOMEN N/A 5/11/2017    Procedure: LAPAROSCOPY DIAGNOSTIC;  Surgeon: Lee Fowler MD;  [facility];  Service: Gynecology Oncology    FL PART REMOVAL COLON W ANASTOMOSIS N/A 5/11/2017    Procedure: Splenic flexure mobilization Low anterior resection with EEA 29 coloproctostomy low pelvic anastomosis to 8cm Intraop SPY Fluorescence angiography Intraop flexible sigmoidoscopy;  Surgeon: Collin Johns MD;  [facility];  Service: Colorectal    TONSILLECTOMY         OB History     No data available          Family History   Problem Relation Age of Onset    Breast cancer Sister     Heart failure Mother     Heart failure Father        The following portions of the patient's history were reviewed and updated as appropriate: allergies, current medications, past family history, past medical history, past social history, past surgical history and problem list       Objective:    Blood pressure 120/78, pulse 56, temperature 97 9 °F (36 6 °C), temperature source Oral, resp  rate 16, height 5' (1 524 m), weight 84 6 kg (186 lb 9 6 oz), not currently breastfeeding  Body mass index is 36 44 kg/m²  Physical Exam   Constitutional: She is oriented to person, place, and time  She appears well-developed and well-nourished  No distress  HENT:   Head: Normocephalic and atraumatic  Eyes: Right eye exhibits no discharge  Left eye exhibits no discharge  Neck: Normal range of motion  Neck supple  Cardiovascular: Normal rate, regular rhythm and normal heart sounds  Pulmonary/Chest: Effort normal and breath sounds normal  No respiratory distress  Abdominal: Soft  She exhibits no distension and no mass  There is no tenderness  There is no guarding  Obese   Genitourinary: Vagina normal  No vaginal discharge found  Genitourinary Comments: External genitalia without abnormality  There is no blood, discharge, or mass visualized in the vagina  Uterus, adnexa, and cervix are surgically absent  Musculoskeletal: Normal range of motion  She exhibits no edema or tenderness  Lymphadenopathy:     She has no cervical adenopathy     Neurological: She is alert and oriented to person, place, and time  No cranial nerve deficit  Skin: Skin is warm and dry  No rash noted  She is not diaphoretic  No erythema  No pallor  Psychiatric: She has a normal mood and affect   Her behavior is normal  Judgment and thought content normal          Lab Results   Component Value Date     13 8 06/13/2017     Lab Results   Component Value Date    WBC 6 01 04/09/2018    HGB 12 4 04/09/2018    HCT 39 3 04/09/2018    MCV 93 04/09/2018     (L) 04/09/2018     Lab Results   Component Value Date     04/09/2018    K 4 0 04/09/2018     04/09/2018    CO2 31 04/09/2018    ANIONGAP 6 04/09/2018    BUN 10 04/09/2018    CREATININE 0 66 04/09/2018    GLUCOSE 103 04/09/2018    GLUF 120 (H) 06/13/2017    CALCIUM 8 9 04/09/2018     (H) 04/09/2018     (H) 04/09/2018    ALKPHOS 301 (H) 04/09/2018    PROT 6 5 04/09/2018    BILITOT 0 60 04/09/2018    EGFR 89 04/09/2018

## 2018-05-07 NOTE — PATIENT INSTRUCTIONS
1  Return to the office in 3 months for continued surveillance with pre-visit     2  Contact the office in the interim if you develop any any new or concerning symptoms, including vaginal bleeding, discharge, pain, etc

## 2018-05-07 NOTE — ASSESSMENT & PLAN NOTE
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

## 2018-05-25 ENCOUNTER — OFFICE VISIT (OUTPATIENT)
Dept: GASTROENTEROLOGY | Facility: CLINIC | Age: 73
End: 2018-05-25
Payer: MEDICARE

## 2018-05-25 ENCOUNTER — APPOINTMENT (OUTPATIENT)
Dept: LAB | Facility: CLINIC | Age: 73
End: 2018-05-25
Payer: MEDICARE

## 2018-05-25 VITALS
HEIGHT: 60 IN | WEIGHT: 188 LBS | HEART RATE: 64 BPM | BODY MASS INDEX: 36.91 KG/M2 | SYSTOLIC BLOOD PRESSURE: 130 MMHG | DIASTOLIC BLOOD PRESSURE: 80 MMHG

## 2018-05-25 DIAGNOSIS — K80.50 COMMON BILE DUCT STONE: ICD-10-CM

## 2018-05-25 DIAGNOSIS — Z12.11 SCREENING FOR COLON CANCER: Primary | ICD-10-CM

## 2018-05-25 LAB
ALBUMIN SERPL BCP-MCNC: 3.7 G/DL (ref 3.5–5)
ALP SERPL-CCNC: 117 U/L (ref 46–116)
ALT SERPL W P-5'-P-CCNC: 44 U/L (ref 12–78)
AST SERPL W P-5'-P-CCNC: 28 U/L (ref 5–45)
BILIRUB DIRECT SERPL-MCNC: 0.15 MG/DL (ref 0–0.2)
BILIRUB SERPL-MCNC: 0.47 MG/DL (ref 0.2–1)
LIPASE SERPL-CCNC: 95 U/L (ref 73–393)
PROT SERPL-MCNC: 7.7 G/DL (ref 6.4–8.2)

## 2018-05-25 PROCEDURE — 36415 COLL VENOUS BLD VENIPUNCTURE: CPT

## 2018-05-25 PROCEDURE — 83690 ASSAY OF LIPASE: CPT

## 2018-05-25 PROCEDURE — 80076 HEPATIC FUNCTION PANEL: CPT

## 2018-05-25 PROCEDURE — 99213 OFFICE O/P EST LOW 20 MIN: CPT | Performed by: INTERNAL MEDICINE

## 2018-05-25 RX ORDER — MAGNESIUM HYDROXIDE 1200 MG/15ML
LIQUID ORAL
Refills: 0 | Status: ON HOLD | COMMUNITY
Start: 2018-05-21 | End: 2018-07-18 | Stop reason: ALTCHOICE

## 2018-05-25 RX ORDER — PAROXETINE HYDROCHLORIDE 20 MG/1
TABLET, FILM COATED ORAL
Refills: 0 | Status: ON HOLD | COMMUNITY
Start: 2018-05-14 | End: 2018-07-18 | Stop reason: ALTCHOICE

## 2018-05-25 NOTE — PROGRESS NOTES
[facility]      Chief Complaint:  Back pain    HPI:  Nasir Seymour is a 72 y o   female who presents with low back pain for which she is going to see an orthopedic surgeon  Concerning her recent ERCP with papillotomy and stone removal she is completely asymptomatic  She has no abdominal pain  She has no mid back pain  All her back pain is lower back  She has no change in the color of urine or stool  No jaundice  No nausea or vomiting  Weight is stable  No fever or chills  No other GI symptomatology         Review of Systems:   Constitutional: No fever or chills, feels well, no tiredness, no recent weight gain or weight loss  HENT: No complaints of earache, no hearing loss, no nosebleeds, no nasal discharge, no sore throat, no hoarseness  Eyes: No complaints of eye pain, no red eyes, no discharge from eyes, no itchy eyes  Cardiovascular: No complaints of slow heart rate, no fast heart rate, no chest pain, no palpitations, no leg claudication, no lower extremity edema  Respiratory: No complaints of shortness of breath, no wheezing, no cough, no SOB on exertion, no orthopnea  Gastrointestinal: As noted in HPI  Genitourinary: No complaints of dysuria, no incontinence, no hesitancy, no nocturia  Musculoskeletal: No complaints of arthralgia, no myalgias, no joint swelling or stiffness, no limb pain or swelling  Positive low back pain  Neurological: No complaints of headache, no confusion, no convulsions, no numbness or tingling, no dizziness or fainting, no limb weakness, no difficulty walking  Skin: No complaints of skin rash or skin lesions, no itching, no skin wound, no dry skin  Hematological/Lymphatic: No complaints of swollen glands, does not bleed easy  Allergic/Immunologic: No immunocompromised state  Endocrine:  No complaints of polyuria, no polydipsia     Psychiatric/Behavioral: is not suicidal, no sleep disturbances, no anxiety or depression, no change in personality, no emotional problems  Historical Information   Past Medical History:   Diagnosis Date    Arthritis     Asthma     Edema     GERD (gastroesophageal reflux disease)     Hypertension     Infectious viral hepatitis     Lower back pain     Pelvic mass     Sleep apnea     uses cpap     Past Surgical History:   Procedure Laterality Date    APPENDECTOMY      CHOLECYSTECTOMY      DEBULKING TUMOR/ CYTOREDUCTION N/A 5/11/2017    Procedure: DEBULKING TUMOR/ CYTOREDUCTION: RADICAL ABDOMINAL HYSTERECTOMY, BSO, RADICAL RESECTION OF PELVIC TUMOR WITH PELVIC PERITONECTOMY, RESECTION OF RIGHT DIAPHRAGM TUMOR IMPLANT, OMENTECTOMY;  Surgeon: Tamara Reid MD;  [facility];  Service:     ERCP N/A 4/9/2018    Procedure: ENDOSCOPIC RETROGRADE CHOLANGIOPANCREATOGRAPHY (ERCP); Surgeon: Manuel George MD;  [facility];  Service: Gastroenterology    HYSTERECTOMY      OK LAP,DIAGNOSTIC ABDOMEN N/A 5/11/2017    Procedure: LAPAROSCOPY DIAGNOSTIC;  Surgeon: Tamara Reid MD;  [facility];  Service: Gynecology Oncology    OK PART REMOVAL COLON W ANASTOMOSIS N/A 5/11/2017    Procedure: Splenic flexure mobilization Low anterior resection with EEA 29 coloproctostomy low pelvic anastomosis to 8cm Intraop SPY Fluorescence angiography Intraop flexible sigmoidoscopy;  Surgeon: Emili Villaseñor MD;  [facility];  Service: Colorectal    TONSILLECTOMY       Social History   History   Alcohol Use No     History   Drug Use No     History   Smoking Status    Never Smoker   Smokeless Tobacco    Never Used     Family History   Problem Relation Age of Onset    Breast cancer Sister     Heart failure Mother     Heart failure Father          Current Medications: has a current medication list which includes the following prescription(s): atenolol, atorvastatin, vitamin d3, esomeprazole, oxycodone hcl, paroxetine, paroxetine, ra milk of magnesia, and ranitidine          Vital Signs: /80   Pulse 64   Ht 5' (1 524 m)   Wt 85 3 kg (188 lb)   BMI 36 72 kg/m²       Physical Exam:   Constitutional  General Appearance: No acute distress, well appearing and well nourished  Head  Normocephalic  Eyes  Conjunctivae and lids: No swelling, erythema, or discharge  Pupils and irises: Equal, round and reactive to light  Ears, Nose, Mouth, and Throat  External inspection of ears and nose: Normal  Nasal mucosa, septum and turbinates: Normal without edema or erythema/   Oropharynx: Normal with no erythema, edema, exudate or lesions  Neck  Normal range of motion  Neck supple  Cardiovascular  Auscultation of the heart: Normal rate and rhythm, normal S1 and S2 without murmurs  Examination of the extremities for edema and/or varicosities: Normal  Pulmonary/Chest  Respiratory effort: No increased work of breathing or signs of respiratory distress  Auscultation of lungs: Clear to auscultation, equal breath sounds bilaterally, no wheezes, rales, no rhonchi  Abdomen  Abdomen: Non-tender, no masses  Liver and spleen: No hepatomegaly or splenomegaly  Musculoskeletal  Gait and station: normal   Digits and Nails: normal without clubbing or cyanosis  Inspection/palpation of joints, bones, and muscles: Normal  Neurological  No nystagmus or asterixis  Skin  Skin and subcutaneous tissue: Normal without rashes or lesions  Lymphatic  Palpation of the lymph nodes in neck: No lymphadenopathy     Psychiatric  Orientation to person, place and time: Normal   Mood and affect: Normal          Labs:  Lab Results   Component Value Date     (H) 04/09/2018     (H) 04/09/2018    BUN 10 04/09/2018    CALCIUM 8 9 04/09/2018     04/09/2018    CO2 31 04/09/2018    CREATININE 0 66 04/09/2018    HCT 39 3 04/09/2018    HGB 12 4 04/09/2018    HGBA1C 6 3 03/24/2017    MG 1 8 06/13/2017     (L) 04/09/2018    K 4 0 04/09/2018     04/09/2018    WBC 6 01 04/09/2018         X-Rays & Procedures:   No orders to display           ______________________________________________________________________      Assessment & Plan:     Diagnoses and all orders for this visit:      Common bile duct stone, status post ERCP and papillotomy with removal  -     Hepatic function panel; Future  -     Lipase;  Future    Patient will call me for results and further recommendations

## 2018-05-30 ENCOUNTER — HOSPITAL ENCOUNTER (OUTPATIENT)
Dept: INFUSION CENTER | Facility: CLINIC | Age: 73
Discharge: HOME | End: 2018-05-30
Payer: MEDICARE

## 2018-05-30 PROCEDURE — 96523 IRRIG DRUG DELIVERY DEVICE: CPT

## 2018-05-30 RX ADMIN — HEPARIN SODIUM (PORCINE) LOCK FLUSH IV SOLN 100 UNIT/ML 300 UNITS: 100 SOLUTION at 09:18

## 2018-05-30 NOTE — PROGRESS NOTES
Port flushed per protocol pt offers no complaints blood return noted   Pt aware of next appt declined avs

## 2018-06-07 ENCOUNTER — HOSPITAL ENCOUNTER (OUTPATIENT)
Dept: SLEEP CENTER | Facility: CLINIC | Age: 73
Discharge: HOME | End: 2018-06-07
Payer: MEDICARE

## 2018-06-07 PROCEDURE — 95811 POLYSOM 6/>YRS CPAP 4/> PARM: CPT

## 2018-06-08 NOTE — PROGRESS NOTES
Sleep Study Documentation    Pre-Sleep Study       Sleep testing procedure explained to patient:YES    Patient napped prior to study:NO    Caffeine:Dayshift worker after 12PM   Caffeine use:YES- coffee  6 ounces    Alcohol:Dayshift workers after 5PM: Alcohol use:NO    Typical day for patient:YES       Study Documentation  Treatment   Optimal PAP pressure: 13  Leak:Small  Snore:Eliminated  REM Obtained:yes  Supplemental O2: no    Minimum SaO2 88%  Baseline SaO2 99%  PAP mask tried (list all) Resmed Airfit F10 For Her full face mask medium  PAP mask choice (final) Resmed Airfit F10 For Her full face mask medium  PAP mask type:full face  PAP pressure at which snoring was eliminated 12 cm  Minimum SaO2 at final PAP pressure 93%  Mode of Therapy:CPAP  ETCO2:No  CPAP changed to BiPAP:No    Mode of Therapy:CPAP    EKG abnormalities: no     EEG abnormalities: no    Study Terminated:no    Patient classification: retired       Post-Sleep Study    Medication used at bedtime or during sleep study:NO    Patient reports time it took to fall asleep:30 to 60 minutes    Patient reports waking up during study:3 or more times  Patient reports returning to sleep in greater than 30 minutes  Patient reports sleeping 2 to 4 hours without dreaming  Patient reports sleep during study:worse than usual    Patient rated sleepiness: Somewhat sleepy or tired    PAP treatment:yes: Post PAP treatment patient reports feeling better and  would wear PAP mask at home

## 2018-06-13 ENCOUNTER — TELEPHONE (OUTPATIENT)
Dept: SLEEP CENTER | Facility: CLINIC | Age: 73
End: 2018-06-13

## 2018-06-13 DIAGNOSIS — G47.33 OSA (OBSTRUCTIVE SLEEP APNEA): Primary | ICD-10-CM

## 2018-06-13 NOTE — TELEPHONE ENCOUNTER
Left message for patient that sleep study is complete  She will be seeing Dr Mattie Salcedo in office on 6/28 as wanted a physician closer to home  Instructed to call if any questions  CPAP to be ordered by Dr Mattie Salcedo

## 2018-06-26 ENCOUNTER — TELEPHONE (OUTPATIENT)
Dept: GASTROENTEROLOGY | Facility: CLINIC | Age: 73
End: 2018-06-26

## 2018-06-27 PROBLEM — Z12.11 SCREEN FOR COLON CANCER: Status: ACTIVE | Noted: 2018-06-27

## 2018-07-11 ENCOUNTER — HOSPITAL ENCOUNTER (OUTPATIENT)
Dept: INFUSION CENTER | Facility: CLINIC | Age: 73
Discharge: HOME | End: 2018-07-11

## 2018-07-13 ENCOUNTER — HOSPITAL ENCOUNTER (OUTPATIENT)
Dept: INFUSION CENTER | Facility: CLINIC | Age: 73
Discharge: HOME | End: 2018-07-13
Payer: MEDICARE

## 2018-07-13 PROCEDURE — 96523 IRRIG DRUG DELIVERY DEVICE: CPT

## 2018-07-13 RX ADMIN — HEPARIN 300 UNITS: 100 SYRINGE at 08:51

## 2018-07-13 NOTE — PROGRESS NOTES
Pt offers no complaints  Port accessed, flushed, hep-locked and de-accessed without any issues  Pt is scheduling next appointment at this time   Declines CAIOS

## 2018-07-18 ENCOUNTER — ANESTHESIA (OUTPATIENT)
Dept: PERIOP | Facility: HOSPITAL | Age: 73
End: 2018-07-18
Payer: MEDICARE

## 2018-07-18 ENCOUNTER — ANESTHESIA EVENT (OUTPATIENT)
Dept: PERIOP | Facility: HOSPITAL | Age: 73
End: 2018-07-18
Payer: MEDICARE

## 2018-07-18 ENCOUNTER — HOSPITAL ENCOUNTER (OUTPATIENT)
Facility: HOSPITAL | Age: 73
Setting detail: OUTPATIENT SURGERY
Discharge: HOME | End: 2018-07-18
Attending: INTERNAL MEDICINE | Admitting: INTERNAL MEDICINE
Payer: MEDICARE

## 2018-07-18 VITALS
HEART RATE: 50 BPM | DIASTOLIC BLOOD PRESSURE: 63 MMHG | RESPIRATION RATE: 12 BRPM | OXYGEN SATURATION: 100 % | SYSTOLIC BLOOD PRESSURE: 143 MMHG | TEMPERATURE: 97.4 F

## 2018-07-18 DIAGNOSIS — Z12.11 SCREEN FOR COLON CANCER: ICD-10-CM

## 2018-07-18 PROCEDURE — 45384 COLONOSCOPY W/LESION REMOVAL: CPT | Performed by: INTERNAL MEDICINE

## 2018-07-18 PROCEDURE — 88305 TISSUE EXAM BY PATHOLOGIST: CPT | Performed by: PATHOLOGY

## 2018-07-18 RX ORDER — PROPOFOL 10 MG/ML
INJECTION, EMULSION INTRAVENOUS AS NEEDED
Status: DISCONTINUED | OUTPATIENT
Start: 2018-07-18 | End: 2018-07-18 | Stop reason: SURG

## 2018-07-18 RX ORDER — LIDOCAINE HYDROCHLORIDE 10 MG/ML
INJECTION, SOLUTION INFILTRATION; PERINEURAL AS NEEDED
Status: DISCONTINUED | OUTPATIENT
Start: 2018-07-18 | End: 2018-07-18 | Stop reason: SURG

## 2018-07-18 RX ORDER — SODIUM CHLORIDE, SODIUM LACTATE, POTASSIUM CHLORIDE, CALCIUM CHLORIDE 600; 310; 30; 20 MG/100ML; MG/100ML; MG/100ML; MG/100ML
INJECTION, SOLUTION INTRAVENOUS CONTINUOUS PRN
Status: DISCONTINUED | OUTPATIENT
Start: 2018-07-18 | End: 2018-07-18 | Stop reason: SURG

## 2018-07-18 RX ADMIN — PROPOFOL 50 MG: 10 INJECTION, EMULSION INTRAVENOUS at 08:14

## 2018-07-18 RX ADMIN — SODIUM CHLORIDE, SODIUM LACTATE, POTASSIUM CHLORIDE, AND CALCIUM CHLORIDE: .6; .31; .03; .02 INJECTION, SOLUTION INTRAVENOUS at 07:53

## 2018-07-18 RX ADMIN — LIDOCAINE HYDROCHLORIDE 50 MG: 10 INJECTION, SOLUTION INFILTRATION; PERINEURAL at 08:09

## 2018-07-18 RX ADMIN — PROPOFOL 100 MG: 10 INJECTION, EMULSION INTRAVENOUS at 08:09

## 2018-07-18 RX ADMIN — PROPOFOL 50 MG: 10 INJECTION, EMULSION INTRAVENOUS at 08:18

## 2018-07-18 NOTE — ANESTHESIA POSTPROCEDURE EVALUATION
Post-Op Assessment Note      CV Status:  Stable    Mental Status:  Somnolent    Hydration Status:  Euvolemic    PONV Controlled:  Controlled    Airway Patency:  Patent    Post Op Vitals Reviewed: Yes          Staff: CRNA           BP 93/51 (07/18/18 0822)    Temp (!) 97 4 °F (36 3 °C) (07/18/18 0822)    Pulse 57 (07/18/18 0822)   Resp 18 (07/18/18 0822)    SpO2 92 % (07/18/18 0822)

## 2018-07-18 NOTE — DISCHARGE INSTRUCTIONS
My impression:  Single small polyp status post hot biopsy polypectomy        RECOMMENDATIONS:  Follow up pathology results in 1 week  Repeat colonoscopy in 5 years       Colorectal Polyps   WHAT YOU NEED TO KNOW:   Colorectal polyps are small growths of tissue in the lining of the colon and rectum  Most polyps are hyperplastic polyps and are usually benign (noncancerous)  Certain types of polyps, called adenomatous polyps, may turn into cancer  DISCHARGE INSTRUCTIONS:   Follow up with your healthcare provider or gastroenterologist as directed: You may need to return for more tests, such as another colonoscopy  Write down your questions so you remember to ask them during your visits  Reduce your risk for colorectal polyps:   · Eat a variety of healthy foods:  Healthy foods include fruit, vegetables, whole-grain breads, low-fat dairy products, beans, lean meat, and fish  Ask if you need to be on a special diet  · Maintain a healthy weight:  Ask your healthcare provider if you need to lose weight and how much you need to lose  Ask for help with a weight loss program     · Exercise:  Begin to exercise slowly and do more as you get stronger  Talk with your healthcare provider before you start an exercise program      · Limit alcohol:  Your risk for polyps increases the more you drink  · Do not smoke: If you smoke, it is never too late to quit  Ask for information about how to stop  For support and more information:   · Shikha Herrmann (Levine, Susan. \[facility].\"") [address], [place] [address]  Phone: 2- 498 - 871-5762  Web Address: www digestive  niddk nih gov  Contact your healthcare provider or gastroenterologist if:   · You have a fever  · You have chills, a cough, or feel weak and achy  · You have abdominal pain that does not go away or gets worse after you take medicine  · Your abdomen is swollen  · You are losing weight without trying      · You have questions or concerns about your condition or care  Seek care immediately or call 911 if:   · You have sudden shortness of breath  · You have a fast heart rate, fast breathing, or are too dizzy to stand up  · You have severe abdominal pain  · You see blood in your bowel movement  © 2017 [address] [employer] Information is for End User's use only and may not be sold, redistributed or otherwise used for commercial purposes  All illustrations and images included in CareNotes® are the copyrighted property of A D [employer] M , Inc  or Brandon Boyd  The above information is an  only  It is not intended as medical advice for individual conditions or treatments  Talk to your doctor, nurse or pharmacist before following any medical regimen to see if it is safe and effective for you

## 2018-07-18 NOTE — OP NOTE
Postoperative Note  PATIENT NAME: Alison Cruz  : 1945  MRN: [id number]  [facility]    Surgery Date: 2018    POST-OP DIAGNOSIS: See the impression below    SEDATION: Monitored anesthesia care, check anesthesia records    PHYSICAL EXAM:  Vitals:    18 0713   BP: 130/68   Pulse: 60   Resp: 20   Temp: 97 6 °F (36 4 °C)   SpO2: 98%     There is no height or weight on file to calculate BMI  General: NAD  Heart: S1 & S2 normal, RRR  Lungs: CTA, No rales or rhonchi  Abdomen: Soft, nontender, nondistended, good bowel sounds    CONSENT:  Informed consent was obtained for the procedure, including sedation after explaining the risks and benefits of the procedure  Risks including but not limited to bleeding, perforation, infection, aspiration were discussed in detail  Also explained about less than 100%$ sensitivity with the exam and other alternatives  DESCRIPTION:   Procedure:  Fiberoptic colonoscopy with hot biopsy polypectomy    Indications:  Initial screening colonoscopy for adenoma detection, average risk    ASA 2    Premedicated with mac anesthesia      Patient is notified by me  She is examined prior to the procedure and found to be in stable condition  She is attached a cardiac monitor and pulse oximeter and placed in left lateral position  After adequate intravenous sedation the Olympus video colonoscope was inserted and passed easily to the cecum  The ileocecal valve and appendiceal orifice are identified  The scope was slowly withdrawn with excellent circumferential visualization of the mucosa  The preparation is excellent  In the rectosigmoid area there is a single diminutive polyp  This was removed with hot biopsy technique in its entirety with excellent residual chart  No other inflammatory neoplastic or vascular lesions noted   Retroversion is normal     My impression:  Single small polyp status post hot biopsy polypectomy      RECOMMENDATIONS:  Follow up pathology results in 1 week  Repeat colonoscopy in 5 years    COMPLICATIONS:  None; patient tolerated the procedure well  DISPOSITION: PACU  CONDITION: Stable    Mani Ocasio MD  7/18/2018,8:20 AM        Portions of the record may have been created with voice recognition software   Occasional wrong word or "sound a like" substitutions may have occurred due to the inherent limitations of voice recognition software   Read the chart carefully and recognize, using context, where substitutions have occurred

## 2018-07-18 NOTE — ANESTHESIA PREPROCEDURE EVALUATION
Review of Systems/Medical History  Patient summary reviewed  Chart reviewed  No history of anesthetic complications     Cardiovascular  EKG reviewed, Negative cardio ROS Exercise tolerance (METS): >4,  Hyperlipidemia, Hypertension ,    Pulmonary  Sleep apnea Sleep Study completed,        GI/Hepatic    GERD , Liver disease , Hepatitis ,        Negative  ROS        Endo/Other  Negative endo/other ROS      GYN  Negative gynecology ROS          Hematology  Negative hematology ROS      Musculoskeletal    Arthritis     Neurology  Negative neurology ROS      Psychology   Negative psychology ROS              Physical Exam    Airway    Mallampati score: II  TM Distance: >3 FB       Dental       Cardiovascular  Comment: Negative ROS, Rhythm: regular, Rate: normal,     Pulmonary  Pulmonary exam normal     Other Findings        Anesthesia Plan  ASA Score- 2     Anesthesia Type- IV sedation with anesthesia with ASA Monitors  Additional Monitors:   Airway Plan:         Plan Factors-Patient not instructed to abstain from smoking on day of procedure  Patient did not smoke on day of surgery  Induction- intravenous  Postoperative Plan-     Informed Consent- Anesthetic plan and risks discussed with patient and daughter  I personally reviewed this patient with the CRNA  Discussed and agreed on the Anesthesia Plan with the CRNA  Paula Upton

## 2018-08-08 ENCOUNTER — OFFICE VISIT (OUTPATIENT)
Dept: GYNECOLOGIC ONCOLOGY | Facility: CLINIC | Age: 73
End: 2018-08-08
Payer: MEDICARE

## 2018-08-08 VITALS
SYSTOLIC BLOOD PRESSURE: 120 MMHG | TEMPERATURE: 98 F | WEIGHT: 193.5 LBS | RESPIRATION RATE: 16 BRPM | BODY MASS INDEX: 37.99 KG/M2 | HEIGHT: 60 IN | HEART RATE: 80 BPM | DIASTOLIC BLOOD PRESSURE: 72 MMHG

## 2018-08-08 DIAGNOSIS — C57.00 CARCINOMA OF FALLOPIAN TUBE, UNSPECIFIED LATERALITY (HCC): Primary | ICD-10-CM

## 2018-08-08 PROCEDURE — 99214 OFFICE O/P EST MOD 30 MIN: CPT | Performed by: OBSTETRICS & GYNECOLOGY

## 2018-08-08 NOTE — PROGRESS NOTES
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 and stage IA left ovarian endometrioid cancer  Patient was last seen in this office for a surveillance visit approximately 3 months ago  The patient comes in today with no complaints  The patient denies bowel or bladder problems  The patient has a good appetite and has no abdominal or pelvic pain  Patient denies nausea, vomiting, bloating or any other gastrointestinal symptom  Patient's last  on August 1, 2017 was normal at 4  Fallopian tube cancer, carcinoma ([facility] [address] )    5/11/2017 Initial Diagnosis     #1  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  #2  Hepatitis: AST 1478, ALT 1139 March 1, 2017  Non-viral  Resolved  5/11/2017 Surgery     Exploratory laparotomy, tumor debulking with radical hysterectomy, bilateral salpingo-oophorectomy, radical resection of pelvic tumor with low anterior resection, en bloc pelvic peritoneum adenectomy, resection of 2 5 cm right diaphragm nodule and gastrocolic omentectomy  Final pathology with 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 and 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  Complete cytoreduction to no gross visible residual disease  6/15/2017 - 10/2017 Chemotherapy     Began chemotherapy with weekly Taxol 80mg/m2 and Carboplatin AUC 6 q3 weeks  Completed cycle 6 in October 2017              Review of Systems    Patient Active Problem List   Diagnosis    Patient refuses to disclose advance directive information    Chronic constipation    Hyperlipidemia    Disorder of intervertebral disc    HERNÁN (obstructive sleep apnea)    Fallopian tube cancer, carcinoma (HCC)    Breast cancer screening    Obesity (BMI 30-39  9)    Essential hypertension    Mild intermittent asthma without complication    Chronic GERD    Mood disturbance (HCC)    Chronic pain disorder    Chronic rhinitis    Change in bowel habits    Abdominal pain    Choledocholithiasis    Elevated LFTs    Screen for colon cancer     Social History     Social History    Marital status: Single     Spouse name: N/A    Number of children: N/A    Years of education: N/A     Occupational History    Not on file       Social History Main Topics    Smoking status: Never Smoker    Smokeless tobacco: Never Used    Alcohol use No    Drug use: No    Sexual activity: Not on file     Other Topics Concern    Not on file     Social History Narrative    No narrative on file     Past Medical History:   Diagnosis Date    Arthritis     Asthma     Edema     GERD (gastroesophageal reflux disease)     Hypertension     Infectious viral hepatitis     Lower back pain     Pelvic mass     Sleep apnea     uses cpap       Current Outpatient Prescriptions:     atenolol (TENORMIN) 50 mg tablet, Take 50 mg by mouth daily, Disp: , Rfl:     atorvastatin (LIPITOR) 20 mg tablet, Take 20 mg by mouth, Disp: , Rfl:     Cholecalciferol (VITAMIN D3) 2000 units TABS, Take 1 tablet by mouth daily, Disp: , Rfl:     esomeprazole (NexIUM) 20 mg capsule, Take 20 mg by mouth as needed, Disp: , Rfl:     PARoxetine (PAXIL) 10 mg tablet, Take 10 mg by mouth daily, Disp: , Rfl: 0    ranitidine (ZANTAC) 150 MG capsule, Take 150 mg by mouth as needed for indigestion or heartburn, Disp: , Rfl:   Allergies   Allergen Reactions    Shellfish Allergy Other (See Comments)     Pt states that her lips swell with all shellfish except for shrimp    Morphine GI Intolerance    Oxycodone GI Intolerance     But patient still takes prn    Oxycodone-Acetaminophen GI Intolerance     Patient takes oxycodone daily prn for severe back pain    Tramadol GI Intolerance     Takes in small amounts     There were no vitals filed for this visit  Labs:  CMP  Lab Results   Component Value Date     04/09/2018    K 4 0 04/09/2018     04/09/2018    CO2 31 04/09/2018    ANIONGAP 6 04/09/2018    BUN 10 04/09/2018    CREATININE 0 66 04/09/2018    GLUCOSE 103 04/09/2018    GLUF 120 (H) 06/13/2017    CALCIUM 8 9 04/09/2018    AST 28 05/25/2018    ALT 44 05/25/2018    ALKPHOS 117 (H) 05/25/2018    PROT 7 7 05/25/2018    BILITOT 0 47 05/25/2018    EGFR 89 04/09/2018       BMP  Lab Results   Component Value Date    GLUCOSE 103 04/09/2018    CALCIUM 8 9 04/09/2018     04/09/2018    K 4 0 04/09/2018    CO2 31 04/09/2018     04/09/2018    BUN 10 04/09/2018    CREATININE 0 66 04/09/2018       Lipids  No results found for: CHOL  No results found for: HDL  No results found for: LDLCALC  No results found for: TRIG  No components found for: CHOLHDL    Hemoglobin A1C  Lab Results   Component Value Date    HGBA1C 6 3 03/24/2017       Fasting Glucose  Lab Results   Component Value Date    GLUF 120 (H) 06/13/2017       Insulin     Thyroid  No results found for: TSH, T3DCHLM, D7FQGSY, THYROIDAB    Hepatic Function Panel  Lab Results   Component Value Date    ALT 44 05/25/2018    AST 28 05/25/2018    ALKPHOS 117 (H) 05/25/2018    BILITOT 0 47 05/25/2018       Celiac Disease Antibody Panel  No results found for: ENDOMYSIAL IGA, GLIADIN IGA, GLIADIN IGG, IGA, TISSUE TRANSGLUT AB, TTG IGA   Iron  Lab Results   Component Value Date    IRON 42 (L) 04/17/2017    TIBC 314 04/17/2017    FERRITIN 26 04/17/2017               Physical Exam   Constitutional: She is oriented to person, place, and time  She appears well-developed and well-nourished  No distress  HENT:   Head: Normocephalic and atraumatic     Right Ear: External ear normal    Left Ear: External ear normal  Nose: Nose normal    Mouth/Throat: Oropharynx is clear and moist  No oropharyngeal exudate  Eyes: Conjunctivae and EOM are normal  Pupils are equal, round, and reactive to light  Right eye exhibits no discharge  Left eye exhibits no discharge  No scleral icterus  Neck: Normal range of motion  Neck supple  No JVD present  No tracheal deviation present  No thyromegaly present  Cardiovascular: Normal rate, regular rhythm, normal heart sounds and intact distal pulses  Exam reveals no gallop and no friction rub  No murmur heard  Pulmonary/Chest: Effort normal and breath sounds normal  No stridor  No respiratory distress  She has no wheezes  She has no rales  She exhibits no tenderness  Abdominal: Soft  Bowel sounds are normal  She exhibits no distension and no mass  There is no tenderness  There is no rebound and no guarding  Genitourinary: No vaginal discharge found  Genitourinary Comments: Surgical absence of the cervix, uterus, bilateral fallopian tubes and ovaries   Musculoskeletal: Normal range of motion  She exhibits no edema, tenderness or deformity  Lymphadenopathy:     She has no cervical adenopathy  Neurological: She is alert and oriented to person, place, and time  She has normal reflexes  No cranial nerve deficit  She exhibits normal muscle tone  Coordination normal    Skin: Skin is warm and dry  No rash noted  She is not diaphoretic  No erythema  No pallor  Psychiatric: She has a normal mood and affect  Her behavior is normal  Judgment and thought content normal        Assessment      Synchronous fallopian tube and ovarian cancer currently without evidence of disease  Plan      The patient will continue with three-month surveillance visits  I have ordered a  to be drawn prior to her next visit  Scott C Donzetta Severe, MD, PhD, Gigi Chavez  Attending Physician, [address] [place] [address]

## 2018-08-21 ENCOUNTER — OFFICE VISIT (OUTPATIENT)
Dept: PULMONOLOGY | Facility: CLINIC | Age: 73
End: 2018-08-21
Payer: MEDICARE

## 2018-08-21 VITALS
BODY MASS INDEX: 37.69 KG/M2 | HEART RATE: 68 BPM | SYSTOLIC BLOOD PRESSURE: 140 MMHG | DIASTOLIC BLOOD PRESSURE: 80 MMHG | HEIGHT: 60 IN | OXYGEN SATURATION: 98 % | WEIGHT: 192 LBS

## 2018-08-21 DIAGNOSIS — E66.9 OBESITY (BMI 30-39.9): ICD-10-CM

## 2018-08-21 DIAGNOSIS — G47.33 OSA (OBSTRUCTIVE SLEEP APNEA): Primary | ICD-10-CM

## 2018-08-21 DIAGNOSIS — J45.20 MILD INTERMITTENT ASTHMA WITHOUT COMPLICATION: ICD-10-CM

## 2018-08-21 PROCEDURE — 99214 OFFICE O/P EST MOD 30 MIN: CPT | Performed by: PHYSICIAN ASSISTANT

## 2018-08-21 NOTE — PROGRESS NOTES
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 and stage IA left ovarian endometrioid cancer s/p surgical intervention and chemo therapy completed in 10/2017, HERNÁN, HTN who presents to establish care and to obtain CPAP machine    1  Severe HERNÁN -reviewed sleep study and titration study in detail with patient  Evidence of severe sleep apnea  Following titration study, it was recommended that patient be set up with an auto CPAP with a pressure of 12-16 cmH2O  Will send in CPAP machine  Recommend weight loss  Will need follow-up in 2-3 months after receiving machine  Will fax orders to [facility]  2   Mild intermittent asthma -rescue inhaler as needed  Symptoms exacerbated by dust/cleaning chemicals  Patient is deferring Rx for albuterol at this time  Follow-up in 2-3 months or sooner if needed  Subjective:     Patient ID: Aquilla Merlin is a 72 y o  female  Chief Complaint: needs CPAP machine    HPI  72 y o  female never smoker with significant PMH of stage 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 and stage IA left ovarian endometrioid cancer s/p surgical intervention and chemo therapy completed in 10/2017, HERNÁN, HTN who presents to establish care and to obtain CPAP machine  She states that she was diagnosed with sleep apnea years ago  She was using the CPAP machine without issue  However, eventually the CPAP machine broke about a year ago  Since then, she has been trying to obtain a CPAP machine  Unfortunately, she has been unable to do so  She underwent a sleep study which demonstrated severe sleep apnea  She also had a titration study which optimally titrated her to a pressure of 12-16 cm H2 O auto CPAP  [place] sleepiness score of 18    She admits to falling asleep easily while watching TV and while reading  Patient states that she was diagnosed with mild intermittent asthma  She has not had to use of rescue inhaler in years  Occasionally, however, she will use her daughter's rescue inhaler roughly once a year if she is exposed to excessive amounts of dust/fumes/chemicals from cleaning  Patient does not have a rescue inhaler herself  She denies any fever, chills, chest pain, shortness of breath, cough    Review of Systems  Review of Systems   Constitution: Negative for chills, decreased appetite, fever, malaise/fatigue and weight gain  HENT: Negative for congestion  Eyes: Negative for visual disturbance  Cardiovascular: Negative for dyspnea on exertion, leg swelling and orthopnea  Respiratory: Positive for sleep disturbances due to breathing  Negative for cough, shortness of breath and wheezing  Hematologic/Lymphatic: Negative for adenopathy  Skin: Negative for rash  Musculoskeletal: Negative for muscle weakness  Gastrointestinal: Negative for abdominal pain, diarrhea, nausea and vomiting  Neurological: Positive for excessive daytime sleepiness  Psychiatric/Behavioral: Negative for altered mental status and hallucinations  Allergic/Immunologic: Negative for environmental allergies  Objective:  /80   Pulse 68   Ht 5' (1 524 m)   Wt 87 1 kg (192 lb)   SpO2 98%   BMI 37 50 kg/m²     Physical Exam   Constitutional: She is oriented to person, place, and time  She appears well-developed  obese   HENT:   Head: Normocephalic and atraumatic  Neck: Normal range of motion  Cardiovascular: Normal rate and regular rhythm  No murmur heard  Pulmonary/Chest: Effort normal and breath sounds normal  No respiratory distress  She has no wheezes  She has no rales  She exhibits no tenderness  Abdominal: Soft  There is no tenderness  Musculoskeletal: Normal range of motion  She exhibits no edema or tenderness  Lymphadenopathy:     She has no cervical adenopathy  Neurological: She is alert and oriented to person, place, and time  Skin: Skin is warm and dry  Psychiatric: She has a normal mood and affect  Her behavior is normal    Nursing note and vitals reviewed  Lab/Image Review: Reviewed all pertinent labs/radiology results  Past Medical History:   Diagnosis Date    Arthritis     Asthma     Edema     GERD (gastroesophageal reflux disease)     Hypertension     Infectious viral hepatitis     Lower back pain     Pelvic mass     Sleep apnea     uses cpap       Social History   Substance Use Topics    Smoking status: Never Smoker    Smokeless tobacco: Never Used    Alcohol use No       Family History   Problem Relation Age of Onset    Breast cancer Sister     Heart failure Mother     Heart failure Father        Patient Active Problem List   Diagnosis    Patient refuses to disclose advance directive information    Chronic constipation    Hyperlipidemia    Disorder of intervertebral disc    HERNÁN (obstructive sleep apnea)    [facility] cancer[facility] carcinoma ([facility] [address]    Breast cancer screening    Obesity (BMI 30-39  9)    Essential hypertension    Mild intermittent asthma without complication    Chronic GERD    Mood disturbance (HCC)    Chronic pain disorder    Chronic rhinitis    Change in bowel habits    Abdominal pain    Choledocholithiasis    Elevated LFTs    Screen for colon cancer       Current Outpatient Prescriptions on File Prior to Visit   Medication Sig Dispense Refill    atenolol (TENORMIN) 50 mg tablet Take 50 mg by mouth daily      atorvastatin (LIPITOR) 20 mg tablet Take 20 mg by mouth      Cholecalciferol (VITAMIN D3) 2000 units TABS Take 1 tablet by mouth daily      esomeprazole (NexIUM) 20 mg capsule Take 20 mg by mouth as needed      PARoxetine (PAXIL) 10 mg tablet Take 10 mg by mouth daily  0    ranitidine (ZANTAC) 150 MG capsule Take 150 mg by mouth as needed for indigestion or heartburn       No current facility-administered medications on file prior to visit          Allergies   Allergen Reactions    Shellfish Allergy Other (See Comments)     Pt states that her lips swell with all shellfish except for shrimp    Morphine GI Intolerance    Oxycodone GI Intolerance     But patient still takes prn    Oxycodone-Acetaminophen GI Intolerance     Patient takes oxycodone daily prn for severe back pain    Tramadol GI Intolerance     Takes in small amounts

## 2018-08-24 ENCOUNTER — HOSPITAL ENCOUNTER (OUTPATIENT)
Dept: INFUSION CENTER | Facility: CLINIC | Age: 73
Discharge: HOME | End: 2018-08-24
Payer: MEDICARE

## 2018-08-24 PROCEDURE — 96523 IRRIG DRUG DELIVERY DEVICE: CPT

## 2018-08-24 RX ADMIN — HEPARIN 300 UNITS: 100 SYRINGE at 09:01

## 2018-08-24 NOTE — PROGRESS NOTES
Port flushed per protocol   Pt aware of next appt declined avs  Pt offers no complaints and no complications noted

## 2018-10-19 ENCOUNTER — HOSPITAL ENCOUNTER (OUTPATIENT)
Dept: INFUSION CENTER | Facility: CLINIC | Age: 73
Discharge: HOME | End: 2018-10-19
Payer: MEDICARE

## 2018-10-19 PROCEDURE — 96523 IRRIG DRUG DELIVERY DEVICE: CPT

## 2018-10-19 RX ADMIN — HEPARIN 300 UNITS: 100 SYRINGE at 10:27

## 2018-10-19 NOTE — PROGRESS NOTES
Pt to infusion center for port a cath maintenance  Pt offers no complaints port accessed without issue  Pt has f/u for next flush in 6 weeks   Pt declined AVS

## 2018-11-02 ENCOUNTER — APPOINTMENT (OUTPATIENT)
Dept: LAB | Facility: HOSPITAL | Age: 73
End: 2018-11-02
Attending: OBSTETRICS & GYNECOLOGY
Payer: MEDICARE

## 2018-11-02 DIAGNOSIS — C57.00 CARCINOMA OF FALLOPIAN TUBE, UNSPECIFIED LATERALITY (HCC): ICD-10-CM

## 2018-11-02 LAB — CANCER AG125 SERPL-ACNC: 3.5 U/ML (ref 0–30)

## 2018-11-02 PROCEDURE — 86304 IMMUNOASSAY TUMOR CA 125: CPT

## 2018-11-02 PROCEDURE — 36415 COLL VENOUS BLD VENIPUNCTURE: CPT

## 2018-11-07 ENCOUNTER — OFFICE VISIT (OUTPATIENT)
Dept: GYNECOLOGIC ONCOLOGY | Facility: CLINIC | Age: 73
End: 2018-11-07
Payer: MEDICARE

## 2018-11-07 VITALS
WEIGHT: 199 LBS | BODY MASS INDEX: 39.07 KG/M2 | HEIGHT: 60 IN | RESPIRATION RATE: 18 BRPM | TEMPERATURE: 98 F | DIASTOLIC BLOOD PRESSURE: 82 MMHG | SYSTOLIC BLOOD PRESSURE: 142 MMHG | HEART RATE: 64 BPM

## 2018-11-07 DIAGNOSIS — G62.9 NEUROPATHY: ICD-10-CM

## 2018-11-07 DIAGNOSIS — C57.00 CARCINOMA OF FALLOPIAN TUBE, UNSPECIFIED LATERALITY (HCC): Primary | ICD-10-CM

## 2018-11-07 PROCEDURE — 99214 OFFICE O/P EST MOD 30 MIN: CPT | Performed by: OBSTETRICS & GYNECOLOGY

## 2018-11-07 NOTE — PROGRESS NOTES
Assessment/Plan:    Problem List Items Addressed This Visit     Fallopian tube cancer, carcinoma ([facility] [address] ) - Primary     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 and 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  She underwent exploratory laparotomy in tumor debulking for complete situ reduction in May of 2017 this was followed by 6 cycles of carboplatin and weekly Taxol completed in October of 2017  Since that time the patient has been without evidence of recurrence of disease she is presently doing well  She remains in clinical remission by exam and a  normal at 3  She has been seen by Genetics and is reportedly without known mutation     We will also see her back in 3 months for repeat evaluation and   She will call us earlier if symptoms warrant  Relevant Orders        Neuropathy     The patient notes some peripheral neuropathy in hands predominantly  It is not affecting her activities of daily living  It is not causing pain  We have recommended the use of multivitamin with B complex  CHIEF COMPLAINT:   Follow-up for 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 and stage IA left ovarian endometrioid cancer      Problem:  Cancer Staging  No matching staging information was found for the patient  Previous therapy:     Fallopian tube cancer, carcinoma ([facility] [address] )    5/11/2017 Initial Diagnosis     #1  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  #2  Hepatitis: AST 1478, ALT 1139 March 1, 2017  Non-viral  Resolved            5/11/2017 Surgery     Exploratory laparotomy, tumor debulking with radical hysterectomy, bilateral salpingo-oophorectomy, radical resection of pelvic tumor with low anterior resection, en bloc pelvic peritoneum adenectomy, resection of 2 5 cm right diaphragm nodule and gastrocolic omentectomy  Final pathology with 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 and 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  Complete cytoreduction to no gross visible residual disease  6/15/2017 - 10/2017 Chemotherapy     Began chemotherapy with weekly Taxol 80mg/m2 and Carboplatin AUC 6 q3 weeks  Completed cycle 6 in October 2017  Patient ID: Jayda Montero is a 72 y o  female  Patient is a very pleasant 72-year-old white female with a history of concurrent stage IIIC high-grade papillary serous tumor of the fallopian tube with concurrent right ovarian stage IA endometrioid carcinoma  She underwent complete debulking surgery in May of 2017 followed by 6 cycles of carboplatin and Taxol therapy  She attained a complete remission  She presents today in follow-up  The patient is without complaints she denies any significant nausea vomiting constipation diarrhea fevers chills irregular vaginal bleeding or any other symptoms  She does have some persistent neuropathy in the form of tingling in hands  This does not affect her activities of daily living  She notes that is worse in the morning  She does not take anything for this  She is tolerating her survivor ship well and has no significant psychiatric complaints  Her most recent  is down at baseline of 3  Patient presents for evaluation and management of follow-up for her concurrent cancer  The following portions of the patient's history were reviewed and updated as appropriate: allergies, current medications, past family history, past medical history, past social history, past surgical history and problem list     Review of Systems   Constitutional: Negative  HENT: Negative  Eyes: Negative  Respiratory: Negative  Cardiovascular: Negative  Gastrointestinal: Negative  Endocrine: Negative  Genitourinary: Negative  Musculoskeletal: Negative      Skin: Negative  Neurological: Negative  Hematological: Negative  Psychiatric/Behavioral: Negative  Current Outpatient Prescriptions   Medication Sig Dispense Refill    atenolol (TENORMIN) 50 mg tablet Take 50 mg by mouth daily      atorvastatin (LIPITOR) 20 mg tablet Take 20 mg by mouth      Cholecalciferol (VITAMIN D3) 2000 units TABS Take 1 tablet by mouth daily      esomeprazole (NexIUM) 20 mg capsule Take 20 mg by mouth as needed      PARoxetine (PAXIL) 10 mg tablet Take 10 mg by mouth daily  0    ranitidine (ZANTAC) 150 MG capsule Take 150 mg by mouth as needed for indigestion or heartburn       No current facility-administered medications for this visit  Objective:    Blood pressure 142/82, pulse 64, resp  rate 18, height 5' (1 524 m), weight 90 3 kg (199 lb), not currently breastfeeding  Body mass index is 38 86 kg/m²  Body surface area is 1 86 meters squared  Physical Exam   Constitutional: She is oriented to person, place, and time  She appears well-developed and well-nourished  HENT:   Head: Normocephalic and atraumatic  Eyes: EOM are normal    Neck: Normal range of motion  Neck supple  No thyromegaly present  Cardiovascular: Normal rate, regular rhythm and normal heart sounds  Pulmonary/Chest: Effort normal and breath sounds normal    Abdominal: Soft  Bowel sounds are normal    Well healed laparoscopic incisions  Genitourinary:   Genitourinary Comments: -Normal external female genitalia, normal Bartholin's and [place]'s glands                  -Normal midline urethral meatus   No lesions notes                  -Bladder without fullness mass or tenderness                  -Vagina without lesion or discharge No significant cystocele or rectocele noted                  -Cervix surgically absent                  -Uterus surgically absent                  -Adnexae surgically absent                  - Anus without fissure of lesion     Musculoskeletal: Normal range of motion  Lymphadenopathy:     She has no cervical adenopathy  Neurological: She is alert and oriented to person, place, and time  Skin: Skin is warm and dry  Psychiatric: She has a normal mood and affect   Her behavior is normal        Lab Results   Component Value Date     3 5 11/02/2018

## 2018-11-07 NOTE — LETTER
November 7, 2018     Citlaly Eid MD  [address] [facility] [address]    Patient: Astrid Hashimoto   YOB: 1945   Date of Visit: 11/7/2018       Dear Dr Randle Livers:    Thank you for referring Astrid Hashimoto to me for evaluation  Below are my notes for this consultation  If you have questions, please do not hesitate to call me  I look forward to following your patient along with you  Sincerely,        Kolby Vincent MD        CC: No Recipients  Kolby Vincent MD  11/7/2018  9:56 AM  Sign at close encounter  Assessment/Plan:    Problem List Items Addressed This Visit     Fallopian tube cancer, carcinoma ([facility] [address] ) - Primary     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 and 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  She underwent exploratory laparotomy in tumor debulking for complete situ reduction in May of 2017 this was followed by 6 cycles of carboplatin and weekly Taxol completed in October of 2017  Since that time the patient has been without evidence of recurrence of disease she is presently doing well  She remains in clinical remission by exam and a  normal at 3  She has been seen by Genetics and is reportedly without known mutation     We will also see her back in 3 months for repeat evaluation and   She will call us earlier if symptoms warrant  Relevant Orders        Neuropathy     The patient notes some peripheral neuropathy in hands predominantly  It is not affecting her activities of daily living  It is not causing pain  We have recommended the use of multivitamin with B complex  CHIEF COMPLAINT:   Follow-up for 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 and stage IA left ovarian endometrioid cancer      Problem:  Cancer Staging  No matching staging information was found for the patient        Previous therapy: Fallopian tube cancer, carcinoma ([facility] [address] )    5/11/2017 Initial Diagnosis     #1  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  #2  Hepatitis: AST 1478, ALT 1139 March 1, 2017  Non-viral  Resolved  5/11/2017 Surgery     Exploratory laparotomy, tumor debulking with radical hysterectomy, bilateral salpingo-oophorectomy, radical resection of pelvic tumor with low anterior resection, en bloc pelvic peritoneum adenectomy, resection of 2 5 cm right diaphragm nodule and gastrocolic omentectomy  Final pathology with 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 and 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  Complete cytoreduction to no gross visible residual disease  6/15/2017 - 10/2017 Chemotherapy     Began chemotherapy with weekly Taxol 80mg/m2 and Carboplatin AUC 6 q3 weeks  Completed cycle 6 in October 2017  Patient ID: Mayo Graham is a 72 y o  female  Patient is a very pleasant 81-year-old white female with a history of concurrent stage IIIC high-grade papillary serous tumor of the fallopian tube with concurrent right ovarian stage IA endometrioid carcinoma  She underwent complete debulking surgery in May of 2017 followed by 6 cycles of carboplatin and Taxol therapy  She attained a complete remission  She presents today in follow-up  The patient is without complaints she denies any significant nausea vomiting constipation diarrhea fevers chills irregular vaginal bleeding or any other symptoms  She does have some persistent neuropathy in the form of tingling in hands  This does not affect her activities of daily living  She notes that is worse in the morning  She does not take anything for this  She is tolerating her survivor ship well and has no significant psychiatric complaints  Her most recent  is down at baseline of 3    Patient presents for evaluation and management of follow-up for her concurrent cancer  The following portions of the patient's history were reviewed and updated as appropriate: allergies, current medications, past family history, past medical history, past social history, past surgical history and problem list     Review of Systems   Constitutional: Negative  HENT: Negative  Eyes: Negative  Respiratory: Negative  Cardiovascular: Negative  Gastrointestinal: Negative  Endocrine: Negative  Genitourinary: Negative  Musculoskeletal: Negative  Skin: Negative  Neurological: Negative  Hematological: Negative  Psychiatric/Behavioral: Negative  Current Outpatient Prescriptions   Medication Sig Dispense Refill    atenolol (TENORMIN) 50 mg tablet Take 50 mg by mouth daily      atorvastatin (LIPITOR) 20 mg tablet Take 20 mg by mouth      Cholecalciferol (VITAMIN D3) 2000 units TABS Take 1 tablet by mouth daily      esomeprazole (NexIUM) 20 mg capsule Take 20 mg by mouth as needed      PARoxetine (PAXIL) 10 mg tablet Take 10 mg by mouth daily  0    ranitidine (ZANTAC) 150 MG capsule Take 150 mg by mouth as needed for indigestion or heartburn       No current facility-administered medications for this visit  Objective:    Blood pressure 142/82, pulse 64, resp  rate 18, height 5' (1 524 m), weight 90 3 kg (199 lb), not currently breastfeeding  Body mass index is 38 86 kg/m²  Body surface area is 1 86 meters squared  Physical Exam   Constitutional: She is oriented to person, place, and time  She appears well-developed and well-nourished  HENT:   Head: Normocephalic and atraumatic  Eyes: EOM are normal    Neck: Normal range of motion  Neck supple  No thyromegaly present  Cardiovascular: Normal rate, regular rhythm and normal heart sounds  Pulmonary/Chest: Effort normal and breath sounds normal    Abdominal: Soft  Bowel sounds are normal    Well healed laparoscopic incisions  Genitourinary:   Genitourinary Comments: -Normal external female genitalia, normal Bartholin's and [place]'s glands                  -Normal midline urethral meatus  No lesions notes                  -Bladder without fullness mass or tenderness                  -Vagina without lesion or discharge No significant cystocele or rectocele noted                  -Cervix surgically absent                  -Uterus surgically absent                  -Adnexae surgically absent                  - Anus without fissure of lesion     Musculoskeletal: Normal range of motion  Lymphadenopathy:     She has no cervical adenopathy  Neurological: She is alert and oriented to person, place, and time  Skin: Skin is warm and dry  Psychiatric: She has a normal mood and affect   Her behavior is normal        Lab Results   Component Value Date     3 5 11/02/2018

## 2018-11-07 NOTE — PATIENT INSTRUCTIONS
Please obtain  level approximately 1 week prior to in urine next visit  Next visit will be in 3 months  Consider the use of daily multivitamin plus a vitamin B complex to see if that helps the tingling and numbness in hands

## 2018-11-07 NOTE — ASSESSMENT & PLAN NOTE
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

## 2018-11-07 NOTE — ASSESSMENT & PLAN NOTE
The patient notes some peripheral neuropathy in hands predominantly  It is not affecting her activities of daily living  It is not causing pain  We have recommended the use of multivitamin with B complex

## 2018-11-30 ENCOUNTER — HOSPITAL ENCOUNTER (OUTPATIENT)
Dept: INFUSION CENTER | Facility: CLINIC | Age: 73
Discharge: HOME | End: 2018-11-30

## 2018-12-05 ENCOUNTER — HOSPITAL ENCOUNTER (OUTPATIENT)
Dept: INFUSION CENTER | Facility: CLINIC | Age: 73
Discharge: HOME | End: 2018-12-05
Payer: MEDICARE

## 2018-12-05 PROCEDURE — 96523 IRRIG DRUG DELIVERY DEVICE: CPT

## 2018-12-05 RX ADMIN — HEPARIN 300 UNITS: 100 SYRINGE at 10:27

## 2018-12-05 NOTE — PROGRESS NOTES
Catheter maintenance performed per protocol without complications  Pt discharged in stable condition and next port flush appointment scheduled

## 2018-12-07 ENCOUNTER — OFFICE VISIT (OUTPATIENT)
Dept: PULMONOLOGY | Facility: CLINIC | Age: 73
End: 2018-12-07
Payer: MEDICARE

## 2018-12-07 VITALS
OXYGEN SATURATION: 96 % | HEART RATE: 60 BPM | SYSTOLIC BLOOD PRESSURE: 140 MMHG | BODY MASS INDEX: 39.85 KG/M2 | HEIGHT: 60 IN | WEIGHT: 203 LBS | DIASTOLIC BLOOD PRESSURE: 80 MMHG

## 2018-12-07 DIAGNOSIS — E66.9 OBESITY (BMI 30-39.9): ICD-10-CM

## 2018-12-07 DIAGNOSIS — G47.33 OSA (OBSTRUCTIVE SLEEP APNEA): Primary | ICD-10-CM

## 2018-12-07 PROCEDURE — 99214 OFFICE O/P EST MOD 30 MIN: CPT | Performed by: INTERNAL MEDICINE

## 2018-12-07 NOTE — PROGRESS NOTES
Assessment/Plan:   Diagnoses and all orders for this visit:    HERNÁN (obstructive sleep apnea)    Obesity (BMI 30-39  9)        Severe obstructive sleep apnea, currently on auto CPAP doing well  Reviewed CPAP download compliance with acceptable residual AHI  Patient has decreased nocturnal awakenings and feels well rested when waking up in the morning  Patient is benefitting from the CPAP  To cleaning of the CPAP supplies discussed  Change of CPAP supplies every 6 months discussed  Recommend weight loss  Follow-up in 6 months or p r n  earlier as needed  Return in about 6 months (around 6/7/2019)  All questions are answered to the patient's satisfaction and understanding  She verbalizes understanding  She is encouraged to call with any further questions or concerns  Portions of the record may have been created with voice recognition software  Occasional wrong word or "sound a like" substitutions may have occurred due to the inherent limitations of voice recognition software  Read the chart carefully and recognize, using context, where substitutions have occurred  Electronically Signed by Halle Baugh MD    ______________________________________________________________________    Chief Complaint:   Chief Complaint   Patient presents with    Sleep Apnea       Patient ID: Mack Leger is a 72 y o  y o  female has a past medical history of Arthritis; Asthma; Edema; GERD (gastroesophageal reflux disease); Hypertension; Infectious viral hepatitis; Lower back pain; Pelvic mass; and Sleep apnea  12/7/2018  Patient presents today for follow-up visit  Patient is a very pleasant 47-year-old lady, diagnosed with severe obstructive sleep apnea currently on CPAP doing well  Has decreased nocturnal awakenings and feels well rested when she wakes up in the morning  Review of Systems   Constitutional: Positive for fatigue  Negative for appetite change, chills, diaphoresis, fever and unexpected weight change  HENT: Negative for congestion, ear discharge, ear pain, nosebleeds, postnasal drip, rhinorrhea, sinus pain, sore throat and voice change  Eyes: Negative for pain, discharge and visual disturbance  Respiratory: Negative for apnea, cough, choking, chest tightness, shortness of breath, wheezing and stridor  Cardiovascular: Negative for chest pain, palpitations and leg swelling  Gastrointestinal: Negative for abdominal pain, blood in stool, constipation, diarrhea and vomiting  Endocrine: Negative for cold intolerance, heat intolerance, polydipsia, polyphagia and polyuria  Genitourinary: Negative for difficulty urinating and dysuria  Musculoskeletal: Negative for arthralgias and neck pain  Skin: Negative for pallor and rash  Allergic/Immunologic: Negative for environmental allergies and food allergies  Neurological: Negative for dizziness, speech difficulty, weakness and light-headedness  Hematological: Negative for adenopathy  Does not bruise/bleed easily  Psychiatric/Behavioral: Negative for agitation, confusion and sleep disturbance  The patient is not nervous/anxious  Smoking history: She reports that she has never smoked   She has never used smokeless tobacco     The following portions of the patient's history were reviewed and updated as appropriate: allergies, current medications, past family history, past medical history, past social history, past surgical history and problem list     Immunization History   Administered Date(s) Administered    Influenza 01/26/2018    Pneumococcal Polysaccharide PPV23 07/09/2012    Tdap 07/09/2012     Current Outpatient Prescriptions   Medication Sig Dispense Refill    atenolol (TENORMIN) 50 mg tablet Take 50 mg by mouth daily      atorvastatin (LIPITOR) 20 mg tablet Take 20 mg by mouth      Cholecalciferol (VITAMIN D3) 2000 units TABS Take 1 tablet by mouth daily      PARoxetine (PAXIL) 10 mg tablet Take 10 mg by mouth daily  0    ranitidine (ZANTAC) 150 MG capsule Take 150 mg by mouth as needed for indigestion or heartburn      esomeprazole (NexIUM) 20 mg capsule Take 20 mg by mouth as needed       No current facility-administered medications for this visit  Allergies: Shellfish allergy; Morphine; Oxycodone; Oxycodone-acetaminophen; and Tramadol    Objective:  Vitals:    12/07/18 1137   BP: 140/80   Pulse: 60   SpO2: 96%   Weight: 92 1 kg (203 lb)   Height: 5' (1 524 m)   Oxygen Therapy  SpO2: 96 %    Wt Readings from Last 3 Encounters:   12/07/18 92 1 kg (203 lb)   11/07/18 90 3 kg (199 lb)   08/21/18 87 1 kg (192 lb)     Body mass index is 39 65 kg/m²  Physical Exam   Constitutional: She is oriented to person, place, and time  She appears well-developed and well-nourished  HENT:   Head: Normocephalic and atraumatic  Crowded oropharyngeal airways, Mallampati score 3   Eyes: Pupils are equal, round, and reactive to light  Conjunctivae and EOM are normal    Neck: Normal range of motion  Neck supple  No JVD present  No thyromegaly present  Short and wide neck   Cardiovascular: Normal rate, regular rhythm and normal heart sounds  Exam reveals no gallop and no friction rub  No murmur heard  Pulmonary/Chest: Effort normal and breath sounds normal  No respiratory distress  She has no wheezes  She has no rales  She exhibits no tenderness  Abdominal: Soft  Bowel sounds are normal    Musculoskeletal: Normal range of motion  She exhibits no edema, tenderness or deformity  Lymphadenopathy:     She has no cervical adenopathy  Neurological: She is alert and oriented to person, place, and time  Skin: Skin is warm and dry  Psychiatric: She has a normal mood and affect  Her behavior is normal    Nursing note and vitals reviewed

## 2019-01-30 ENCOUNTER — HOSPITAL ENCOUNTER (OUTPATIENT)
Dept: INFUSION CENTER | Facility: CLINIC | Age: 74
Discharge: HOME | End: 2019-01-30
Payer: MEDICARE

## 2019-01-30 PROCEDURE — 96523 IRRIG DRUG DELIVERY DEVICE: CPT

## 2019-01-30 NOTE — PROGRESS NOTES
Pt here today for port flush, denies any discomforts  Good blood return noted   Pt aware of next appointment, declined AVS

## 2019-01-30 NOTE — PLAN OF CARE
Problem: Potential for Falls  Goal: Patient will remain free of falls  INTERVENTIONS:  - Assess patient frequently for physical needs  -  Identify cognitive and physical deficits and behaviors that affect risk of falls    -  [place] fall precautions as indicated by assessment   - Educate patient/family on patient safety including physical limitations  - Instruct patient to call for assistance with activity based on assessment  - Modify environment to reduce risk of injury  - Consider OT/PT consult to assist with strengthening/mobility   Outcome: Completed Date Met: 01/30/19

## 2019-02-11 PROBLEM — Z08 ENCOUNTER FOR FOLLOW-UP SURVEILLANCE OF CANCER OF FEMALE GENITAL TRACT ORGAN: Status: ACTIVE | Noted: 2019-02-11

## 2019-02-11 PROBLEM — Z08 ENCOUNTER FOR FOLLOW-UP SURVEILLANCE OF OVARIAN CANCER: Status: ACTIVE | Noted: 2019-02-11

## 2019-02-11 PROBLEM — Z85.40 ENCOUNTER FOR FOLLOW-UP SURVEILLANCE OF CANCER OF FEMALE GENITAL TRACT ORGAN: Status: ACTIVE | Noted: 2019-02-11

## 2019-02-11 PROBLEM — Z85.43 HISTORY OF OVARIAN CANCER: Status: ACTIVE | Noted: 2019-02-11

## 2019-02-11 PROBLEM — Z85.44 HISTORY OF CANCER OF FALLOPIAN TUBE IN ADULTHOOD: Status: ACTIVE | Noted: 2018-01-31

## 2019-02-11 PROBLEM — Z85.43 ENCOUNTER FOR FOLLOW-UP SURVEILLANCE OF OVARIAN CANCER: Status: ACTIVE | Noted: 2019-02-11

## 2019-02-18 NOTE — H&P (VIEW-ONLY)
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  She underwent exploratory laparotomy in tumor debulking for complete cytoreduction in May of 2017  This was followed by 6 cycles of carboplatin and weekly Taxol completed in October of 2017  Since that time the patient has been without evidence of recurrence of disease she is presently doing well  She remains in clinical remission by exam and her  is normal at 2  She has been seen previously by Genetics and is reportedly without known mutation  We will continue with the current surveillance plan of visits every 3 months for 2 years and then every 6 months thereafter  She will return in 3 months and we will repeat the  prior to this visit  She will call us earlier if symptoms warrant  History of ovarian cancer    Encounter for follow-up surveillance of cancer of female genital tract organ        CHIEF COMPLAINT:   3 month surveillance visit for history of right fallopian tube cancer and left ovarian cancer     Problem:  Cancer Staging  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  Staging form: Ovary, Fallopian Tube, Primary Peritoneal, AJCC 8th Edition  - Clinical stage from 5/11/2017: FIGO Stage IIIC (cT3c, cN0, cM0) - Signed by Julian Lopez MD on 11/7/2018  - Pathologic stage from 5/11/2017: FIGO Stage IIIC (pT3c, pN0, cM0) - Signed by Julian Lopez MD on 11/7/2018    Previous therapy:     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    5/11/2017 Initial Diagnosis     #1   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  #2  Hepatitis: AST 1478, ALT 1139 March 1, 2017  Non-viral  Resolved  5/11/2017 Surgery     Exploratory laparotomy, tumor debulking with radical hysterectomy, bilateral salpingo-oophorectomy, radical resection of pelvic tumor with low anterior resection, en bloc pelvic peritoneum adenectomy, resection of 2 5 cm right diaphragm nodule and gastrocolic omentectomy  Final pathology with 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 and 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  Complete cytoreduction to no gross visible residual disease  6/15/2017 - 10/2017 Chemotherapy     Began chemotherapy with weekly Taxol 80mg/m2 and Carboplatin AUC 6 q3 weeks  Completed cycle 6 in October 2017  Genetic Testing     No known mutations               Patient ID: Kem Donaldson is a 69 y o  female  The patient presents today for surveillance of Stage IIIC right fallopian tube cancer and Stage IA left ovarian endometriod tumor  Her most recent  level was 2  She has been well in the interim and is without acute complaints  She denies nausea or vomiting  Her appetite is appropriate  She is voiding and moving her bowels without difficulty  She denies abdominal or pelvic pain  The patient is without vaginal bleeding or discharge  She is ambulatory  Emotionally stable  Performance status: 0  Patient desires to have her port removed  She has no long-term issues regarding her chemotherapy with neuropathy this has improved  She is overall doing very well  The following portions of the patient's history were reviewed and updated as appropriate: allergies, current medications, past family history, past medical history, past social history, past surgical history and problem list     Review of Systems   Constitutional: Negative for chills, fatigue and fever  HENT: Negative  Eyes: Negative  Respiratory: Negative  Cardiovascular: Negative  Gastrointestinal: Negative for abdominal pain, constipation, diarrhea, nausea and vomiting  Endocrine: Negative  Genitourinary: Negative for pelvic pain, vaginal bleeding, vaginal discharge and vaginal pain  Musculoskeletal: Negative  Skin: Negative  Allergic/Immunologic: Negative  Neurological: Negative  Hematological: Negative  Psychiatric/Behavioral: Negative  All other systems reviewed and are negative  Current Outpatient Medications   Medication Sig Dispense Refill    atenolol (TENORMIN) 50 mg tablet Take 50 mg by mouth daily      atorvastatin (LIPITOR) 20 mg tablet Take 20 mg by mouth      Cholecalciferol (VITAMIN D3) 2000 units TABS Take 1 tablet by mouth daily      esomeprazole (NexIUM) 20 mg capsule Take 20 mg by mouth as needed      PARoxetine (PAXIL) 10 mg tablet Take 10 mg by mouth daily  0    ranitidine (ZANTAC) 150 MG capsule Take 150 mg by mouth as needed for indigestion or heartburn       No current facility-administered medications for this visit  Objective:    Blood pressure 140/80, pulse 64, temperature 98 °F (36 7 °C), resp  rate 18, height 5' (1 524 m), weight 91 6 kg (202 lb), not currently breastfeeding  Body mass index is 39 45 kg/m²  Body surface area is 1 87 meters squared  Physical Exam   Constitutional: She is oriented to person, place, and time  She appears well-developed and well-nourished  HENT:   Head: Normocephalic and atraumatic  Eyes: EOM are normal    Neck: Normal range of motion  Neck supple  No thyromegaly present  Cardiovascular: Normal rate, regular rhythm and normal heart sounds  Pulmonary/Chest: Effort normal and breath sounds normal    Abdominal: Soft  Bowel sounds are normal    Genitourinary:   Genitourinary Comments: -Normal external female genitalia, normal Bartholin's and [place]'s glands                  -Normal midline urethral meatus   No lesions notes                  -Bladder without fullness mass or tenderness                  -Vagina without lesion or discharge No significant cystocele or rectocele noted                  -Cervix surgically absent                  -Uterus surgically absent                  -Adnexae surgically absent on left, nonpalpable on right                 - Anus without fissure of lesion   Musculoskeletal: Normal range of motion  Lymphadenopathy:     She has no cervical adenopathy  Neurological: She is alert and oriented to person, place, and time  Skin: Skin is warm and dry  Psychiatric: She has a normal mood and affect   Her behavior is normal

## 2019-02-18 NOTE — PROGRESS NOTES
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  She underwent exploratory laparotomy in tumor debulking for complete cytoreduction in May of 2017  This was followed by 6 cycles of carboplatin and weekly Taxol completed in October of 2017  Since that time the patient has been without evidence of recurrence of disease she is presently doing well  She remains in clinical remission by exam and her  is normal at 2  She has been seen previously by Genetics and is reportedly without known mutation  We will continue with the current surveillance plan of visits every 3 months for 2 years and then every 6 months thereafter  She will return in 3 months and we will repeat the  prior to this visit  She will call us earlier if symptoms warrant  History of ovarian cancer    Encounter for follow-up surveillance of cancer of female genital tract organ        CHIEF COMPLAINT:   3 month surveillance visit for history of right fallopian tube cancer and left ovarian cancer     Problem:  Cancer Staging  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  Staging form: Ovary, Fallopian Tube, Primary Peritoneal, AJCC 8th Edition  - Clinical stage from 5/11/2017: FIGO Stage IIIC (cT3c, cN0, cM0) - Signed by Ramírez Shanks MD on 11/7/2018  - Pathologic stage from 5/11/2017: FIGO Stage IIIC (pT3c, pN0, cM0) - Signed by Ramírez Shanks MD on 11/7/2018    Previous therapy:     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    5/11/2017 Initial Diagnosis     #1   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  #2  Hepatitis: AST 1478, ALT 1139 March 1, 2017  Non-viral  Resolved  5/11/2017 Surgery     Exploratory laparotomy, tumor debulking with radical hysterectomy, bilateral salpingo-oophorectomy, radical resection of pelvic tumor with low anterior resection, en bloc pelvic peritoneum adenectomy, resection of 2 5 cm right diaphragm nodule and gastrocolic omentectomy  Final pathology with 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 and 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  Complete cytoreduction to no gross visible residual disease  6/15/2017 - 10/2017 Chemotherapy     Began chemotherapy with weekly Taxol 80mg/m2 and Carboplatin AUC 6 q3 weeks  Completed cycle 6 in October 2017  Genetic Testing     No known mutations               Patient ID: Stefano Merchant is a 69 y o  female  The patient presents today for surveillance of Stage IIIC right fallopian tube cancer and Stage IA left ovarian endometriod tumor  Her most recent  level was 2  She has been well in the interim and is without acute complaints  She denies nausea or vomiting  Her appetite is appropriate  She is voiding and moving her bowels without difficulty  She denies abdominal or pelvic pain  The patient is without vaginal bleeding or discharge  She is ambulatory  Emotionally stable  Performance status: 0  Patient desires to have her port removed  She has no long-term issues regarding her chemotherapy with neuropathy this has improved  She is overall doing very well  The following portions of the patient's history were reviewed and updated as appropriate: allergies, current medications, past family history, past medical history, past social history, past surgical history and problem list     Review of Systems   Constitutional: Negative for chills, fatigue and fever  HENT: Negative  Eyes: Negative  Respiratory: Negative  Cardiovascular: Negative  Gastrointestinal: Negative for abdominal pain, constipation, diarrhea, nausea and vomiting  Endocrine: Negative  Genitourinary: Negative for pelvic pain, vaginal bleeding, vaginal discharge and vaginal pain  Musculoskeletal: Negative  Skin: Negative  Allergic/Immunologic: Negative  Neurological: Negative  Hematological: Negative  Psychiatric/Behavioral: Negative  All other systems reviewed and are negative  Current Outpatient Medications   Medication Sig Dispense Refill    atenolol (TENORMIN) 50 mg tablet Take 50 mg by mouth daily      atorvastatin (LIPITOR) 20 mg tablet Take 20 mg by mouth      Cholecalciferol (VITAMIN D3) 2000 units TABS Take 1 tablet by mouth daily      esomeprazole (NexIUM) 20 mg capsule Take 20 mg by mouth as needed      PARoxetine (PAXIL) 10 mg tablet Take 10 mg by mouth daily  0    ranitidine (ZANTAC) 150 MG capsule Take 150 mg by mouth as needed for indigestion or heartburn       No current facility-administered medications for this visit  Objective:    Blood pressure 140/80, pulse 64, temperature 98 °F (36 7 °C), resp  rate 18, height 5' (1 524 m), weight 91 6 kg (202 lb), not currently breastfeeding  Body mass index is 39 45 kg/m²  Body surface area is 1 87 meters squared  Physical Exam   Constitutional: She is oriented to person, place, and time  She appears well-developed and well-nourished  HENT:   Head: Normocephalic and atraumatic  Eyes: EOM are normal    Neck: Normal range of motion  Neck supple  No thyromegaly present  Cardiovascular: Normal rate, regular rhythm and normal heart sounds  Pulmonary/Chest: Effort normal and breath sounds normal    Abdominal: Soft  Bowel sounds are normal    Genitourinary:   Genitourinary Comments: -Normal external female genitalia, normal Bartholin's and [place]'s glands                  -Normal midline urethral meatus   No lesions notes                  -Bladder without fullness mass or tenderness                  -Vagina without lesion or discharge No significant cystocele or rectocele noted                  -Cervix surgically absent                  -Uterus surgically absent                  -Adnexae surgically absent on left, nonpalpable on right                 - Anus without fissure of lesion   Musculoskeletal: Normal range of motion  Lymphadenopathy:     She has no cervical adenopathy  Neurological: She is alert and oriented to person, place, and time  Skin: Skin is warm and dry  Psychiatric: She has a normal mood and affect   Her behavior is normal

## 2019-02-18 NOTE — ASSESSMENT & PLAN NOTE
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  She underwent exploratory laparotomy in tumor debulking for complete cytoreduction in May of 2017  This was followed by 6 cycles of carboplatin and weekly Taxol completed in October of 2017  Since that time the patient has been without evidence of recurrence of disease she is presently doing well  She remains in clinical remission by exam and her  is normal at 2  She has been seen previously by Genetics and is reportedly without known mutation  We will continue with the current surveillance plan of visits every 3 months for 2 years and then every 6 months thereafter  She will return in 3 months and we will repeat the  prior to this visit  She will call us earlier if symptoms warrant

## 2019-02-20 ENCOUNTER — OFFICE VISIT (OUTPATIENT)
Dept: GYNECOLOGIC ONCOLOGY | Facility: CLINIC | Age: 74
End: 2019-02-20
Payer: MEDICARE

## 2019-02-20 VITALS
HEART RATE: 64 BPM | DIASTOLIC BLOOD PRESSURE: 80 MMHG | TEMPERATURE: 98 F | HEIGHT: 60 IN | WEIGHT: 202 LBS | BODY MASS INDEX: 39.66 KG/M2 | RESPIRATION RATE: 18 BRPM | SYSTOLIC BLOOD PRESSURE: 140 MMHG

## 2019-02-20 DIAGNOSIS — Z08 ENCOUNTER FOR FOLLOW-UP SURVEILLANCE OF OVARIAN CANCER: Primary | ICD-10-CM

## 2019-02-20 DIAGNOSIS — Z85.40 ENCOUNTER FOR FOLLOW-UP SURVEILLANCE OF CANCER OF FEMALE GENITAL TRACT ORGAN: ICD-10-CM

## 2019-02-20 DIAGNOSIS — Z85.43 ENCOUNTER FOR FOLLOW-UP SURVEILLANCE OF OVARIAN CANCER: Primary | ICD-10-CM

## 2019-02-20 DIAGNOSIS — Z08 ENCOUNTER FOR FOLLOW-UP SURVEILLANCE OF CANCER OF FEMALE GENITAL TRACT ORGAN: ICD-10-CM

## 2019-02-20 DIAGNOSIS — Z85.44 HISTORY OF CANCER OF FALLOPIAN TUBE IN ADULTHOOD: ICD-10-CM

## 2019-02-20 DIAGNOSIS — Z85.43 HISTORY OF OVARIAN CANCER: ICD-10-CM

## 2019-02-20 PROCEDURE — 99214 OFFICE O/P EST MOD 30 MIN: CPT | Performed by: OBSTETRICS & GYNECOLOGY

## 2019-02-20 NOTE — LETTER
February 20, 2019     Feliz Montanez MD  [address] [facility] [address] [place] [address]    Patient: Lathan Brunner   YOB: 1945   Date of Visit: 2/20/2019       Dear Dr Klever Jackson:    Thank you for referring Lathan Brunner to me for evaluation  Below are my notes for this consultation  If you have questions, please do not hesitate to call me  I look forward to following your patient along with you  Sincerely,        Gayathri Yen MD        CC: No Recipients  Gayathri Yen MD  2/20/2019 10:12 AM  Incomplete  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  She underwent exploratory laparotomy in tumor debulking for complete cytoreduction in May of 2017  This was followed by 6 cycles of carboplatin and weekly Taxol completed in October of 2017  Since that time the patient has been without evidence of recurrence of disease she is presently doing well  She remains in clinical remission by exam and her  is normal at 2  She has been seen previously by Genetics and is reportedly without known mutation  We will continue with the current surveillance plan of visits every 3 months for 2 years and then every 6 months thereafter  She will return in 3 months and we will repeat the  prior to this visit  She will call us earlier if symptoms warrant           History of ovarian cancer    Encounter for follow-up surveillance of cancer of female genital tract organ        CHIEF COMPLAINT:   3 month surveillance visit for history of right fallopian tube cancer and left ovarian cancer     Problem:  Cancer Staging  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  Staging form: Ovary, Fallopian Tube, Primary Peritoneal, AJCC 8th Edition  - Clinical stage from 5/11/2017: FIGO Stage IIIC (cT3c, cN0, cM0) - Signed by Sisi Pompa MD on 11/7/2018  - Pathologic stage from 5/11/2017: FIGO Stage IIIC (pT3c, pN0, cM0) - Signed by Sisi Pompa MD on 11/7/2018    Previous therapy:     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    5/11/2017 Initial Diagnosis     #1  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  #2  Hepatitis: AST 1478, ALT 1139 March 1, 2017  Non-viral  Resolved  5/11/2017 Surgery     Exploratory laparotomy, tumor debulking with radical hysterectomy, bilateral salpingo-oophorectomy, radical resection of pelvic tumor with low anterior resection, en bloc pelvic peritoneum adenectomy, resection of 2 5 cm right diaphragm nodule and gastrocolic omentectomy  Final pathology with 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 and 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  Complete cytoreduction to no gross visible residual disease  6/15/2017 - 10/2017 Chemotherapy     Began chemotherapy with weekly Taxol 80mg/m2 and Carboplatin AUC 6 q3 weeks  Completed cycle 6 in October 2017  Genetic Testing     No known mutations               Patient ID: Dileep Duarte is a 69 y o  female  The patient presents today for surveillance of Stage IIIC right fallopian tube cancer and Stage IA left ovarian endometriod tumor  Her most recent  level was 2  She has been well in the interim and is without acute complaints  She denies nausea or vomiting  Her appetite is appropriate  She is voiding and moving her bowels without difficulty  She denies abdominal or pelvic pain  The patient is without vaginal bleeding or discharge  She is ambulatory  Emotionally stable  Performance status: 0  Patient desires to have her port removed    She has no long-term issues regarding her chemotherapy with neuropathy this has improved  She is overall doing very well  The following portions of the patient's history were reviewed and updated as appropriate: allergies, current medications, past family history, past medical history, past social history, past surgical history and problem list     Review of Systems   Constitutional: Negative for chills, fatigue and fever  HENT: Negative  Eyes: Negative  Respiratory: Negative  Cardiovascular: Negative  Gastrointestinal: Negative for abdominal pain, constipation, diarrhea, nausea and vomiting  Endocrine: Negative  Genitourinary: Negative for pelvic pain, vaginal bleeding, vaginal discharge and vaginal pain  Musculoskeletal: Negative  Skin: Negative  Allergic/Immunologic: Negative  Neurological: Negative  Hematological: Negative  Psychiatric/Behavioral: Negative  All other systems reviewed and are negative  Current Outpatient Medications   Medication Sig Dispense Refill    atenolol (TENORMIN) 50 mg tablet Take 50 mg by mouth daily      atorvastatin (LIPITOR) 20 mg tablet Take 20 mg by mouth      Cholecalciferol (VITAMIN D3) 2000 units TABS Take 1 tablet by mouth daily      esomeprazole (NexIUM) 20 mg capsule Take 20 mg by mouth as needed      PARoxetine (PAXIL) 10 mg tablet Take 10 mg by mouth daily  0    ranitidine (ZANTAC) 150 MG capsule Take 150 mg by mouth as needed for indigestion or heartburn       No current facility-administered medications for this visit  Objective:    Blood pressure 140/80, pulse 64, temperature 98 °F (36 7 °C), resp  rate 18, height 5' (1 524 m), weight 91 6 kg (202 lb), not currently breastfeeding  Body mass index is 39 45 kg/m²  Body surface area is 1 87 meters squared  Physical Exam   Constitutional: She is oriented to person, place, and time  She appears well-developed and well-nourished     HENT:   Head: Normocephalic and atraumatic  Eyes: EOM are normal    Neck: Normal range of motion  Neck supple  No thyromegaly present  Cardiovascular: Normal rate, regular rhythm and normal heart sounds  Pulmonary/Chest: Effort normal and breath sounds normal    Abdominal: Soft  Bowel sounds are normal    Genitourinary:   Genitourinary Comments: -Normal external female genitalia, normal Bartholin's and [place]'s glands                  -Normal midline urethral meatus  No lesions notes                  -Bladder without fullness mass or tenderness                  -Vagina without lesion or discharge No significant cystocele or rectocele noted                  -Cervix surgically absent                  -Uterus surgically absent                  -Adnexae surgically absent on left, nonpalpable on right                 - Anus without fissure of lesion   Musculoskeletal: Normal range of motion  Lymphadenopathy:     She has no cervical adenopathy  Neurological: She is alert and oriented to person, place, and time  Skin: Skin is warm and dry  Psychiatric: She has a normal mood and affect  Her behavior is normal             Mell Victor MD  2/20/2019 10:11 AM  Sign at close encounter  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  She underwent exploratory laparotomy in tumor debulking for complete cytoreduction in May of 2017  This was followed by 6 cycles of carboplatin and weekly Taxol completed in October of 2017  Since that time the patient has been without evidence of recurrence of disease she is presently doing well    She remains in clinical remission by exam and her  is normal at 2  She has been seen previously by Genetics and is reportedly without known mutation  We will continue with the current surveillance plan of visits every 3 months for 2 years and then every 6 months thereafter  She will return in 3 months and we will repeat the  prior to this visit  She will call us earlier if symptoms warrant  History of ovarian cancer    Encounter for follow-up surveillance of cancer of female genital tract organ        CHIEF COMPLAINT:   3 month surveillance visit for history of right fallopian tube cancer and left ovarian cancer     Problem:  Cancer Staging  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  Staging form: Ovary, Fallopian Tube, Primary Peritoneal, AJCC 8th Edition  - Clinical stage from 5/11/2017: FIGO Stage IIIC (cT3c, cN0, cM0) - Signed by Taylor Mclaughlin MD on 11/7/2018  - Pathologic stage from 5/11/2017: FIGO Stage IIIC (pT3c, pN0, cM0) - Signed by Taylor Mclaughlin MD on 11/7/2018    Previous therapy:     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    5/11/2017 Initial Diagnosis     #1  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  #2  Hepatitis: AST 1478, ALT 1139 March 1, 2017  Non-viral  Resolved  5/11/2017 Surgery     Exploratory laparotomy, tumor debulking with radical hysterectomy, bilateral salpingo-oophorectomy, radical resection of pelvic tumor with low anterior resection, en bloc pelvic peritoneum adenectomy, resection of 2 5 cm right diaphragm nodule and gastrocolic omentectomy  Final pathology with 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 and 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  Complete cytoreduction to no gross visible residual disease  6/15/2017 - 10/2017 Chemotherapy     Began chemotherapy with weekly Taxol 80mg/m2 and Carboplatin AUC 6 q3 weeks  Completed cycle 6 in October 2017  Genetic Testing     No known mutations               Patient ID: Marcelo Wade is a 69 y o  female  The patient presents today for surveillance of Stage IIIC right fallopian tube cancer and Stage IA left ovarian endometriod tumor  Her most recent  level was 2  She has been well in the interim and is without acute complaints  She denies nausea or vomiting  Her appetite is appropriate  She is voiding and moving her bowels without difficulty  She denies abdominal or pelvic pain  The patient is without vaginal bleeding or discharge  She is ambulatory  Emotionally stable  Performance status: 0  Patient desires to have her port removed  She has no long-term issues regarding her chemotherapy with neuropathy this has improved  She is overall doing very well  The following portions of the patient's history were reviewed and updated as appropriate: allergies, current medications, past family history, past medical history, past social history, past surgical history and problem list     Review of Systems   Constitutional: Negative for chills, fatigue and fever  HENT: Negative  Eyes: Negative  Respiratory: Negative  Cardiovascular: Negative  Gastrointestinal: Negative for abdominal pain, constipation, diarrhea, nausea and vomiting  Endocrine: Negative  Genitourinary: Negative for pelvic pain, vaginal bleeding, vaginal discharge and vaginal pain  Musculoskeletal: Negative  Skin: Negative  Allergic/Immunologic: Negative  Neurological: Negative  Hematological: Negative  Psychiatric/Behavioral: Negative  All other systems reviewed and are negative        Current Outpatient Medications   Medication Sig Dispense Refill    atenolol (TENORMIN) 50 mg tablet Take 50 mg by mouth daily      atorvastatin (LIPITOR) 20 mg tablet Take 20 mg by mouth      Cholecalciferol (VITAMIN D3) 2000 units TABS Take 1 tablet by mouth daily      esomeprazole (NexIUM) 20 mg capsule Take 20 mg by mouth as needed      PARoxetine (PAXIL) 10 mg tablet Take 10 mg by mouth daily  0    ranitidine (ZANTAC) 150 MG capsule Take 150 mg by mouth as needed for indigestion or heartburn       No current facility-administered medications for this visit  Objective:    Blood pressure 140/80, pulse 64, temperature 98 °F (36 7 °C), resp  rate 18, height 5' (1 524 m), weight 91 6 kg (202 lb), not currently breastfeeding  Body mass index is 39 45 kg/m²  Body surface area is 1 87 meters squared  Physical Exam   Constitutional: She is oriented to person, place, and time  She appears well-developed and well-nourished  HENT:   Head: Normocephalic and atraumatic  Eyes: EOM are normal    Neck: Normal range of motion  Neck supple  No thyromegaly present  Cardiovascular: Normal rate, regular rhythm and normal heart sounds  Pulmonary/Chest: Effort normal and breath sounds normal    Abdominal: Soft  Bowel sounds are normal    Genitourinary:   Genitourinary Comments: -Normal external female genitalia, normal Bartholin's and [place]'s glands                  -Normal midline urethral meatus  No lesions notes                  -Bladder without fullness mass or tenderness                  -Vagina without lesion or discharge No significant cystocele or rectocele noted                  -Cervix surgically absent                  -Uterus surgically absent                  -Adnexae surgically absent on left, nonpalpable on right                 - Anus without fissure of lesion   Musculoskeletal: Normal range of motion  Lymphadenopathy:     She has no cervical adenopathy  Neurological: She is alert and oriented to person, place, and time  Skin: Skin is warm and dry  Psychiatric: She has a normal mood and affect   Her behavior is normal

## 2019-03-07 ENCOUNTER — TELEPHONE (OUTPATIENT)
Dept: NON INVASIVE DIAGNOSTICS | Facility: HOSPITAL | Age: 74
End: 2019-03-07

## 2019-03-07 RX ORDER — SODIUM CHLORIDE 9 MG/ML
75 INJECTION, SOLUTION INTRAVENOUS CONTINUOUS
Status: CANCELLED | OUTPATIENT
Start: 2019-03-07

## 2019-03-08 ENCOUNTER — TELEPHONE (OUTPATIENT)
Dept: SURGERY | Facility: HOSPITAL | Age: 74
End: 2019-03-08

## 2019-03-11 ENCOUNTER — HOSPITAL ENCOUNTER (OUTPATIENT)
Dept: INTERVENTIONAL RADIOLOGY/VASCULAR | Facility: HOSPITAL | Age: 74
Discharge: HOME | End: 2019-03-11
Payer: MEDICARE

## 2019-03-11 VITALS
WEIGHT: 196.43 LBS | RESPIRATION RATE: 16 BRPM | HEIGHT: 60 IN | DIASTOLIC BLOOD PRESSURE: 72 MMHG | TEMPERATURE: 98.2 F | BODY MASS INDEX: 38.56 KG/M2 | OXYGEN SATURATION: 95 % | SYSTOLIC BLOOD PRESSURE: 130 MMHG | HEART RATE: 60 BPM

## 2019-03-11 DIAGNOSIS — Z85.44 HISTORY OF CANCER OF FALLOPIAN TUBE IN ADULTHOOD: ICD-10-CM

## 2019-03-11 DIAGNOSIS — Z85.43 HISTORY OF OVARIAN CANCER: ICD-10-CM

## 2019-03-11 LAB
ERYTHROCYTE [DISTWIDTH] IN BLOOD BY AUTOMATED COUNT: 13.9 % (ref 11.6–15.1)
HCT VFR BLD AUTO: 43.9 % (ref 34.8–46.1)
HGB BLD-MCNC: 14.3 G/DL (ref 11.5–15.4)
INR PPP: 1.07 (ref 0.86–1.17)
MCH RBC QN AUTO: 29.9 PG (ref 26.8–34.3)
MCHC RBC AUTO-ENTMCNC: 32.6 G/DL (ref 31.4–37.4)
MCV RBC AUTO: 92 FL (ref 82–98)
PLATELET # BLD AUTO: 161 THOUSANDS/UL (ref 149–390)
PMV BLD AUTO: 11.3 FL (ref 8.9–12.7)
PROTHROMBIN TIME: 13.8 SECONDS (ref 11.8–14.2)
RBC # BLD AUTO: 4.79 MILLION/UL (ref 3.81–5.12)
WBC # BLD AUTO: 5.15 THOUSAND/UL (ref 4.31–10.16)

## 2019-03-11 PROCEDURE — 36590 REMOVAL TUNNELED CV CATH: CPT | Performed by: RADIOLOGY

## 2019-03-11 PROCEDURE — 99152 MOD SED SAME PHYS/QHP 5/>YRS: CPT | Performed by: RADIOLOGY

## 2019-03-11 PROCEDURE — 77001 FLUOROGUIDE FOR VEIN DEVICE: CPT | Performed by: RADIOLOGY

## 2019-03-11 PROCEDURE — 99153 MOD SED SAME PHYS/QHP EA: CPT

## 2019-03-11 PROCEDURE — 99152 MOD SED SAME PHYS/QHP 5/>YRS: CPT

## 2019-03-11 PROCEDURE — 85610 PROTHROMBIN TIME: CPT | Performed by: RADIOLOGY

## 2019-03-11 PROCEDURE — 36590 REMOVAL TUNNELED CV CATH: CPT

## 2019-03-11 PROCEDURE — 85027 COMPLETE CBC AUTOMATED: CPT | Performed by: RADIOLOGY

## 2019-03-11 RX ORDER — DIPHENHYDRAMINE HYDROCHLORIDE 50 MG/ML
INJECTION INTRAMUSCULAR; INTRAVENOUS CODE/TRAUMA/SEDATION MEDICATION
Status: COMPLETED | OUTPATIENT
Start: 2019-03-11 | End: 2019-03-11

## 2019-03-11 RX ORDER — LIDOCAINE HYDROCHLORIDE 10 MG/ML
INJECTION, SOLUTION INFILTRATION; PERINEURAL CODE/TRAUMA/SEDATION MEDICATION
Status: COMPLETED | OUTPATIENT
Start: 2019-03-11 | End: 2019-03-11

## 2019-03-11 RX ORDER — FENTANYL CITRATE 50 UG/ML
INJECTION, SOLUTION INTRAMUSCULAR; INTRAVENOUS CODE/TRAUMA/SEDATION MEDICATION
Status: COMPLETED | OUTPATIENT
Start: 2019-03-11 | End: 2019-03-11

## 2019-03-11 RX ORDER — MIDAZOLAM HYDROCHLORIDE 1 MG/ML
INJECTION INTRAMUSCULAR; INTRAVENOUS CODE/TRAUMA/SEDATION MEDICATION
Status: COMPLETED | OUTPATIENT
Start: 2019-03-11 | End: 2019-03-11

## 2019-03-11 RX ORDER — SODIUM CHLORIDE 9 MG/ML
75 INJECTION, SOLUTION INTRAVENOUS CONTINUOUS
Status: DISCONTINUED | OUTPATIENT
Start: 2019-03-11 | End: 2019-03-15 | Stop reason: HOSPADM

## 2019-03-11 RX ADMIN — MIDAZOLAM HYDROCHLORIDE 1 MG: 1 INJECTION, SOLUTION INTRAMUSCULAR; INTRAVENOUS at 10:36

## 2019-03-11 RX ADMIN — SODIUM CHLORIDE 75 ML/HR: 0.9 INJECTION, SOLUTION INTRAVENOUS at 09:29

## 2019-03-11 RX ADMIN — MIDAZOLAM HYDROCHLORIDE 1 MG: 1 INJECTION, SOLUTION INTRAMUSCULAR; INTRAVENOUS at 10:25

## 2019-03-11 RX ADMIN — DIPHENHYDRAMINE HYDROCHLORIDE 25 MG: 50 INJECTION, SOLUTION INTRAMUSCULAR; INTRAVENOUS at 10:36

## 2019-03-11 RX ADMIN — DIPHENHYDRAMINE HYDROCHLORIDE 25 MG: 50 INJECTION, SOLUTION INTRAMUSCULAR; INTRAVENOUS at 10:26

## 2019-03-11 RX ADMIN — FENTANYL CITRATE 50 MCG: 50 INJECTION, SOLUTION INTRAMUSCULAR; INTRAVENOUS at 10:25

## 2019-03-11 RX ADMIN — LIDOCAINE HYDROCHLORIDE 10 ML: 10 INJECTION, SOLUTION INFILTRATION; PERINEURAL at 10:33

## 2019-03-11 NOTE — INTERVAL H&P NOTE
Update: (This section must be completed if the H&P was completed greater than 24 hrs to procedure or admission)    H&P reviewed  After examining the patient, I find no changed to the H&P since it had been written  Patient re-evaluated   Accept as history and physical     Keon Kauffman MD/March 11, 2019/10:19 AM

## 2019-03-11 NOTE — BRIEF OP NOTE (RAD/CATH)
IR PORT REMOVAL  Procedure Note    PATIENT NAME: Eze Lindo  : 1945  MRN: [id number]     Pre-op Diagnosis:   1  History of ovarian cancer    2  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      Post-op Diagnosis:   1  History of ovarian cancer    2  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        Surgeon:   Aditya Corbin MD  Assistants:     No qualified resident was available, Resident is only observing    Estimated Blood Loss: minimal     Findings:     Right sided port removed in sterile fashion       Specimens: none    Complications:  none    Anesthesia: Conscious sedation and Sharmaine Walsh MD     Date: 3/11/2019  Time: 11:01 AM

## 2019-03-11 NOTE — DISCHARGE INSTRUCTIONS
Implanted Venous Access Port Removal    WHAT YOU NEED TO KNOW:   An implanted venous access port is a device used to give treatments and take blood  It may also be called a central venous access device (CVAD)  The port is a small container that is placed under your skin, usually in your upper chest  A port can also be placed in your arm or abdomen  The port is attached to a catheter that enters a large vein  DISCHARGE INSTRUCTIONS:   Resume your normal diet  Small sips of flat soda will help with mild nausea  Prevent an infection:     Wash your hands often  Use soap and water  Clean your hands before and  after you care for your incision  Check your skin for infection every day  Look for redness, swelling, or fluid oozing from the incision site  Dressing may come off in 24 hours  Medical glue will peel off on its own in 5 to 10 days  You may shower 24 hours after procedure  Follow up with your healthcare provider as directed  Write down your questions so you remember to ask them during your visits  Activity:  You may return to your daily activities when the area heals  Contact Interventional Radiology at [phone] [place] PATIENTS: Contact Interventional Radiology at [phone]) Sharmaine Diaz PATIENTS: Contact Interventional Radiology at [phone]) if:     You have a fever  You have persistent nausea  Your inciscion site is red, swollen, or draining pus  You have questions or concerns about your condition or care  Seek care immediately or call 911 if:  Blood soaks through your bandage  The skin over or around your incision breaks open  Your heart is jumping or fluttering  You have a headache, blurred vision, and feel confused  You have pain in your arm, neck, shoulder, or chest     You have trouble breathing that is getting worse over time

## 2019-04-26 ENCOUNTER — APPOINTMENT (EMERGENCY)
Dept: RADIOLOGY | Facility: HOSPITAL | Age: 74
End: 2019-04-26
Payer: MEDICARE

## 2019-04-26 ENCOUNTER — APPOINTMENT (EMERGENCY)
Dept: CT IMAGING | Facility: HOSPITAL | Age: 74
End: 2019-04-26
Payer: MEDICARE

## 2019-04-26 ENCOUNTER — HOSPITAL ENCOUNTER (EMERGENCY)
Facility: HOSPITAL | Age: 74
Discharge: HOME | End: 2019-04-26
Attending: EMERGENCY MEDICINE
Payer: MEDICARE

## 2019-04-26 VITALS
HEART RATE: 72 BPM | DIASTOLIC BLOOD PRESSURE: 65 MMHG | WEIGHT: 197.75 LBS | TEMPERATURE: 98.3 F | SYSTOLIC BLOOD PRESSURE: 151 MMHG | RESPIRATION RATE: 18 BRPM | OXYGEN SATURATION: 98 % | BODY MASS INDEX: 38.3 KG/M2

## 2019-04-26 DIAGNOSIS — S80.02XA CONTUSION OF LEFT KNEE, INITIAL ENCOUNTER: ICD-10-CM

## 2019-04-26 DIAGNOSIS — S80.01XA CONTUSION OF RIGHT KNEE, INITIAL ENCOUNTER: ICD-10-CM

## 2019-04-26 DIAGNOSIS — S09.90XA INJURY OF HEAD, INITIAL ENCOUNTER: Primary | ICD-10-CM

## 2019-04-26 DIAGNOSIS — S06.0X9A CONCUSSION: ICD-10-CM

## 2019-04-26 PROCEDURE — 99284 EMERGENCY DEPT VISIT MOD MDM: CPT

## 2019-04-26 PROCEDURE — 73564 X-RAY EXAM KNEE 4 OR MORE: CPT

## 2019-04-26 PROCEDURE — 70450 CT HEAD/BRAIN W/O DYE: CPT

## 2019-04-26 PROCEDURE — 99284 EMERGENCY DEPT VISIT MOD MDM: CPT | Performed by: PHYSICIAN ASSISTANT

## 2019-04-26 RX ORDER — ONDANSETRON 4 MG/1
4 TABLET, ORALLY DISINTEGRATING ORAL EVERY 6 HOURS PRN
Qty: 15 TABLET | Refills: 0 | Status: SHIPPED | OUTPATIENT
Start: 2019-04-26 | End: 2020-01-29

## 2019-04-26 RX ORDER — TRAZODONE HYDROCHLORIDE 50 MG/1
50 TABLET ORAL
Refills: 0 | COMMUNITY
Start: 2019-02-26

## 2019-04-26 RX ORDER — IBUPROFEN 400 MG/1
400 TABLET ORAL EVERY 6 HOURS PRN
Qty: 30 TABLET | Refills: 0 | Status: SHIPPED | OUTPATIENT
Start: 2019-04-26

## 2019-05-22 ENCOUNTER — OFFICE VISIT (OUTPATIENT)
Dept: GYNECOLOGIC ONCOLOGY | Facility: CLINIC | Age: 74
End: 2019-05-22
Payer: MEDICARE

## 2019-05-22 VITALS
HEIGHT: 60 IN | HEART RATE: 60 BPM | WEIGHT: 195.5 LBS | RESPIRATION RATE: 18 BRPM | TEMPERATURE: 98.4 F | SYSTOLIC BLOOD PRESSURE: 110 MMHG | DIASTOLIC BLOOD PRESSURE: 80 MMHG | BODY MASS INDEX: 38.38 KG/M2

## 2019-05-22 DIAGNOSIS — Z85.44 HISTORY OF CANCER OF FALLOPIAN TUBE IN ADULTHOOD: Primary | ICD-10-CM

## 2019-05-22 PROCEDURE — 99214 OFFICE O/P EST MOD 30 MIN: CPT | Performed by: OBSTETRICS & GYNECOLOGY

## 2019-07-26 ENCOUNTER — TRANSCRIBE ORDERS (OUTPATIENT)
Dept: ADMINISTRATIVE | Facility: HOSPITAL | Age: 74
End: 2019-07-26

## 2019-07-26 DIAGNOSIS — Z12.39 BREAST SCREENING, UNSPECIFIED: Primary | ICD-10-CM

## 2019-07-30 ENCOUNTER — HOSPITAL ENCOUNTER (OUTPATIENT)
Dept: MAMMOGRAPHY | Facility: CLINIC | Age: 74
Discharge: HOME | End: 2019-07-30
Payer: MEDICARE

## 2019-07-30 VITALS — WEIGHT: 195 LBS | HEIGHT: 60 IN | BODY MASS INDEX: 38.28 KG/M2

## 2019-07-30 DIAGNOSIS — Z12.39 BREAST SCREENING, UNSPECIFIED: ICD-10-CM

## 2019-07-30 PROCEDURE — 77067 SCR MAMMO BI INCL CAD: CPT

## 2019-11-19 ENCOUNTER — OFFICE VISIT (OUTPATIENT)
Dept: GYNECOLOGIC ONCOLOGY | Facility: CLINIC | Age: 74
End: 2019-11-19
Payer: MEDICARE

## 2019-11-19 VITALS
WEIGHT: 202.5 LBS | HEART RATE: 72 BPM | SYSTOLIC BLOOD PRESSURE: 136 MMHG | TEMPERATURE: 98.1 F | HEIGHT: 60 IN | BODY MASS INDEX: 39.76 KG/M2 | DIASTOLIC BLOOD PRESSURE: 78 MMHG

## 2019-11-19 DIAGNOSIS — M54.40 CHRONIC BILATERAL LOW BACK PAIN WITH SCIATICA, SCIATICA LATERALITY UNSPECIFIED: ICD-10-CM

## 2019-11-19 DIAGNOSIS — Z85.44 HISTORY OF CANCER OF FALLOPIAN TUBE IN ADULTHOOD: ICD-10-CM

## 2019-11-19 DIAGNOSIS — C57.00 CARCINOMA OF FALLOPIAN TUBE, UNSPECIFIED LATERALITY (HCC): Primary | ICD-10-CM

## 2019-11-19 DIAGNOSIS — G89.29 CHRONIC BILATERAL LOW BACK PAIN WITH SCIATICA, SCIATICA LATERALITY UNSPECIFIED: ICD-10-CM

## 2019-11-19 PROBLEM — M54.9 CHRONIC BILATERAL BACK PAIN: Status: ACTIVE | Noted: 2019-11-19

## 2019-11-19 PROCEDURE — 99214 OFFICE O/P EST MOD 30 MIN: CPT | Performed by: OBSTETRICS & GYNECOLOGY

## 2019-11-19 RX ORDER — GABAPENTIN 100 MG/1
CAPSULE ORAL
Refills: 0 | COMMUNITY
Start: 2019-11-06

## 2019-11-19 NOTE — PROGRESS NOTES
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     Patient is a very pleasant 72-year-old white female with a personal history of both fallopian tube and endometrial cancer  She was last treated in 2017  She is presently in remission with a normal  and a normal exam     We will see the patient back in 6 months with repeat  at that time  Chronic bilateral back pain     Patient complains of chronic back pain she was recommended a referral to the [place] office but cannot travel that far  I will make a referral with Dr Holman Q least in Sports Medicine to at least initiate workup  Other Visit Diagnoses     Carcinoma of fallopian tube, unspecified laterality ([facility] [address] )    -  Primary    Relevant Orders                CHIEF COMPLAINT:  Surveillance for fallopian tube and endometrial cancer      Problem:  Cancer Staging  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  Staging form: Ovary, Fallopian Tube, Primary Peritoneal, AJCC 8th Edition  - Clinical stage from 5/11/2017: FIGO Stage IIIC (cT3c, cN0, cM0) - Signed by Deshaun Terry MD on 11/7/2018  - Pathologic stage from 5/11/2017: FIGO Stage IIIC (pT3c, pN0, cM0) - Signed by Deshaun Terry MD on 11/7/2018        Previous therapy:     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    5/11/2017 Initial Diagnosis     #1  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  #2  Hepatitis: AST 1478, ALT 1139 March 1, 2017  Non-viral  Resolved  5/11/2017 Surgery     Exploratory laparotomy, tumor debulking with radical hysterectomy, bilateral salpingo-oophorectomy, radical resection of pelvic tumor with low anterior resection, en bloc pelvic peritoneum adenectomy, resection of 2 5 cm right diaphragm nodule and gastrocolic omentectomy   Final pathology with 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 and 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  Complete cytoreduction to no gross visible residual disease  6/15/2017 - 10/2017 Chemotherapy     Began chemotherapy with weekly Taxol 80mg/m2 and Carboplatin AUC 6 q3 weeks  Completed cycle 6 in October 2017  Genetic Testing     No known mutations            Patient ID: Kia Hightower is a 69 y o  female  Patient presents for evaluation and surveillance for history of fallopian tube and endometrial cancer  She underwent exploratory laparotomy radical hysterectomy bilateral salpingo-oophorectomy and on block pelvic lymphadenectomy in 2017  She subsequently treated with chemotherapy with carboplatin paclitaxel  She is been without evidence of disease  She was last seen 6 months ago and in the interim has had no new complaint regarding the abdomen and pelvis  The patient has however had a long history of worsening back pain it is now difficult for her to walk  She experiences this in the lower back and radiates to both legs  The patient was referred to a back specialist in [place] but is unable to travel that far     Her  is stable at 3 5  However this was last November  The patient has not had her blood work drawn recently  The following portions of the patient's history were reviewed and updated as appropriate: allergies, current medications, past family history, past medical history, past surgical history and problem list     Review of Systems   Constitutional: Negative  HENT: Negative  Eyes: Negative  Respiratory: Negative  Cardiovascular: Negative  Gastrointestinal: Negative  Endocrine: Negative  Genitourinary: Negative  Musculoskeletal: Positive for back pain  Skin: Negative  Neurological: Negative  Hematological: Negative  Psychiatric/Behavioral: Negative          Current Outpatient Medications   Medication Sig Dispense Refill    atenolol (TENORMIN) 50 mg tablet Take 50 mg by mouth daily      atorvastatin (LIPITOR) 20 mg tablet Take 20 mg by mouth      Cholecalciferol (VITAMIN D3) 2000 units TABS Take 1 tablet by mouth daily      gabapentin (NEURONTIN) 100 mg capsule   0    PARoxetine (PAXIL) 10 mg tablet Take 10 mg by mouth daily  0    ranitidine (ZANTAC) 150 MG capsule Take 150 mg by mouth as needed for indigestion or heartburn      traZODone (DESYREL) 50 mg tablet Take 50 mg by mouth daily at bedtime  0    ibuprofen (MOTRIN) 400 mg tablet Take 1 tablet (400 mg total) by mouth every 6 (six) hours as needed for moderate pain or headaches for up to 5 days 30 tablet 0    ondansetron (ZOFRAN-ODT) 4 mg disintegrating tablet Take 1 tablet (4 mg total) by mouth every 6 (six) hours as needed for nausea or vomiting (Patient not taking: Reported on 11/19/2019) 15 tablet 0     No current facility-administered medications for this visit  Objective:    Blood pressure 136/78, pulse 72, temperature 98 1 °F (36 7 °C), temperature source Oral, height 5' 0 25" (1 53 m), weight 91 9 kg (202 lb 8 oz), not currently breastfeeding  Body mass index is 39 22 kg/m²  Body surface area is 1 88 meters squared      Physical Exam    Lab Results   Component Value Date     3 5 11/02/2018

## 2019-11-19 NOTE — LETTER
November 19, 2019     Cheli Cortez MD  [address] [facility] Male [address]    Patient: Eros Verdugo   YOB: 1945   Date of Visit: 11/19/2019       Dear Dr Sara Motta:    Thank you for referring Eros Verdugo to me for evaluation  Below are my notes for this consultation  If you have questions, please do not hesitate to call me  I look forward to following your patient along with you  Sincerely,        Patricia Raymond MD        CC: No Recipients  Patricia Raymond MD  11/19/2019 10:00 AM  Sign at close encounter  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     Patient is a very pleasant 81-year-old white female with a personal history of both fallopian tube and endometrial cancer  She was last treated in 2017  She is presently in remission with a normal  and a normal exam     We will see the patient back in 6 months with repeat  at that time  Chronic bilateral back pain     Patient complains of chronic back pain she was recommended a referral to the [place] office but cannot travel that far  I will make a referral with Dr Holman Q least in Sports Medicine to at least initiate workup             Other Visit Diagnoses     Carcinoma of fallopian tube, unspecified laterality ([facility] [address] )    -  Primary    Relevant Orders                CHIEF COMPLAINT:  Surveillance for fallopian tube and endometrial cancer      Problem:  Cancer Staging  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  Staging form: Ovary, Fallopian Tube, Primary Peritoneal, AJCC 8th Edition  - Clinical stage from 5/11/2017: FIGO Stage IIIC (cT3c, cN0, cM0) - Signed by Patricia Raymond MD on 11/7/2018  - Pathologic stage from 5/11/2017: FIGO Stage IIIC (pT3c, pN0, cM0) - Signed by Patricia Raymond MD on 11/7/2018        Previous therapy:     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    5/11/2017 Initial Diagnosis #1  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  #2  Hepatitis: AST 1478, ALT 1139 March 1, 2017  Non-viral  Resolved  5/11/2017 Surgery     Exploratory laparotomy, tumor debulking with radical hysterectomy, bilateral salpingo-oophorectomy, radical resection of pelvic tumor with low anterior resection, en bloc pelvic peritoneum adenectomy, resection of 2 5 cm right diaphragm nodule and gastrocolic omentectomy  Final pathology with 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 and 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  Complete cytoreduction to no gross visible residual disease  6/15/2017 - 10/2017 Chemotherapy     Began chemotherapy with weekly Taxol 80mg/m2 and Carboplatin AUC 6 q3 weeks  Completed cycle 6 in October 2017  Genetic Testing     No known mutations            Patient ID: Kassandra Black is a 69 y o  female  Patient presents for evaluation and surveillance for history of fallopian tube and endometrial cancer  She underwent exploratory laparotomy radical hysterectomy bilateral salpingo-oophorectomy and on block pelvic lymphadenectomy in 2017  She subsequently treated with chemotherapy with carboplatin paclitaxel  She is been without evidence of disease  She was last seen 6 months ago and in the interim has had no new complaint regarding the abdomen and pelvis  The patient has however had a long history of worsening back pain it is now difficult for her to walk  She experiences this in the lower back and radiates to both legs  The patient was referred to a back specialist in [place] but is unable to travel that far     Her  is stable at 3 5  However this was last November  The patient has not had her blood work drawn recently        The following portions of the patient's history were reviewed and updated as appropriate: allergies, current medications, past family history, past medical history, past surgical history and problem list     Review of Systems   Constitutional: Negative  HENT: Negative  Eyes: Negative  Respiratory: Negative  Cardiovascular: Negative  Gastrointestinal: Negative  Endocrine: Negative  Genitourinary: Negative  Musculoskeletal: Positive for back pain  Skin: Negative  Neurological: Negative  Hematological: Negative  Psychiatric/Behavioral: Negative  Current Outpatient Medications   Medication Sig Dispense Refill    atenolol (TENORMIN) 50 mg tablet Take 50 mg by mouth daily      atorvastatin (LIPITOR) 20 mg tablet Take 20 mg by mouth      Cholecalciferol (VITAMIN D3) 2000 units TABS Take 1 tablet by mouth daily      gabapentin (NEURONTIN) 100 mg capsule   0    PARoxetine (PAXIL) 10 mg tablet Take 10 mg by mouth daily  0    ranitidine (ZANTAC) 150 MG capsule Take 150 mg by mouth as needed for indigestion or heartburn      traZODone (DESYREL) 50 mg tablet Take 50 mg by mouth daily at bedtime  0    ibuprofen (MOTRIN) 400 mg tablet Take 1 tablet (400 mg total) by mouth every 6 (six) hours as needed for moderate pain or headaches for up to 5 days 30 tablet 0    ondansetron (ZOFRAN-ODT) 4 mg disintegrating tablet Take 1 tablet (4 mg total) by mouth every 6 (six) hours as needed for nausea or vomiting (Patient not taking: Reported on 11/19/2019) 15 tablet 0     No current facility-administered medications for this visit  Objective:    Blood pressure 136/78, pulse 72, temperature 98 1 °F (36 7 °C), temperature source Oral, height 5' 0 25" (1 53 m), weight 91 9 kg (202 lb 8 oz), not currently breastfeeding  Body mass index is 39 22 kg/m²  Body surface area is 1 88 meters squared      Physical Exam    Lab Results   Component Value Date     3 5 11/02/2018

## 2019-11-19 NOTE — ASSESSMENT & PLAN NOTE
Patient is a very pleasant 79-year-old white female with a personal history of both fallopian tube and endometrial cancer  She was last treated in 2017  She is presently in remission with a normal  and a normal exam   Patient is encouraged to repeat her  now  We will see the patient back in 6 months with repeat  at that time

## 2019-11-19 NOTE — ASSESSMENT & PLAN NOTE
Patient complains of chronic back pain she was recommended a referral to the [facility] office but cannot travel that far  I will make a referral with Dr Holman Q least in Sports Medicine to at least initiate workup

## 2019-11-27 ENCOUNTER — TRANSCRIBE ORDERS (OUTPATIENT)
Dept: ADMINISTRATIVE | Facility: HOSPITAL | Age: 74
End: 2019-11-27

## 2019-11-27 DIAGNOSIS — F02.80 ALZHEIMER'S DEMENTIA WITHOUT BEHAVIORAL DISTURBANCE, UNSPECIFIED TIMING OF DEMENTIA ONSET (HCC): Primary | ICD-10-CM

## 2019-11-27 DIAGNOSIS — G30.9 ALZHEIMER'S DEMENTIA WITHOUT BEHAVIORAL DISTURBANCE, UNSPECIFIED TIMING OF DEMENTIA ONSET (HCC): Primary | ICD-10-CM

## 2019-12-12 ENCOUNTER — OFFICE VISIT (OUTPATIENT)
Dept: OBGYN CLINIC | Facility: CLINIC | Age: 74
End: 2019-12-12
Payer: MEDICARE

## 2019-12-12 VITALS
SYSTOLIC BLOOD PRESSURE: 124 MMHG | BODY MASS INDEX: 39.66 KG/M2 | WEIGHT: 202 LBS | DIASTOLIC BLOOD PRESSURE: 76 MMHG | HEART RATE: 56 BPM | HEIGHT: 60 IN

## 2019-12-12 DIAGNOSIS — M51.26 PROTRUSION OF LUMBAR INTERVERTEBRAL DISC: ICD-10-CM

## 2019-12-12 DIAGNOSIS — M47.816 FACET ARTHROPATHY, LUMBAR: ICD-10-CM

## 2019-12-12 DIAGNOSIS — M48.00 CENTRAL STENOSIS OF SPINAL CANAL: Primary | ICD-10-CM

## 2019-12-12 DIAGNOSIS — M48.061 FORAMINAL STENOSIS OF LUMBAR REGION: ICD-10-CM

## 2019-12-12 DIAGNOSIS — M54.16 RADICULOPATHY, LUMBAR REGION: ICD-10-CM

## 2019-12-12 DIAGNOSIS — M62.830 LUMBAR PARASPINAL MUSCLE SPASM: ICD-10-CM

## 2019-12-12 PROCEDURE — 99203 OFFICE O/P NEW LOW 30 MIN: CPT | Performed by: FAMILY MEDICINE

## 2019-12-12 RX ORDER — CYCLOBENZAPRINE HCL 10 MG
10 TABLET ORAL
Qty: 30 TABLET | Refills: 0 | Status: SHIPPED | OUTPATIENT
Start: 2019-12-12

## 2019-12-12 NOTE — PROGRESS NOTES
Assessment/Plan:  Assessment/Plan   Diagnoses and all orders for this visit:    Central stenosis of spinal canal  -     Ambulatory referral to Sports Medicine  -     Ambulatory referral to Pain Management; Future    Protrusion of lumbar intervertebral disc  -     Ambulatory referral to Pain Management; Future    Facet arthropathy, lumbar  -     Ambulatory referral to Pain Management; Future    Foraminal stenosis of lumbar region  -     Ambulatory referral to Pain Management; Future    Radiculopathy, lumbar region  -     Ambulatory referral to Pain Management; Future    Lumbar paraspinal muscle spasm  -     cyclobenzaprine (FLEXERIL) 10 mg tablet; Take 1 tablet (10 mg total) by mouth daily at bedtime      80-year-old female with low back and right lower extremity pain more than few years duration  Discussed with patient MRI results, physical exam, impression and plan  MRI of lumbar spine September 2017 is noted for lumbar spine multilevel degenerative disc disease, disc protrusion, and foraminal narrowing, mild to moderate canal stenosis  Physical exam noted for lumbar spine bilateral paraspinal hypertonicity  She has limited motion with right and left rotation  She has normal strength and sensation both lower extremities and decreased patellar reflex bilaterally  There is no groin pain with OPAL and FADDIR maneuvers of the hips  There is positive straight leg raise on the right  Clinical impression she is symptomatic from lumbar spine pathology causing radicular symptoms in the lower extremities  I discussed regimen of supplements, muscle relaxers, and referral to pain management, as she may need invasive treatment  She is to start taking tumeric 500 mg twice daily and Osteo Bi-Flex triple strength twice daily  She may take cyclobenzaprine 10 mg at night and not before driving  She will follow up with me after seeing pain management  Subjective:   Patient ID: Beba Wen is a 69 y o  female    Chief Complaint   Patient presents with    Lower Back - Pain       81-year-old female presents for evaluation of low back and right lower extremity pain more than few years duration  She denies any trauma or inciting event  She had MRI of lumbar spine done 2 years ago which was noted for central and foraminal stenosis, facet arthropathy, and degenerative disc disease  She reports that for past 6 months pain has been worsening  She has pain described as localized to the lumbosacral aspect of the spine but worse on the right, intermittent, achy and sometimes sharp, radiating to right lower extremity to the foot, associated numbness and tingling both lower extremities, worse with standing and ambulation, and improved with rest   She denies any bowel or bladder incontinence  She reports that she is unable to stand for more than 5 minutes without needing to take a break, as it causes numbness and throbbing in the right lower extremity  She has been ambulating with a cane intermittently  He has been on gabapentin  She also has allergies to multiple medications  Back Pain   This is a chronic problem  The current episode started more than 1 year ago  The problem occurs intermittently  The problem has been gradually worsening  Associated symptoms include arthralgias and numbness  Pertinent negatives include no abdominal pain, chest pain, chills, fever, joint swelling, rash, sore throat or weakness  The symptoms are aggravated by standing and walking  She has tried rest and acetaminophen (Gabapentin) for the symptoms  The treatment provided mild relief  The following portions of the patient's history were reviewed and updated as appropriate: She  has a past medical history of Arthritis, Asthma, Edema, GERD (gastroesophageal reflux disease), History of hepatitis C, Hyperlipidemia, Hypertension, Lower back pain, Ovarian cancer ([facility] [address] ) (01/01/2017), Pelvic mass, and Sleep apnea    She  has a past surgical history that includes Cholecystectomy; Tonsillectomy; Appendectomy; pr lap,diagnostic abdomen (N/A, 5/11/2017); pr part removal colon w anastomosis (N/A, 5/11/2017); DEBULKING TUMOR/ CYTOREDUCTION (N/A, 5/11/2017); Hysterectomy; ERCP (N/A, 4/9/2018); pr colonoscopy flx dx w/collj spec when pfrmd (N/A, 7/18/2018); Colonoscopy; and IR port Removal (3/11/2019)  Her family history includes Heart failure in her father and mother; No Known Problems in her daughter, daughter, maternal aunt, maternal grandmother, and paternal grandmother  She  reports that she has never smoked  She has never used smokeless tobacco  She reports that she does not drink alcohol or use drugs  She is allergic to shellfish allergy; morphine; oxycodone; oxycodone-acetaminophen; and tramadol       Review of Systems   Constitutional: Negative for chills and fever  HENT: Negative for sore throat  Eyes: Negative for visual disturbance  Respiratory: Negative for shortness of breath  Cardiovascular: Negative for chest pain  Gastrointestinal: Negative for abdominal pain  Genitourinary: Negative for flank pain  Musculoskeletal: Positive for arthralgias and back pain  Negative for joint swelling  Skin: Negative for rash and wound  Neurological: Positive for numbness  Negative for weakness  Hematological: Does not bruise/bleed easily  Psychiatric/Behavioral: Negative for self-injury         Objective:  Vitals:    12/12/19 0956   BP: 124/76   Pulse: 56   Weight: 91 6 kg (202 lb)   Height: 5' 0 25" (1 53 m)     Right Ankle Exam     Muscle Strength   Dorsiflexion:  5/5  Plantar flexion:  5/5      Left Ankle Exam     Muscle Strength   Dorsiflexion:  5/5   Plantar flexion:  5/5       Right Hip Exam     Muscle Strength   Flexion: 5/5     Tests   OPAL: negative    Comments:  Negative FADDIR      Left Hip Exam     Muscle Strength   Flexion: 5/5     Tests   OPAL: negative    Comments:  Negative FADDIR      Back Exam     Tenderness   Back tenderness location: Lumbar spine bilateral paraspinal hypertonicity and tenderness  Range of Motion   Lateral bend right: normal   Lateral bend left: normal   Rotation right: abnormal   Rotation left: abnormal     Muscle Strength   Right Quadriceps:  5/5   Left Quadriceps:  5/5     Tests   Straight leg raise right: positive  Straight leg raise left: negative    Reflexes   Patellar: Hyporeflexic    Other   Sensation: normal          Strength/Myotome Testing     Left Ankle/Foot   Dorsiflexion: 5  Plantar flexion: 5    Right Ankle/Foot   Dorsiflexion: 5  Plantar flexion: 5      Physical Exam   Constitutional: She is oriented to person, place, and time  She appears well-developed  No distress  HENT:   Head: Normocephalic  Eyes: Conjunctivae are normal    Neck: No tracheal deviation present  Cardiovascular: Normal rate  Pulmonary/Chest: Effort normal  No respiratory distress  Abdominal: She exhibits no distension  Neurological: She is alert and oriented to person, place, and time  Skin: Skin is warm and dry  Psychiatric: She has a normal mood and affect  Her behavior is normal    Nursing note and vitals reviewed  I have personally reviewed pertinent films in PACS and my interpretation is Lumbar spine degenerative disease, disc protrusion, and foraminal narrowing

## 2019-12-12 NOTE — LETTER
December 12, 2019     Joshua Alexander MD  [address]    Patient: Yuni Corbin   YOB: 1945   Date of Visit: 12/12/2019       Dear Dr Susan Yan: Thank you for referring Yuni Corbin to me for evaluation  Below are my notes for this consultation  If you have questions, please do not hesitate to call me  I look forward to following your patient along with you  Sincerely,        [employer], DO        CC: No Recipients  [employer], DO  12/12/2019  6:07 PM  Sign at close encounter  Assessment/Plan:  Assessment/Plan   Diagnoses and all orders for this visit:    Central stenosis of spinal canal  -     Ambulatory referral to Sports Medicine  -     Ambulatory referral to Pain Management; Future    Protrusion of lumbar intervertebral disc  -     Ambulatory referral to Pain Management; Future    Facet arthropathy, lumbar  -     Ambulatory referral to Pain Management; Future    Foraminal stenosis of lumbar region  -     Ambulatory referral to Pain Management; Future    Radiculopathy, lumbar region  -     Ambulatory referral to Pain Management; Future    Lumbar paraspinal muscle spasm  -     cyclobenzaprine (FLEXERIL) 10 mg tablet; Take 1 tablet (10 mg total) by mouth daily at bedtime      72-year-old female with low back and right lower extremity pain more than few years duration  Discussed with patient MRI results, physical exam, impression and plan  MRI of lumbar spine September 2017 is noted for lumbar spine multilevel degenerative disc disease, disc protrusion, and foraminal narrowing, mild to moderate canal stenosis  Physical exam noted for lumbar spine bilateral paraspinal hypertonicity  She has limited motion with right and left rotation  She has normal strength and sensation both lower extremities and decreased patellar reflex bilaterally  There is no groin pain with OPAL and FADDIR maneuvers of the hips    There is positive straight leg raise on the right  Clinical impression she is symptomatic from lumbar spine pathology causing radicular symptoms in the lower extremities  I discussed regimen of supplements, muscle relaxers, and referral to pain management, as she may need invasive treatment  She is to start taking tumeric 500 mg twice daily and Osteo Bi-Flex triple strength twice daily  She may take cyclobenzaprine 10 mg at night and not before driving  She will follow up with me after seeing pain management  Subjective:   Patient ID: Kia Hightower is a 69 y o  female  Chief Complaint   Patient presents with    Lower Back - Pain       72-year-old female presents for evaluation of low back and right lower extremity pain more than few years duration  She denies any trauma or inciting event  She had MRI of lumbar spine done 2 years ago which was noted for central and foraminal stenosis, facet arthropathy, and degenerative disc disease  She reports that for past 6 months pain has been worsening  She has pain described as localized to the lumbosacral aspect of the spine but worse on the right, intermittent, achy and sometimes sharp, radiating to right lower extremity to the foot, associated numbness and tingling both lower extremities, worse with standing and ambulation, and improved with rest   She denies any bowel or bladder incontinence  She reports that she is unable to stand for more than 5 minutes without needing to take a break, as it causes numbness and throbbing in the right lower extremity  She has been ambulating with a cane intermittently  He has been on gabapentin  She also has allergies to multiple medications  Back Pain   This is a chronic problem  The current episode started more than 1 year ago  The problem occurs intermittently  The problem has been gradually worsening  Associated symptoms include arthralgias and numbness   Pertinent negatives include no abdominal pain, chest pain, chills, fever, joint swelling, rash, sore throat or weakness  The symptoms are aggravated by standing and walking  She has tried rest and acetaminophen (Gabapentin) for the symptoms  The treatment provided mild relief  The following portions of the patient's history were reviewed and updated as appropriate: She  has a past medical history of Arthritis, Asthma, Edema, GERD (gastroesophageal reflux disease), History of hepatitis C, Hyperlipidemia, Hypertension, Lower back pain, Ovarian cancer ([facility] [address] ) (01/01/2017), Pelvic mass, and Sleep apnea  She  has a past surgical history that includes Cholecystectomy; Tonsillectomy; Appendectomy; pr lap,diagnostic abdomen (N/A, 5/11/2017); pr part removal colon w anastomosis (N/A, 5/11/2017); DEBULKING TUMOR/ CYTOREDUCTION (N/A, 5/11/2017); Hysterectomy; ERCP (N/A, 4/9/2018); pr colonoscopy flx dx w/collj spec when pfrmd (N/A, 7/18/2018); Colonoscopy; and IR port Removal (3/11/2019)  Her family history includes Heart failure in her father and mother; No Known Problems in her daughter, daughter, maternal aunt, maternal grandmother, and paternal grandmother  She  reports that she has never smoked  She has never used smokeless tobacco  She reports that she does not drink alcohol or use drugs  She is allergic to shellfish allergy; morphine; oxycodone; oxycodone-acetaminophen; and tramadol       Review of Systems   Constitutional: Negative for chills and fever  HENT: Negative for sore throat  Eyes: Negative for visual disturbance  Respiratory: Negative for shortness of breath  Cardiovascular: Negative for chest pain  Gastrointestinal: Negative for abdominal pain  Genitourinary: Negative for flank pain  Musculoskeletal: Positive for arthralgias and back pain  Negative for joint swelling  Skin: Negative for rash and wound  Neurological: Positive for numbness  Negative for weakness  Hematological: Does not bruise/bleed easily  Psychiatric/Behavioral: Negative for self-injury  Objective:  Vitals:    12/12/19 0956   BP: 124/76   Pulse: 56   Weight: 91 6 kg (202 lb)   Height: 5' 0 25" (1 53 m)     Right Ankle Exam     Muscle Strength   Dorsiflexion:  5/5  Plantar flexion:  5/5      Left Ankle Exam     Muscle Strength   Dorsiflexion:  5/5   Plantar flexion:  5/5       Right Hip Exam     Muscle Strength   Flexion: 5/5     Tests   OPAL: negative    Comments:  Negative FADDIR      Left Hip Exam     Muscle Strength   Flexion: 5/5     Tests   OPAL: negative    Comments:  Negative FADDIR      Back Exam     Tenderness   Back tenderness location: Lumbar spine bilateral paraspinal hypertonicity and tenderness  Range of Motion   Lateral bend right: normal   Lateral bend left: normal   Rotation right: abnormal   Rotation left: abnormal     Muscle Strength   Right Quadriceps:  5/5   Left Quadriceps:  5/5     Tests   Straight leg raise right: positive  Straight leg raise left: negative    Reflexes   Patellar: Hyporeflexic    Other   Sensation: normal          Strength/Myotome Testing     Left Ankle/Foot   Dorsiflexion: 5  Plantar flexion: 5    Right Ankle/Foot   Dorsiflexion: 5  Plantar flexion: 5      Physical Exam   Constitutional: She is oriented to person, place, and time  She appears well-developed  No distress  HENT:   Head: Normocephalic  Eyes: Conjunctivae are normal    Neck: No tracheal deviation present  Cardiovascular: Normal rate  Pulmonary/Chest: Effort normal  No respiratory distress  Abdominal: She exhibits no distension  Neurological: She is alert and oriented to person, place, and time  Skin: Skin is warm and dry  Psychiatric: She has a normal mood and affect  Her behavior is normal    Nursing note and vitals reviewed  I have personally reviewed pertinent films in PACS and my interpretation is Lumbar spine degenerative disease, disc protrusion, and foraminal narrowing

## 2020-01-11 ENCOUNTER — HOSPITAL ENCOUNTER (OUTPATIENT)
Dept: MRI IMAGING | Facility: HOSPITAL | Age: 75
Discharge: HOME | End: 2020-01-11
Payer: MEDICARE

## 2020-01-11 DIAGNOSIS — G30.9 ALZHEIMER'S DEMENTIA WITHOUT BEHAVIORAL DISTURBANCE, UNSPECIFIED TIMING OF DEMENTIA ONSET (HCC): ICD-10-CM

## 2020-01-11 DIAGNOSIS — F02.80 ALZHEIMER'S DEMENTIA WITHOUT BEHAVIORAL DISTURBANCE, UNSPECIFIED TIMING OF DEMENTIA ONSET (HCC): ICD-10-CM

## 2020-01-11 PROCEDURE — 70551 MRI BRAIN STEM W/O DYE: CPT

## 2020-01-29 ENCOUNTER — CONSULT (OUTPATIENT)
Dept: PAIN MEDICINE | Facility: CLINIC | Age: 75
End: 2020-01-29
Payer: MEDICARE

## 2020-01-29 VITALS
DIASTOLIC BLOOD PRESSURE: 74 MMHG | HEART RATE: 67 BPM | SYSTOLIC BLOOD PRESSURE: 134 MMHG | HEIGHT: 60 IN | RESPIRATION RATE: 18 BRPM | WEIGHT: 200 LBS | BODY MASS INDEX: 39.27 KG/M2

## 2020-01-29 DIAGNOSIS — M47.816 FACET ARTHROPATHY, LUMBAR: ICD-10-CM

## 2020-01-29 DIAGNOSIS — M54.16 RADICULOPATHY, LUMBAR REGION: ICD-10-CM

## 2020-01-29 DIAGNOSIS — M48.061 FORAMINAL STENOSIS OF LUMBAR REGION: ICD-10-CM

## 2020-01-29 DIAGNOSIS — M48.00 CENTRAL STENOSIS OF SPINAL CANAL: ICD-10-CM

## 2020-01-29 DIAGNOSIS — M51.26 PROTRUSION OF LUMBAR INTERVERTEBRAL DISC: ICD-10-CM

## 2020-01-29 PROCEDURE — 99204 OFFICE O/P NEW MOD 45 MIN: CPT | Performed by: ANESTHESIOLOGY

## 2020-01-29 NOTE — PROGRESS NOTES
Assessment  1  Central stenosis of spinal canal  Ambulatory referral to Pain Management   2  Protrusion of lumbar intervertebral disc  Ambulatory referral to Pain Management   3  Facet arthropathy, lumbar  Ambulatory referral to Pain Management   4  Foraminal stenosis of lumbar region  Ambulatory referral to Pain Management   5  Radiculopathy, lumbar region  Ambulatory referral to Pain Management       Plan  This is a 30-year-old female who presents today for initial consultation regarding low back pain and bilateral leg pain which is multifactorial in nature  On physical examination, there is no gross motor deficits appreciated  Sensory testing is intact  MRI of the lumbosacral spine demonstrates multilevel degenerative disc disease with foraminal stenosis  The patient's pain persists despite time, relative rest, activity modification and physical therapy  I recommend a [bilateral L5] transforaminal epidural steroid injection to diminish any inflammatory component of the pain  We will initially use a transforaminal approach to better concentrate the steroid along the affected nerve root  The injection may need to be repeated based on the degree of pain relief following the initial injection  In the office today, we reviewed the nature of the patient's pathology in depth using diagrams and models  We discussed the approach we would use for the epidural steroid injection and provided literature for home review  The patient understands the risks associated with the procedure including bleeding, infection, tissue injury, allergic reaction and paralysis and provided written and verbal consent in the office today  My impressions and treatment recommendations were discussed in detail with the patient who verbalized understanding and had no further questions  Discharge instructions were provided  I personally saw and examined the patient and I agree with the above discussed plan of care      History of Present Illness    Alcario Spindle Clarise Merlin is a 70 y o  female who presents today for initial consultation regarding low back pain and bilateral leg pain  Of note, patient reports severe pain which she rates 8/10  Her pain is nearly constant, worse in the evening  Patient further describes pain as sharp, throbbing in nature  She reports pain which is aggravated with prolonged standing, bending, walking and exercise  Patient uses heat and ice without much relief of pain  She has had physical therapy as well without any relief  She denies bladder and or bowel issues  She denies fever or chills  Prior MRI of the lumbosacral spine demonstrates multilevel degenerative disc disease with foraminal stenosis  I have personally reviewed and/or updated the patient's past medical history, past surgical history, family history, social history, current medications, allergies, and vital signs today  Review of Systems   Constitutional: Negative for fever and unexpected weight change  HENT: Positive for hearing loss  Negative for trouble swallowing  Eyes: Negative for visual disturbance  Respiratory: Negative for shortness of breath and wheezing  Cardiovascular: Negative for chest pain and palpitations  Gastrointestinal: Negative for constipation, diarrhea, nausea and vomiting  Endocrine: Negative for cold intolerance, heat intolerance and polydipsia  Genitourinary: Negative for difficulty urinating and frequency  Musculoskeletal: Positive for arthralgias, back pain and myalgias  Negative for gait problem and joint swelling  Skin: Negative for rash  Neurological: Positive for numbness  Negative for dizziness, seizures, syncope, weakness and headaches  Hematological: Does not bruise/bleed easily  Psychiatric/Behavioral: Negative for dysphoric mood  The patient is nervous/anxious  Depression   All other systems reviewed and are negative        Patient Active Problem List   Diagnosis    Patient refuses to disclose advance directive information    Chronic constipation    Hyperlipidemia    Disorder of intervertebral disc    HERNÁN (obstructive sleep apnea)    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    Breast cancer screening    Obesity (BMI 30-39  9)    Essential hypertension    Mild intermittent asthma without complication    Chronic GERD    Mood disturbance    Chronic pain disorder    Chronic rhinitis    Change in bowel habits    Abdominal pain    Choledocholithiasis    Elevated LFTs    Screen for colon cancer    Neuropathy    Encounter for follow-up surveillance of ovarian cancer    History of ovarian cancer    Encounter for follow-up surveillance of cancer of female genital tract organ    Chronic bilateral back pain       Past Medical History:   Diagnosis Date    Arthritis     Asthma     Edema     GERD (gastroesophageal reflux disease)     History of hepatitis C     treated    Hyperlipidemia     Hypertension     Lower back pain     Ovarian cancer ([facility] [address] ) 01/01/2017    Pelvic mass     Sleep apnea     uses cpap       Past Surgical History:   Procedure Laterality Date    APPENDECTOMY      CHOLECYSTECTOMY      COLONOSCOPY      DEBULKING TUMOR/ CYTOREDUCTION N/A 5/11/2017    Procedure: DEBULKING TUMOR/ CYTOREDUCTION: RADICAL ABDOMINAL HYSTERECTOMY, BSO, RADICAL RESECTION OF PELVIC TUMOR WITH PELVIC PERITONECTOMY, RESECTION OF RIGHT DIAPHRAGM TUMOR IMPLANT, OMENTECTOMY;  Surgeon: Remigio Curtis MD;  [facility];  Service:     ERCP N/A 4/9/2018    Procedure: ENDOSCOPIC RETROGRADE CHOLANGIOPANCREATOGRAPHY (ERCP); Surgeon: Perla Ng MD;  Location: [facility];  Service: Gastroenterology    HYSTERECTOMY      IR PORT REMOVAL  3/11/2019    NM COLONOSCOPY FLX DX W/COLLJ SPEC WHEN PFRMD N/A 7/18/2018    Procedure: COLONOSCOPY;  Surgeon: Perla Ng MD;  Location: [facility];   Service: Gastroenterology    NM LAP,DIAGNOSTIC ABDOMEN N/A 5/11/2017 Procedure: LAPAROSCOPY DIAGNOSTIC;  Surgeon: Remigio Curtis MD;  [facility];  Service: Gynecology Oncology    SD PART REMOVAL COLON W ANASTOMOSIS N/A 5/11/2017    Procedure: Splenic flexure mobilization Low anterior resection with EEA 29 coloproctostomy low pelvic anastomosis to 8cm Intraop SPY Fluorescence angiography Intraop flexible sigmoidoscopy;  Surgeon: Josie Armando MD;  [facility];  Service: Colorectal    TONSILLECTOMY         Family History   Problem Relation Age of Onset    Heart failure Mother     Heart failure Father     No Known Problems Daughter     No Known Problems Maternal Grandmother     No Known Problems Paternal Grandmother     No Known Problems Daughter     No Known Problems Maternal Aunt        Social History     Occupational History    Not on file   Tobacco Use    Smoking status: Never Smoker    Smokeless tobacco: Never Used   Substance and Sexual Activity    Alcohol use: No    Drug use: No    Sexual activity: Not on file       Current Outpatient Medications on File Prior to Visit   Medication Sig    atenolol (TENORMIN) 50 mg tablet Take 50 mg by mouth daily    atorvastatin (LIPITOR) 20 mg tablet Take 20 mg by mouth    Cholecalciferol (VITAMIN D3) 2000 units TABS Take 1 tablet by mouth daily    cyclobenzaprine (FLEXERIL) 10 mg tablet Take 1 tablet (10 mg total) by mouth daily at bedtime    gabapentin (NEURONTIN) 100 mg capsule     ibuprofen (MOTRIN) 400 mg tablet Take 1 tablet (400 mg total) by mouth every 6 (six) hours as needed for moderate pain or headaches for up to 5 days    PARoxetine (PAXIL) 10 mg tablet Take 10 mg by mouth daily    traZODone (DESYREL) 50 mg tablet Take 50 mg by mouth daily at bedtime    [DISCONTINUED] ondansetron (ZOFRAN-ODT) 4 mg disintegrating tablet Take 1 tablet (4 mg total) by mouth every 6 (six) hours as needed for nausea or vomiting (Patient not taking: Reported on 11/19/2019)    [DISCONTINUED] ranitidine (ZANTAC) 150 MG capsule Take 150 mg by mouth as needed for indigestion or heartburn     No current facility-administered medications on file prior to visit  Allergies   Allergen Reactions    Shellfish Allergy Other (See Comments)     Pt states that her lips swell with all shellfish except for shrimp    Morphine GI Intolerance    Oxycodone GI Intolerance     But patient still takes prn    Oxycodone-Acetaminophen GI Intolerance     Patient takes oxycodone daily prn for severe back pain    Tramadol GI Intolerance     Takes in small amounts       Physical Exam    /74   Pulse 67   Resp 18   Ht 5' (1 524 m)   Wt 90 7 kg (200 lb)   BMI 39 06 kg/m²     Constitutional: normal, well developed, well nourished, alert, in no distress and non-toxic and no overt pain behavior    Eyes: anicteric  HEENT: grossly intact  Neck: supple, symmetric, trachea midline and no masses   Pulmonary:even and unlabored  Cardiovascular:No edema or pitting edema present  Skin:Normal without rashes or lesions and well hydrated  Psychiatric:Mood and affect appropriate  Neurologic:Cranial Nerves II-XII grossly intact  Musculoskeletal:normal    Lumbar Spine Exam    Appearance:  Normal lordosis  Palpation/Tenderness:  left lumbar paraspinal tenderness  right lumbar paraspinal tenderness  Sensory:  no sensory deficits noted  Range of Motion:  Extension:  Minimally limited  with pain  Motor Strength:  Left foot dorsiflexion:  5/5  Left foot plantar flexion:  5/5  Right foot dorsiflexion:  5/5  Right foot plantar flexion:  5/5  Reflexes:  Left Patellar:  2+   Right Patellar:  2+     Imaging

## 2020-02-13 ENCOUNTER — TELEPHONE (OUTPATIENT)
Dept: INTERNAL MEDICINE CLINIC | Facility: CLINIC | Age: 75
End: 2020-02-13

## 2020-02-18 ENCOUNTER — HOSPITAL ENCOUNTER (OUTPATIENT)
Dept: RADIOLOGY | Facility: CLINIC | Age: 75
Discharge: HOME | End: 2020-02-18
Attending: ANESTHESIOLOGY | Admitting: ANESTHESIOLOGY
Payer: MEDICARE

## 2020-02-18 VITALS
HEART RATE: 73 BPM | TEMPERATURE: 97.7 F | OXYGEN SATURATION: 98 % | DIASTOLIC BLOOD PRESSURE: 69 MMHG | SYSTOLIC BLOOD PRESSURE: 129 MMHG | RESPIRATION RATE: 20 BRPM

## 2020-02-18 DIAGNOSIS — M48.061 FORAMINAL STENOSIS OF LUMBAR REGION: ICD-10-CM

## 2020-02-18 DIAGNOSIS — M54.16 RADICULOPATHY, LUMBAR REGION: ICD-10-CM

## 2020-02-18 PROCEDURE — 64483 NJX AA&/STRD TFRM EPI L/S 1: CPT | Performed by: ANESTHESIOLOGY

## 2020-02-18 PROCEDURE — A9579 GAD-BASE MR CONTRAST NOS,1ML: HCPCS | Performed by: ANESTHESIOLOGY

## 2020-02-18 RX ORDER — BUPIVACAINE HCL/PF 2.5 MG/ML
5 VIAL (ML) INJECTION ONCE
Status: COMPLETED | OUTPATIENT
Start: 2020-02-18 | End: 2020-02-18

## 2020-02-18 RX ORDER — PAPAVERINE HCL 150 MG
20 CAPSULE, EXTENDED RELEASE ORAL ONCE
Status: COMPLETED | OUTPATIENT
Start: 2020-02-18 | End: 2020-02-18

## 2020-02-18 RX ORDER — LIDOCAINE HYDROCHLORIDE 10 MG/ML
5 INJECTION, SOLUTION EPIDURAL; INFILTRATION; INTRACAUDAL; PERINEURAL ONCE
Status: COMPLETED | OUTPATIENT
Start: 2020-02-18 | End: 2020-02-18

## 2020-02-18 RX ADMIN — GADOPENTETATE DIMEGLUMINE 2 ML: 469.01 INJECTION INTRAVENOUS at 09:33

## 2020-02-18 RX ADMIN — Medication 5 ML: at 09:33

## 2020-02-18 RX ADMIN — DEXAMETHASONE SODIUM PHOSPHATE 20 MG: 10 INJECTION, SOLUTION INTRAMUSCULAR; INTRAVENOUS at 09:34

## 2020-02-18 RX ADMIN — LIDOCAINE HYDROCHLORIDE 5 ML: 10 INJECTION, SOLUTION EPIDURAL; INFILTRATION; INTRACAUDAL; PERINEURAL at 09:34

## 2020-02-18 NOTE — DISCHARGE INSTR - LAB
Epidural Steroid Injection   WHAT YOU NEED TO KNOW:   An epidural steroid injection (KISHORE) is a procedure to inject steroid medicine into the epidural space  The epidural space is between your spinal cord and vertebrae  Steroids reduce inflammation and fluid buildup in your spine that may be causing pain  You may be given pain medicine along with the steroids  ACTIVITY  · Do not drive or operate machinery today  · No strenuous activity today - bending, lifting, etc   · You may resume normal activites starting tomorrow - start slowly and as tolerated  · You may shower today, but no tub baths or hot tubs  · You may have numbness for several hours from the local anesthetic  Please use caution and common sense, especially with weight-bearing activities  CARE OF THE INJECTION SITE  · If you have soreness or pain, apply ice to the area today (20 minutes on/20 minutes off)  · Starting tomorrow, you may use warm, moist heat or ice if needed  · You may have an increase or change in your discomfort for 36-48 hours after your treatment  · Apply ice and continue with any pain medication you have been prescribed  · Notify [facility] if you have any of the following: redness, drainage, swelling, headache, stiff neck or fever above 100°F     SPECIAL INSTRUCTIONS  · Our office will contact you in approximately 7 days for a progress report  MEDICATIONS  · Continue to take all routine medications  · Our office may have instructed you to hold some medications  If you have a problem specifically related to your procedure, please call our office at [phone]  Problems not related to your procedure should be directed to your primary care physician

## 2020-02-25 ENCOUNTER — TELEPHONE (OUTPATIENT)
Dept: PAIN MEDICINE | Facility: CLINIC | Age: 75
End: 2020-02-25

## 2020-04-06 DIAGNOSIS — E11.9 CONTROLLED TYPE 2 DIABETES MELLITUS WITHOUT COMPLICATION, WITHOUT LONG-TERM CURRENT USE OF INSULIN (HCC): Primary | ICD-10-CM

## 2020-04-07 PROBLEM — M48.062 SPINAL STENOSIS OF LUMBAR REGION WITH NEUROGENIC CLAUDICATION: Status: ACTIVE | Noted: 2020-04-07

## 2020-04-08 ENCOUNTER — TELEMEDICINE (OUTPATIENT)
Dept: PAIN MEDICINE | Facility: CLINIC | Age: 75
End: 2020-04-08
Payer: MEDICARE

## 2020-04-08 DIAGNOSIS — M48.062 SPINAL STENOSIS OF LUMBAR REGION WITH NEUROGENIC CLAUDICATION: ICD-10-CM

## 2020-04-08 DIAGNOSIS — M54.16 LUMBAR RADICULOPATHY: Primary | ICD-10-CM

## 2020-04-08 PROCEDURE — 99442 PR PHYS/QHP TELEPHONE EVALUATION 11-20 MIN: CPT | Performed by: ANESTHESIOLOGY

## 2020-05-04 ENCOUNTER — HOSPITAL ENCOUNTER (OUTPATIENT)
Dept: RADIOLOGY | Facility: CLINIC | Age: 75
Discharge: HOME | End: 2020-05-04
Attending: ANESTHESIOLOGY | Admitting: ANESTHESIOLOGY
Payer: MEDICARE

## 2020-05-04 VITALS
RESPIRATION RATE: 20 BRPM | HEART RATE: 75 BPM | DIASTOLIC BLOOD PRESSURE: 82 MMHG | TEMPERATURE: 97.8 F | SYSTOLIC BLOOD PRESSURE: 133 MMHG | OXYGEN SATURATION: 95 %

## 2020-05-04 DIAGNOSIS — M54.16 LUMBAR RADICULOPATHY: ICD-10-CM

## 2020-05-04 DIAGNOSIS — M48.062 SPINAL STENOSIS OF LUMBAR REGION WITH NEUROGENIC CLAUDICATION: ICD-10-CM

## 2020-05-04 PROCEDURE — 62323 NJX INTERLAMINAR LMBR/SAC: CPT | Performed by: ANESTHESIOLOGY

## 2020-05-04 RX ORDER — 0.9 % SODIUM CHLORIDE 0.9 %
5 VIAL (ML) INJECTION ONCE
Status: COMPLETED | OUTPATIENT
Start: 2020-05-04 | End: 2020-05-04

## 2020-05-04 RX ORDER — LIDOCAINE HYDROCHLORIDE 10 MG/ML
5 INJECTION, SOLUTION EPIDURAL; INFILTRATION; INTRACAUDAL; PERINEURAL ONCE
Status: COMPLETED | OUTPATIENT
Start: 2020-05-04 | End: 2020-05-04

## 2020-05-04 RX ORDER — METHYLPREDNISOLONE ACETATE 80 MG/ML
80 INJECTION, SUSPENSION INTRA-ARTICULAR; INTRALESIONAL; INTRAMUSCULAR; PARENTERAL; SOFT TISSUE ONCE
Status: COMPLETED | OUTPATIENT
Start: 2020-05-04 | End: 2020-05-04

## 2020-05-04 RX ADMIN — LIDOCAINE HYDROCHLORIDE 3 ML: 10 INJECTION, SOLUTION EPIDURAL; INFILTRATION; INTRACAUDAL; PERINEURAL at 10:13

## 2020-05-04 RX ADMIN — IOHEXOL 1 ML: 300 INJECTION, SOLUTION INTRAVENOUS at 10:13

## 2020-05-04 RX ADMIN — Medication 5 ML: at 10:14

## 2020-05-04 RX ADMIN — METHYLPREDNISOLONE ACETATE 80 MG: 80 INJECTION, SUSPENSION INTRA-ARTICULAR; INTRALESIONAL; INTRAMUSCULAR; PARENTERAL; SOFT TISSUE at 10:14

## 2020-05-11 ENCOUNTER — TELEPHONE (OUTPATIENT)
Dept: PAIN MEDICINE | Facility: CLINIC | Age: 75
End: 2020-05-11

## 2020-05-12 DIAGNOSIS — E11.9 CONTROLLED TYPE 2 DIABETES MELLITUS WITHOUT COMPLICATION, WITHOUT LONG-TERM CURRENT USE OF INSULIN (HCC): ICD-10-CM

## 2020-05-12 DIAGNOSIS — E78.5 HYPERLIPIDEMIA, UNSPECIFIED HYPERLIPIDEMIA TYPE: Primary | ICD-10-CM

## 2020-05-12 RX ORDER — ATORVASTATIN CALCIUM 20 MG/1
20 TABLET, FILM COATED ORAL DAILY
Qty: 90 TABLET | Refills: 0 | Status: SHIPPED | OUTPATIENT
Start: 2020-05-12

## 2020-05-20 ENCOUNTER — TELEMEDICINE (OUTPATIENT)
Dept: PAIN MEDICINE | Facility: CLINIC | Age: 75
End: 2020-05-20
Payer: MEDICARE

## 2020-05-20 DIAGNOSIS — M54.16 LUMBAR RADICULOPATHY: ICD-10-CM

## 2020-05-20 DIAGNOSIS — M48.061 FORAMINAL STENOSIS OF LUMBAR REGION: Primary | ICD-10-CM

## 2020-05-20 PROCEDURE — 99214 OFFICE O/P EST MOD 30 MIN: CPT | Performed by: ANESTHESIOLOGY

## 2020-05-21 ENCOUNTER — TELEPHONE (OUTPATIENT)
Dept: GYNECOLOGIC ONCOLOGY | Facility: CLINIC | Age: 75
End: 2020-05-21

## 2020-05-26 ENCOUNTER — OFFICE VISIT (OUTPATIENT)
Dept: GYNECOLOGIC ONCOLOGY | Facility: CLINIC | Age: 75
End: 2020-05-26
Payer: MEDICARE

## 2020-05-26 VITALS
TEMPERATURE: 97.6 F | RESPIRATION RATE: 18 BRPM | BODY MASS INDEX: 40.05 KG/M2 | DIASTOLIC BLOOD PRESSURE: 68 MMHG | HEIGHT: 60 IN | WEIGHT: 204 LBS | HEART RATE: 72 BPM | SYSTOLIC BLOOD PRESSURE: 120 MMHG

## 2020-05-26 DIAGNOSIS — Z85.44 HISTORY OF CANCER OF FALLOPIAN TUBE IN ADULTHOOD: Primary | ICD-10-CM

## 2020-05-26 DIAGNOSIS — N39.41 URGE INCONTINENCE: ICD-10-CM

## 2020-05-26 DIAGNOSIS — Z85.42 HISTORY OF ENDOMETRIAL CANCER: ICD-10-CM

## 2020-05-26 PROBLEM — C54.1 ENDOMETRIAL CANCER (HCC): Status: ACTIVE | Noted: 2020-05-26

## 2020-05-26 PROCEDURE — 99214 OFFICE O/P EST MOD 30 MIN: CPT | Performed by: OBSTETRICS & GYNECOLOGY

## 2020-05-26 RX ORDER — QUETIAPINE FUMARATE 100 MG/1
100 TABLET, FILM COATED ORAL EVERY EVENING
COMMUNITY
Start: 2020-05-04

## 2020-05-26 RX ORDER — ICOSAPENT ETHYL 500 MG/1
3 CAPSULE ORAL 2 TIMES DAILY
COMMUNITY
Start: 2020-04-28

## 2020-05-26 RX ORDER — ALPRAZOLAM 0.5 MG/1
0.5 TABLET ORAL 2 TIMES DAILY PRN
COMMUNITY
Start: 2020-05-14

## 2020-06-15 LAB — HBA1C MFR BLD HPLC: 6.7 %

## 2020-06-25 ENCOUNTER — TELEPHONE (OUTPATIENT)
Dept: PAIN MEDICINE | Facility: CLINIC | Age: 75
End: 2020-06-25

## 2020-06-25 DIAGNOSIS — M54.16 LUMBAR RADICULOPATHY: Primary | ICD-10-CM

## 2020-06-29 DIAGNOSIS — E11.9 CONTROLLED TYPE 2 DIABETES MELLITUS WITHOUT COMPLICATION, WITHOUT LONG-TERM CURRENT USE OF INSULIN (HCC): ICD-10-CM

## 2020-06-29 NOTE — TELEPHONE ENCOUNTER
Quantity of 90 can be sent to [facility] in [address] thank you

## 2020-07-02 ENCOUNTER — HOSPITAL ENCOUNTER (OUTPATIENT)
Dept: RADIOLOGY | Facility: CLINIC | Age: 75
Discharge: HOME | End: 2020-07-02
Admitting: ANESTHESIOLOGY
Payer: MEDICARE

## 2020-07-02 VITALS
RESPIRATION RATE: 20 BRPM | TEMPERATURE: 97.9 F | HEART RATE: 73 BPM | OXYGEN SATURATION: 96 % | DIASTOLIC BLOOD PRESSURE: 84 MMHG | SYSTOLIC BLOOD PRESSURE: 134 MMHG

## 2020-07-02 DIAGNOSIS — M54.16 LUMBAR RADICULOPATHY: ICD-10-CM

## 2020-07-02 PROCEDURE — 64483 NJX AA&/STRD TFRM EPI L/S 1: CPT | Performed by: ANESTHESIOLOGY

## 2020-07-02 RX ORDER — BUPIVACAINE HCL/PF 2.5 MG/ML
5 VIAL (ML) INJECTION ONCE
Status: COMPLETED | OUTPATIENT
Start: 2020-07-02 | End: 2020-07-02

## 2020-07-02 RX ORDER — LIDOCAINE HYDROCHLORIDE 10 MG/ML
5 INJECTION, SOLUTION EPIDURAL; INFILTRATION; INTRACAUDAL; PERINEURAL ONCE
Status: COMPLETED | OUTPATIENT
Start: 2020-07-02 | End: 2020-07-02

## 2020-07-02 RX ORDER — PAPAVERINE HCL 150 MG
20 CAPSULE, EXTENDED RELEASE ORAL ONCE
Status: COMPLETED | OUTPATIENT
Start: 2020-07-02 | End: 2020-07-02

## 2020-07-02 RX ADMIN — LIDOCAINE HYDROCHLORIDE 5 ML: 10 INJECTION, SOLUTION EPIDURAL; INFILTRATION; INTRACAUDAL; PERINEURAL at 13:44

## 2020-07-02 RX ADMIN — DEXAMETHASONE SODIUM PHOSPHATE 20 MG: 10 INJECTION, SOLUTION INTRAMUSCULAR; INTRAVENOUS at 13:44

## 2020-07-02 RX ADMIN — Medication 5 ML: at 13:44

## 2020-07-02 NOTE — H&P
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    Breast cancer screening    Obesity (BMI 30-39  9)    Essential hypertension    Mild intermittent asthma without complication    Chronic GERD    Mood disturbance    Chronic pain disorder    Chronic rhinitis    Change in bowel habits    Abdominal pain    Choledocholithiasis    Elevated LFTs    Screen for colon cancer    Neuropathy    Encounter for follow-up surveillance of ovarian cancer    History of ovarian cancer    Encounter for follow-up surveillance of cancer of female genital tract organ    Chronic bilateral back pain    Foraminal stenosis of lumbar region    Lumbar radiculopathy    Spinal stenosis of lumbar region with neurogenic claudication    History of endometrial cancer    Urge incontinence       Past Medical History:   Diagnosis Date    Arthritis     Asthma     Edema     GERD (gastroesophageal reflux disease)     History of hepatitis C     treated    Hyperlipidemia     Hypertension     Lower back pain     Ovarian cancer ([facility] [address] ) 01/01/2017    Pelvic mass     Sleep apnea     uses cpap       Past Surgical History:   Procedure Laterality Date    APPENDECTOMY      CHOLECYSTECTOMY      COLONOSCOPY      DEBULKING TUMOR/ CYTOREDUCTION N/A 5/11/2017    Procedure: DEBULKING TUMOR/ CYTOREDUCTION: RADICAL ABDOMINAL HYSTERECTOMY, BSO, RADICAL RESECTION OF PELVIC TUMOR WITH PELVIC PERITONECTOMY, RESECTION OF RIGHT DIAPHRAGM TUMOR IMPLANT, OMENTECTOMY;  Surgeon: Dena Shelton MD;  [facility];  Service:     ERCP N/A 4/9/2018    Procedure: ENDOSCOPIC RETROGRADE CHOLANGIOPANCREATOGRAPHY (ERCP); Surgeon: Beka Melo MD;  [facility];  Service: Gastroenterology    HYSTERECTOMY      IR PORT REMOVAL  3/11/2019    MAMMO (HISTORICAL)  02/2018    WNL    UT COLONOSCOPY FLX DX W/COLLJ SPEC WHEN PFRMD N/A 7/18/2018    Procedure: COLONOSCOPY;  Surgeon: Beka Melo MD;  [facility];   Service: Gastroenterology    UT LAP,DIAGNOSTIC ABDOMEN N/A 5/11/2017    Procedure: LAPAROSCOPY DIAGNOSTIC;  Surgeon: Radha Campbell MD;  [facility];  Service: Gynecology Oncology    UT PART REMOVAL COLON W ANASTOMOSIS N/A 5/11/2017    Procedure: Splenic flexure mobilization Low anterior resection with EEA 29 coloproctostomy low pelvic anastomosis to 8cm Intraop SPY Fluorescence angiography Intraop flexible sigmoidoscopy;  Surgeon: Sunitha Kruger MD;  [facility];  Service: Colorectal    TONSILLECTOMY           Current Outpatient Medications:     ALPRAZolam (XANAX) 0 5 mg tablet, Take 0 5 mg by mouth 2 (two) times a day as needed, Disp: , Rfl:     atenolol (TENORMIN) 50 mg tablet, Take 50 mg by mouth daily, Disp: , Rfl:     atorvastatin (LIPITOR) 20 mg tablet, Take 1 tablet (20 mg total) by mouth daily, Disp: 90 tablet, Rfl: 0    Cholecalciferol (VITAMIN D3) 2000 units TABS, Take 1 tablet by mouth daily, Disp: , Rfl:     cyclobenzaprine (FLEXERIL) 10 mg tablet, Take 1 tablet (10 mg total) by mouth daily at bedtime, Disp: 30 tablet, Rfl: 0    gabapentin (NEURONTIN) 100 mg capsule, , Disp: , Rfl: 0    ibuprofen (MOTRIN) 400 mg tablet, Take 1 tablet (400 mg total) by mouth every 6 (six) hours as needed for moderate pain or headaches for up to 5 days, Disp: 30 tablet, Rfl: 0    metFORMIN (GLUCOPHAGE) 500 mg tablet, Take 1 tablet (500 mg total) by mouth 2 (two) times a day with meals, Disp: 90 tablet, Rfl: 0    PARoxetine (PAXIL) 10 mg tablet, Take 10 mg by mouth daily, Disp: , Rfl: 0    QUEtiapine (SEROquel) 100 mg tablet, Take 100 mg by mouth every evening, Disp: , Rfl:     traZODone (DESYREL) 50 mg tablet, Take 50 mg by mouth daily at bedtime, Disp: , Rfl: 0    VASCEPA 0 5 g CAPS, Take 3 capsules by mouth 2 (two) times a day, Disp: , Rfl:     Current Facility-Administered Medications:     bupivacaine (PF) (MARCAINE) 0 25 % injection 5 mL, 5 mL, Perineural, Once, Vijay Rader MD    dexamethasone (PF) (DECADRON) injection 20 mg, 20 mg, Perineural, Once, Vijay Rader MD    iohexol (OMNIPAQUE) 300 mg/mL injection 50 mL, 50 mL, Perineural, Once, Vijay Rader MD    lidocaine (PF) (XYLOCAINE-MPF) 1 % injection 5 mL, 5 mL, Perineural, Once, Vijay Rader MD    Allergies   Allergen Reactions    Shellfish Allergy Other (See Comments)     Pt states that her lips swell with all shellfish except for shrimp    Morphine GI Intolerance    Oxycodone GI Intolerance     But patient still takes prn    Oxycodone-Acetaminophen GI Intolerance     Patient takes oxycodone daily prn for severe back pain    Tramadol GI Intolerance     Takes in small amounts       Physical Exam: There were no vitals filed for this visit  General: Awake, Alert, Oriented x 3, Mood and affect appropriate  Respiratory: Respirations even and unlabored  Cardiovascular: Peripheral pulses intact; no edema  Musculoskeletal Exam:  Within normal limits    ASA Score: 2         Assessment:   1   Lumbar radiculopathy        Plan: BILATERAL L5 TFESI

## 2020-07-02 NOTE — DISCHARGE INSTR - LAB
Epidural Steroid Injection   WHAT YOU NEED TO KNOW:   An epidural steroid injection (KISHORE) is a procedure to inject steroid medicine into the epidural space  The epidural space is between your spinal cord and vertebrae  Steroids reduce inflammation and fluid buildup in your spine that may be causing pain  You may be given pain medicine along with the steroids  ACTIVITY  · Do not drive or operate machinery today  · No strenuous activity today - bending, lifting, etc   · You may resume normal activites starting tomorrow - start slowly and as tolerated  · You may shower today, but no tub baths or hot tubs  · You may have numbness for several hours from the local anesthetic  Please use caution and common sense, especially with weight-bearing activities  CARE OF THE INJECTION SITE  · If you have soreness or pain, apply ice to the area today (20 minutes on/20 minutes off)  · Starting tomorrow, you may use warm, moist heat or ice if needed  · You may have an increase or change in your discomfort for 36-48 hours after your treatment  · Apply ice and continue with any pain medication you have been prescribed  · Notify [facility] if you have any of the following: redness, drainage, swelling, headache, stiff neck or fever above 100°F     SPECIAL INSTRUCTIONS  · Our office will contact you in approximately 7 days for a progress report  MEDICATIONS  · Continue to take all routine medications  · Our office may have instructed you to hold some medications  If you have a problem specifically related to your procedure, please call our office at [phone]  Problems not related to your procedure should be directed to your primary care physician

## 2020-07-09 ENCOUNTER — TELEPHONE (OUTPATIENT)
Dept: PAIN MEDICINE | Facility: CLINIC | Age: 75
End: 2020-07-09

## 2020-08-24 DIAGNOSIS — I10 ESSENTIAL HYPERTENSION: Primary | ICD-10-CM

## 2020-08-24 RX ORDER — ATENOLOL 50 MG/1
50 TABLET ORAL DAILY
Qty: 90 TABLET | Refills: 1 | Status: SHIPPED | OUTPATIENT
Start: 2020-08-24

## 2020-09-07 ENCOUNTER — TELEPHONE (OUTPATIENT)
Dept: OTHER | Facility: OTHER | Age: 75
End: 2020-09-07

## 2020-09-07 NOTE — TELEPHONE ENCOUNTER
9/08/20 10:00 AM Alyssa 38, CRNP [[id number]] FP [facility] [[id number]] OVL     Patient called to cancel appointment and will call to reschedule

## 2020-09-11 NOTE — PROGRESS NOTES
Pt offers no complaints today  Labs within parameters 
Tolerated treatment today   Has next appointments set up, declines AVS 
124

## 2020-12-09 ENCOUNTER — LAB (OUTPATIENT)
Dept: LAB | Facility: HOSPITAL | Age: 75
End: 2020-12-09
Attending: OBSTETRICS & GYNECOLOGY
Payer: MEDICARE

## 2020-12-09 ENCOUNTER — OFFICE VISIT (OUTPATIENT)
Dept: GYNECOLOGIC ONCOLOGY | Facility: CLINIC | Age: 75
End: 2020-12-09
Payer: MEDICARE

## 2020-12-09 VITALS
DIASTOLIC BLOOD PRESSURE: 74 MMHG | BODY MASS INDEX: 38.09 KG/M2 | TEMPERATURE: 97.9 F | WEIGHT: 194 LBS | SYSTOLIC BLOOD PRESSURE: 120 MMHG | HEART RATE: 70 BPM | HEIGHT: 60 IN | RESPIRATION RATE: 18 BRPM

## 2020-12-09 DIAGNOSIS — C56.9 MALIGNANT NEOPLASM OF OVARY, UNSPECIFIED LATERALITY (HCC): ICD-10-CM

## 2020-12-09 DIAGNOSIS — Z85.42 HISTORY OF ENDOMETRIAL CANCER: ICD-10-CM

## 2020-12-09 DIAGNOSIS — C56.9 MALIGNANT NEOPLASM OF OVARY, UNSPECIFIED LATERALITY (HCC): Primary | ICD-10-CM

## 2020-12-09 DIAGNOSIS — Z85.44 HISTORY OF CANCER OF FALLOPIAN TUBE IN ADULTHOOD: ICD-10-CM

## 2020-12-09 DIAGNOSIS — Z12.31 ENCOUNTER FOR SCREENING MAMMOGRAM FOR MALIGNANT NEOPLASM OF BREAST: ICD-10-CM

## 2020-12-09 PROCEDURE — 36415 COLL VENOUS BLD VENIPUNCTURE: CPT

## 2020-12-09 PROCEDURE — 86304 IMMUNOASSAY TUMOR CA 125: CPT

## 2020-12-09 PROCEDURE — 99214 OFFICE O/P EST MOD 30 MIN: CPT | Performed by: OBSTETRICS & GYNECOLOGY

## 2020-12-10 LAB — CANCER AG125 SERPL-ACNC: 2.6 U/ML (ref 0–30)

## 2021-01-23 DIAGNOSIS — E11.9 CONTROLLED TYPE 2 DIABETES MELLITUS WITHOUT COMPLICATION, WITHOUT LONG-TERM CURRENT USE OF INSULIN (HCC): ICD-10-CM

## 2021-02-21 DIAGNOSIS — I10 ESSENTIAL HYPERTENSION: ICD-10-CM

## 2021-02-21 RX ORDER — ATENOLOL 50 MG/1
TABLET ORAL
Qty: 90 TABLET | Refills: 1 | OUTPATIENT
Start: 2021-02-21

## 2021-03-01 DIAGNOSIS — I10 ESSENTIAL HYPERTENSION: ICD-10-CM

## 2021-03-02 RX ORDER — ATENOLOL 50 MG/1
TABLET ORAL
Qty: 90 TABLET | Refills: 1 | OUTPATIENT
Start: 2021-03-02

## 2021-03-29 ENCOUNTER — TELEPHONE (OUTPATIENT)
Dept: FAMILY MEDICINE CLINIC | Facility: CLINIC | Age: 76
End: 2021-03-29

## 2021-03-30 NOTE — TELEPHONE ENCOUNTER
03/29/21 10:04 PM     Thank you for your request  Your request has been received, reviewed, and the patient chart updated  The PCP has successfully been removed with a patient attribution note  This message will now be completed      Thank you  Aide Monday

## 2021-05-05 ENCOUNTER — IMMUNIZATIONS (OUTPATIENT)
Dept: FAMILY MEDICINE CLINIC | Facility: HOSPITAL | Age: 76
End: 2021-05-05

## 2021-05-05 DIAGNOSIS — Z23 ENCOUNTER FOR IMMUNIZATION: Primary | ICD-10-CM

## 2021-05-05 PROCEDURE — 0001A SARS-COV-2 / COVID-19 MRNA VACCINE (PFIZER-BIONTECH) 30 MCG: CPT

## 2021-05-05 PROCEDURE — 91300 SARS-COV-2 / COVID-19 MRNA VACCINE (PFIZER-BIONTECH) 30 MCG: CPT

## 2021-05-26 ENCOUNTER — IMMUNIZATIONS (OUTPATIENT)
Dept: FAMILY MEDICINE CLINIC | Facility: HOSPITAL | Age: 76
End: 2021-05-26

## 2021-05-26 DIAGNOSIS — Z23 ENCOUNTER FOR IMMUNIZATION: Primary | ICD-10-CM

## 2021-05-26 PROCEDURE — 91300 SARS-COV-2 / COVID-19 MRNA VACCINE (PFIZER-BIONTECH) 30 MCG: CPT

## 2021-05-26 PROCEDURE — 0002A SARS-COV-2 / COVID-19 MRNA VACCINE (PFIZER-BIONTECH) 30 MCG: CPT

## 2021-05-27 ENCOUNTER — APPOINTMENT (EMERGENCY)
Dept: CT IMAGING | Facility: HOSPITAL | Age: 76
End: 2021-05-27
Payer: MEDICARE

## 2021-05-27 ENCOUNTER — HOSPITAL ENCOUNTER (EMERGENCY)
Facility: HOSPITAL | Age: 76
Discharge: HOME | End: 2021-05-27
Attending: EMERGENCY MEDICINE | Admitting: EMERGENCY MEDICINE
Payer: MEDICARE

## 2021-05-27 VITALS
BODY MASS INDEX: 41.29 KG/M2 | WEIGHT: 211.42 LBS | TEMPERATURE: 99.2 F | OXYGEN SATURATION: 94 % | DIASTOLIC BLOOD PRESSURE: 58 MMHG | RESPIRATION RATE: 15 BRPM | HEART RATE: 57 BPM | SYSTOLIC BLOOD PRESSURE: 114 MMHG

## 2021-05-27 DIAGNOSIS — M54.50 LOW BACK PAIN: ICD-10-CM

## 2021-05-27 DIAGNOSIS — R32 URINARY INCONTINENCE: Primary | ICD-10-CM

## 2021-05-27 LAB
ALBUMIN SERPL BCP-MCNC: 3.2 G/DL (ref 3.5–5)
ALP SERPL-CCNC: 93 U/L (ref 46–116)
ALT SERPL W P-5'-P-CCNC: 47 U/L (ref 12–78)
ANION GAP SERPL CALCULATED.3IONS-SCNC: 7 MMOL/L (ref 4–13)
AST SERPL W P-5'-P-CCNC: 41 U/L (ref 5–45)
BASOPHILS # BLD AUTO: 0.05 THOUSANDS/ΜL (ref 0–0.1)
BASOPHILS NFR BLD AUTO: 1 % (ref 0–1)
BILIRUB SERPL-MCNC: 0.48 MG/DL (ref 0.2–1)
BILIRUB UR QL STRIP: NEGATIVE
BUN SERPL-MCNC: 15 MG/DL (ref 5–25)
CALCIUM ALBUM COR SERPL-MCNC: 9.6 MG/DL (ref 8.3–10.1)
CALCIUM SERPL-MCNC: 9 MG/DL (ref 8.3–10.1)
CHLORIDE SERPL-SCNC: 104 MMOL/L (ref 100–108)
CLARITY UR: CLEAR
CO2 SERPL-SCNC: 27 MMOL/L (ref 21–32)
COLOR UR: YELLOW
CREAT SERPL-MCNC: 0.73 MG/DL (ref 0.6–1.3)
EOSINOPHIL # BLD AUTO: 0.3 THOUSAND/ΜL (ref 0–0.61)
EOSINOPHIL NFR BLD AUTO: 3 % (ref 0–6)
ERYTHROCYTE [DISTWIDTH] IN BLOOD BY AUTOMATED COUNT: 13.9 % (ref 11.6–15.1)
GFR SERPL CREATININE-BSD FRML MDRD: 81 ML/MIN/1.73SQ M
GLUCOSE SERPL-MCNC: 138 MG/DL (ref 65–140)
GLUCOSE UR STRIP-MCNC: NEGATIVE MG/DL
HCT VFR BLD AUTO: 39.9 % (ref 34.8–46.1)
HGB BLD-MCNC: 12.5 G/DL (ref 11.5–15.4)
HGB UR QL STRIP.AUTO: NEGATIVE
IMM GRANULOCYTES # BLD AUTO: 0.02 THOUSAND/UL (ref 0–0.2)
IMM GRANULOCYTES NFR BLD AUTO: 0 % (ref 0–2)
KETONES UR STRIP-MCNC: NEGATIVE MG/DL
LEUKOCYTE ESTERASE UR QL STRIP: NEGATIVE
LYMPHOCYTES # BLD AUTO: 2.33 THOUSANDS/ΜL (ref 0.6–4.47)
LYMPHOCYTES NFR BLD AUTO: 26 % (ref 14–44)
MCH RBC QN AUTO: 29.4 PG (ref 26.8–34.3)
MCHC RBC AUTO-ENTMCNC: 31.3 G/DL (ref 31.4–37.4)
MCV RBC AUTO: 94 FL (ref 82–98)
MONOCYTES # BLD AUTO: 0.51 THOUSAND/ΜL (ref 0.17–1.22)
MONOCYTES NFR BLD AUTO: 6 % (ref 4–12)
NEUTROPHILS # BLD AUTO: 5.94 THOUSANDS/ΜL (ref 1.85–7.62)
NEUTS SEG NFR BLD AUTO: 64 % (ref 43–75)
NITRITE UR QL STRIP: NEGATIVE
NRBC BLD AUTO-RTO: 0 /100 WBCS
PH UR STRIP.AUTO: 7 [PH]
PLATELET # BLD AUTO: 203 THOUSANDS/UL (ref 149–390)
PMV BLD AUTO: 11.7 FL (ref 8.9–12.7)
POTASSIUM SERPL-SCNC: 4.2 MMOL/L (ref 3.5–5.3)
PROT SERPL-MCNC: 6.9 G/DL (ref 6.4–8.2)
PROT UR STRIP-MCNC: NEGATIVE MG/DL
RBC # BLD AUTO: 4.25 MILLION/UL (ref 3.81–5.12)
SODIUM SERPL-SCNC: 138 MMOL/L (ref 136–145)
SP GR UR STRIP.AUTO: <=1.005 (ref 1–1.03)
UROBILINOGEN UR QL STRIP.AUTO: 0.2 E.U./DL
WBC # BLD AUTO: 9.15 THOUSAND/UL (ref 4.31–10.16)

## 2021-05-27 PROCEDURE — 96375 TX/PRO/DX INJ NEW DRUG ADDON: CPT

## 2021-05-27 PROCEDURE — 81003 URINALYSIS AUTO W/O SCOPE: CPT | Performed by: PHYSICIAN ASSISTANT

## 2021-05-27 PROCEDURE — 85025 COMPLETE CBC W/AUTO DIFF WBC: CPT | Performed by: PHYSICIAN ASSISTANT

## 2021-05-27 PROCEDURE — 74177 CT ABD & PELVIS W/CONTRAST: CPT

## 2021-05-27 PROCEDURE — 80053 COMPREHEN METABOLIC PANEL: CPT | Performed by: PHYSICIAN ASSISTANT

## 2021-05-27 PROCEDURE — 36415 COLL VENOUS BLD VENIPUNCTURE: CPT | Performed by: PHYSICIAN ASSISTANT

## 2021-05-27 PROCEDURE — 96374 THER/PROPH/DIAG INJ IV PUSH: CPT

## 2021-05-27 PROCEDURE — 99284 EMERGENCY DEPT VISIT MOD MDM: CPT

## 2021-05-27 PROCEDURE — 99284 EMERGENCY DEPT VISIT MOD MDM: CPT | Performed by: PHYSICIAN ASSISTANT

## 2021-05-27 RX ORDER — FENTANYL CITRATE 50 UG/ML
50 INJECTION, SOLUTION INTRAMUSCULAR; INTRAVENOUS ONCE
Status: COMPLETED | OUTPATIENT
Start: 2021-05-27 | End: 2021-05-27

## 2021-05-27 RX ORDER — CYCLOBENZAPRINE HCL 10 MG
10 TABLET ORAL ONCE
Status: COMPLETED | OUTPATIENT
Start: 2021-05-27 | End: 2021-05-27

## 2021-05-27 RX ORDER — CYCLOBENZAPRINE HCL 10 MG
10 TABLET ORAL 2 TIMES DAILY PRN
Qty: 20 TABLET | Refills: 0 | Status: SHIPPED | OUTPATIENT
Start: 2021-05-27

## 2021-05-27 RX ORDER — ONDANSETRON 2 MG/ML
4 INJECTION INTRAMUSCULAR; INTRAVENOUS ONCE
Status: COMPLETED | OUTPATIENT
Start: 2021-05-27 | End: 2021-05-27

## 2021-05-27 RX ORDER — LIDOCAINE 50 MG/G
1 PATCH TOPICAL ONCE
Status: DISCONTINUED | OUTPATIENT
Start: 2021-05-27 | End: 2021-05-27 | Stop reason: HOSPADM

## 2021-05-27 RX ORDER — CYCLOBENZAPRINE HCL 10 MG
10 TABLET ORAL 2 TIMES DAILY PRN
Qty: 20 TABLET | Refills: 0 | Status: SHIPPED | OUTPATIENT
Start: 2021-05-27 | End: 2021-05-27

## 2021-05-27 RX ADMIN — LIDOCAINE 5% 1 PATCH: 700 PATCH TOPICAL at 08:16

## 2021-05-27 RX ADMIN — IOHEXOL 100 ML: 350 INJECTION, SOLUTION INTRAVENOUS at 09:06

## 2021-05-27 RX ADMIN — CYCLOBENZAPRINE HYDROCHLORIDE 10 MG: 10 TABLET, FILM COATED ORAL at 08:16

## 2021-05-27 RX ADMIN — FENTANYL CITRATE 50 MCG: 0.05 INJECTION, SOLUTION INTRAMUSCULAR; INTRAVENOUS at 08:16

## 2021-05-27 RX ADMIN — ONDANSETRON 4 MG: 2 INJECTION INTRAMUSCULAR; INTRAVENOUS at 08:16

## 2021-05-27 NOTE — ED PROVIDER NOTES
History  Chief Complaint   Patient presents with    Back Pain     Reports several weeks of cramping type back pain and occasional incontinence  Denies injury   Urinary Incontinence     A 79-year-old female presents emergency department with complaints after chronic low back pain  States has been present for several weeks  She states that last night she was getting out of bed to go to the restroom when she was incontinent  States that because her back pain she is not able to get out of bed fast enough to use the restroom  She denies any numbness, tingling, radiating pain  She denies any saddle paresthesias or loss of stool control  She is able to ambulate  Prior to Admission Medications   Prescriptions Last Dose Informant Patient Reported? Taking?    ALPRAZolam (XANAX) 0 5 mg tablet  Self Yes No   Sig: Take 0 5 mg by mouth 2 (two) times a day as needed   Cholecalciferol (VITAMIN D3) 2000 units TABS  Self Yes No   Sig: Take 1 tablet by mouth daily   PARoxetine (PAXIL) 10 mg tablet  Self Yes No   Sig: Take 10 mg by mouth daily   QUEtiapine (SEROquel) 100 mg tablet  Self Yes No   Sig: Take 100 mg by mouth every evening   VASCEPA 0 5 g CAPS  Self Yes No   Sig: Take 3 capsules by mouth 2 (two) times a day   atenolol (TENORMIN) 50 mg tablet  Self No No   Sig: Take 1 tablet (50 mg total) by mouth daily   atorvastatin (LIPITOR) 20 mg tablet  Self No No   Sig: Take 1 tablet (20 mg total) by mouth daily   cyclobenzaprine (FLEXERIL) 10 mg tablet  Self No No   Sig: Take 1 tablet (10 mg total) by mouth daily at bedtime   gabapentin (NEURONTIN) 100 mg capsule  Self Yes No   ibuprofen (MOTRIN) 400 mg tablet  Self No No   Sig: Take 1 tablet (400 mg total) by mouth every 6 (six) hours as needed for moderate pain or headaches for up to 5 days   metFORMIN (GLUCOPHAGE) 500 mg tablet  Self No No   Sig: Take 1 tablet (500 mg total) by mouth 2 (two) times a day with meals   traZODone (DESYREL) 50 mg tablet  Self Yes No Sig: Take 50 mg by mouth daily at bedtime      Facility-Administered Medications: None       Past Medical History:   Diagnosis Date    Arthritis     Asthma     Edema     GERD (gastroesophageal reflux disease)     History of hepatitis C     treated    Hyperlipidemia     Hypertension     Lower back pain     Ovarian cancer ([facility] [address] ) 01/01/2017    Pelvic mass     Sleep apnea     uses cpap       Past Surgical History:   Procedure Laterality Date    APPENDECTOMY      CHOLECYSTECTOMY      COLONOSCOPY      DEBULKING TUMOR/ CYTOREDUCTION N/A 5/11/2017    Procedure: DEBULKING TUMOR/ CYTOREDUCTION: RADICAL ABDOMINAL HYSTERECTOMY, BSO, RADICAL RESECTION OF PELVIC TUMOR WITH PELVIC PERITONECTOMY, RESECTION OF RIGHT DIAPHRAGM TUMOR IMPLANT, OMENTECTOMY;  Surgeon: Diaz Cano MD;  [facility];  Service:     ERCP N/A 4/9/2018    Procedure: ENDOSCOPIC RETROGRADE CHOLANGIOPANCREATOGRAPHY (ERCP); Surgeon: Oscar Landis MD;  [facility];  Service: Gastroenterology    HYSTERECTOMY      IR PORT REMOVAL  3/11/2019    MAMMO (HISTORICAL)  02/2018    WNL    CT COLONOSCOPY FLX DX W/COLLJ SPEC WHEN PFRMD N/A 7/18/2018    Procedure: COLONOSCOPY;  Surgeon: Oscar Landis MD;  [facility];   Service: Gastroenterology    CT LAP,DIAGNOSTIC ABDOMEN N/A 5/11/2017    Procedure: LAPAROSCOPY DIAGNOSTIC;  Surgeon: Diaz Cano MD;  [facility];  Service: Gynecology Oncology    CT PART REMOVAL COLON W ANASTOMOSIS N/A 5/11/2017    Procedure: Splenic flexure mobilization Low anterior resection with EEA 29 coloproctostomy low pelvic anastomosis to 8cm Intraop SPY Fluorescence angiography Intraop flexible sigmoidoscopy;  Surgeon: Sobai Manuel MD;  [facility];  Service: Colorectal    TONSILLECTOMY         Family History   Problem Relation Age of Onset    Heart failure Mother     Heart failure Father     No Known Problems Daughter     No Known Problems Maternal Grandmother     No Known Problems Paternal Grandmother     No Known Problems Daughter     No Known Problems Maternal Aunt      I have reviewed and agree with the history as documented  E-Cigarette/Vaping    E-Cigarette Use Never User      E-Cigarette/Vaping Substances    Nicotine No     THC No     CBD No     Flavoring No     Other No     Unknown No      Social History     Tobacco Use    Smoking status: Never Smoker    Smokeless tobacco: Never Used   Substance Use Topics    Alcohol use: No    Drug use: No       Review of Systems   Constitutional: Negative for fever  Respiratory: Negative for shortness of breath  Cardiovascular: Negative for chest pain  Genitourinary: Positive for urgency  Negative for difficulty urinating  Musculoskeletal: Positive for back pain  Neurological: Negative for weakness and numbness  All other systems reviewed and are negative  Physical Exam  Physical Exam  Vitals signs reviewed  Constitutional:       Appearance: Normal appearance  HENT:      Head: Normocephalic and atraumatic  Right Ear: Tympanic membrane, ear canal and external ear normal       Left Ear: Tympanic membrane, ear canal and external ear normal       Nose: Nose normal       Mouth/Throat:      Mouth: Mucous membranes are moist    Eyes:      Extraocular Movements: Extraocular movements intact  Conjunctiva/sclera: Conjunctivae normal       Pupils: Pupils are equal, round, and reactive to light  Cardiovascular:      Rate and Rhythm: Normal rate and regular rhythm  Pulses: Normal pulses  Pulmonary:      Effort: Pulmonary effort is normal       Breath sounds: Normal breath sounds  Abdominal:      General: Bowel sounds are normal       Palpations: Abdomen is soft  Tenderness: There is no right CVA tenderness or left CVA tenderness  Musculoskeletal:      Lumbar back: She exhibits decreased range of motion, tenderness and pain  Comments:  There is negative straight leg raise bilaterally  Motor strength bilateral lower extremities is 4/5 and symmetric  Sensation light touch intact in all dermatomes  Rectal tone is intact  There is no evidence of cauda equina syndrome  Skin:     General: Skin is warm  Capillary Refill: Capillary refill takes less than 2 seconds  Neurological:      General: No focal deficit present  Mental Status: She is alert and oriented to person, place, and time  Psychiatric:         Mood and Affect: Mood normal          Behavior: Behavior normal          Thought Content:  Thought content normal          Judgment: Judgment normal          Vital Signs  ED Triage Vitals [05/27/21 0749]   Temperature Pulse Respirations Blood Pressure SpO2   99 2 °F (37 3 °C) 65 15 136/64 94 %      Temp Source Heart Rate Source Patient Position - Orthostatic VS BP Location FiO2 (%)   Oral Monitor Lying Right arm --      Pain Score       5           Vitals:    05/27/21 0749 05/27/21 1000   BP: 136/64 114/58   Pulse: 65 57   Patient Position - Orthostatic VS: Lying Lying         Visual Acuity      ED Medications  Medications   lidocaine (LIDODERM) 5 % patch 1 patch (1 patch Topical Medication Applied 5/27/21 0816)   cyclobenzaprine (FLEXERIL) tablet 10 mg (10 mg Oral Given 5/27/21 0816)   fentanyl citrate (PF) 100 MCG/2ML 50 mcg (50 mcg Intravenous Given 5/27/21 0816)   ondansetron (ZOFRAN) injection 4 mg (4 mg Intravenous Given 5/27/21 0816)   iohexol (OMNIPAQUE) 350 MG/ML injection (SINGLE-DOSE) 100 mL (100 mL Intravenous Given 5/27/21 0906)       Diagnostic Studies  Results Reviewed     Procedure Component Value Units Date/Time    UA w Reflex to Microscopic w Reflex to Culture [[id number]] Collected: 05/27/21 1003    Lab Status: Final result Specimen: Urine, Clean Catch Updated: 05/27/21 1021     Color, UA Yellow     Clarity, UA Clear     Specific Gravity, UA <=1 005     pH, UA 7 0     Leukocytes, UA Negative     Nitrite, UA Negative     Protein, UA Negative mg/dl      Glucose, UA Negative mg/dl      Ketones, UA Negative mg/dl      Urobilinogen, UA 0 2 E U /dl      Bilirubin, UA Negative     Blood, UA Negative    Comprehensive metabolic panel [[id number]]  (Abnormal) Collected: 05/27/21 0810    Lab Status: Final result Specimen: Blood from Arm, Right Updated: 05/27/21 0842     Sodium 138 mmol/L      Potassium 4 2 mmol/L      Chloride 104 mmol/L      CO2 27 mmol/L      ANION GAP 7 mmol/L      BUN 15 mg/dL      Creatinine 0 73 mg/dL      Glucose 138 mg/dL      Calcium 9 0 mg/dL      Corrected Calcium 9 6 mg/dL      AST 41 U/L      ALT 47 U/L      Alkaline Phosphatase 93 U/L      Total Protein 6 9 g/dL      Albumin 3 2 g/dL      Total Bilirubin 0 48 mg/dL      eGFR 81 ml/min/1 73sq m     Narrative:      [facility] guidelines for Chronic Kidney Disease (CKD):     Stage 1 with normal or high GFR (GFR > 90 mL/min/1 73 square meters)    Stage 2 Mild CKD (GFR = 60-89 mL/min/1 73 square meters)    Stage 3A Moderate CKD (GFR = 45-59 mL/min/1 73 square meters)    Stage 3B Moderate CKD (GFR = 30-44 mL/min/1 73 square meters)    Stage 4 Severe CKD (GFR = 15-29 mL/min/1 73 square meters)    Stage 5 End Stage CKD (GFR <15 mL/min/1 73 square meters)  Note: GFR calculation is accurate only with a steady state creatinine    CBC and differential [[id number]]  (Abnormal) Collected: 05/27/21 0810    Lab Status: Final result Specimen: Blood from Arm, Right Updated: 05/27/21 0821     WBC 9 15 Thousand/uL      RBC 4 25 Million/uL      Hemoglobin 12 5 g/dL      Hematocrit 39 9 %      MCV 94 fL      MCH 29 4 pg      MCHC 31 3 g/dL      RDW 13 9 %      MPV 11 7 fL      Platelets 478 Thousands/uL      nRBC 0 /100 WBCs      Neutrophils Relative 64 %      Immat GRANS % 0 %      Lymphocytes Relative 26 %      Monocytes Relative 6 %      Eosinophils Relative 3 %      Basophils Relative 1 %      Neutrophils Absolute 5 94 Thousands/µL      Immature Grans Absolute 0 02 Thousand/uL      Lymphocytes Absolute 2 33 Thousands/µL      Monocytes Absolute 0 51 Thousand/µL      Eosinophils Absolute 0 30 Thousand/µL      Basophils Absolute 0 05 Thousands/µL                  CT abdomen pelvis w contrast   Final Result by Roxy Lozoya MD (05/27 1004)      No acute findings  Several nonemergent and chronic findings are described above  Workstation performed: [id number]                    Procedures  Procedures         ED Course                                           MDM  Number of Diagnoses or Management Options  Low back pain:   Urinary incontinence:   Diagnosis management comments: A 81-year-old female presents emergency department with complaints after chronic low back pain  States has been present for several weeks  She states that last night she was getting out of bed to go to the restroom when she was incontinent  States that because her back pain she is not able to get out of bed fast enough to use the restroom  She denies any numbness, tingling, radiating pain  She denies any saddle paresthesias or loss of stool control  She is able to ambulate  On examination, there is tenderness to palpation along paraspinal muscles lumbar spine  There is negative straight leg raise bilaterally  Motor strength bilateral lower extremities is 4/5 and symmetric  Sensation light touch intact in all dermatomes  Rectal tone is intact  There is no evidence of cauda equina syndrome  Lab evaluation is performed and is unremarkable  There is no evidence of urinary tract infection  CT abdomen was obtained that show any evidence of pyelonephritis or cystitis  Findings were discussed at length  Patient has no evidence of cauda equina at this time  She was able to ambulate around the department without difficulty or assistance  There is no neurological deficit noted  Patient given prescription for Flexeril to help with her back pain    Patient follow-up with family physician  Her meds were discussed  Patient stable for discharge  Amount and/or Complexity of Data Reviewed  Clinical lab tests: ordered and reviewed  Tests in the radiology section of CPT®: reviewed and ordered  Review and summarize past medical records: yes    Risk of Complications, Morbidity, and/or Mortality  Presenting problems: moderate  Diagnostic procedures: moderate  Management options: moderate    Patient Progress  Patient progress: stable      Disposition  Final diagnoses:   Urinary incontinence   Low back pain     Time reflects when diagnosis was documented in both MDM as applicable and the Disposition within this note     Time User Action Codes Description Comment    5/27/2021 10:28 AM Jerl Adan Add [R32] Urinary incontinence     5/27/2021 10:28 AM Jerl Adan Add [M54 5] Low back pain       ED Disposition     ED Disposition Condition Date/Time Comment    Discharge Good Th May 27, 2021 10:28 AM Yanet Giordano discharge to home/self care  Follow-up Information     Follow up With Specialties Details Why Anita Leyva MD Family Medicine Schedule an appointment as soon as possible for a visit   [address] [place] [address]  [phone]            Discharge Medication List as of 5/27/2021 10:29 AM      START taking these medications    Details   !! cyclobenzaprine (FLEXERIL) 10 mg tablet Take 1 tablet (10 mg total) by mouth 2 (two) times a day as needed for muscle spasms, Starting Thu 5/27/2021, Normal       !! - Potential duplicate medications found  Please discuss with provider        CONTINUE these medications which have NOT CHANGED    Details   ALPRAZolam (XANAX) 0 5 mg tablet Take 0 5 mg by mouth 2 (two) times a day as needed, Starting Thu 5/14/2020, Historical Med      atenolol (TENORMIN) 50 mg tablet Take 1 tablet (50 mg total) by mouth daily, Starting Mon 8/24/2020, Normal      atorvastatin (LIPITOR) 20 mg tablet Take 1 tablet (20 mg total) by mouth daily, Starting Tue 5/12/2020, Normal      Cholecalciferol (VITAMIN D3) 2000 units TABS Take 1 tablet by mouth daily, Historical Med      !! cyclobenzaprine (FLEXERIL) 10 mg tablet Take 1 tablet (10 mg total) by mouth daily at bedtime, Starting Thu 12/12/2019, Normal      gabapentin (NEURONTIN) 100 mg capsule Starting Wed 11/6/2019, Historical Med      ibuprofen (MOTRIN) 400 mg tablet Take 1 tablet (400 mg total) by mouth every 6 (six) hours as needed for moderate pain or headaches for up to 5 days, Starting Fri 4/26/2019, Print      metFORMIN (GLUCOPHAGE) 500 mg tablet Take 1 tablet (500 mg total) by mouth 2 (two) times a day with meals, Starting Wed 7/1/2020, Normal      PARoxetine (PAXIL) 10 mg tablet Take 10 mg by mouth daily, Starting Tue 2/6/2018, Historical Med      QUEtiapine (SEROquel) 100 mg tablet Take 100 mg by mouth every evening, Starting Mon 5/4/2020, Historical Med      traZODone (DESYREL) 50 mg tablet Take 50 mg by mouth daily at bedtime, Starting Tue 2/26/2019, Historical Med      VASCEPA 0 5 g CAPS Take 3 capsules by mouth 2 (two) times a day, Starting Tue 4/28/2020, Historical Med       !! - Potential duplicate medications found  Please discuss with provider  No discharge procedures on file      PDMP Review     None          ED Provider  Electronically Signed by           Kate Campa PA-C  05/27/21 7288

## 2021-06-03 ENCOUNTER — TELEPHONE (OUTPATIENT)
Dept: OTHER | Facility: OTHER | Age: 76
End: 2021-06-03

## 2021-06-04 ENCOUNTER — TELEPHONE (OUTPATIENT)
Dept: GYNECOLOGIC ONCOLOGY | Facility: CLINIC | Age: 76
End: 2021-06-04

## 2021-06-04 NOTE — TELEPHONE ENCOUNTER
Patient canceled appointment for 6/9 with Dr Kain Peck   I called and left a message for her to call back to reschedule with Dr Kain Peck in [place]

## 2023-10-31 NOTE — PROGRESS NOTES
Pt offers no complaints/ Labs within parameters 
Tolerated treatment today  Has next appointments set up, printed AVS and reviewed 
None known

## (undated) DEVICE — RETRIEVAL BALLOON CATHETER: Brand: EXTRACTOR™ PRO RX

## (undated) DEVICE — OCCLUDER COLPO-PNEUMO

## (undated) DEVICE — LOCKING DEVICE AND BIOPSY CAP FOR USE WITH RX BILIARY SYSTEM: Brand: RX LOCKING DEVICE AND BIOPSY CAP

## (undated) DEVICE — CHLORAPREP HI-LITE 26ML ORANGE

## (undated) DEVICE — ENDOPATH XCEL UNIVERSAL TROCAR STABLILITY SLEEVES: Brand: ENDOPATH XCEL

## (undated) DEVICE — 3000CC GUARDIAN II: Brand: GUARDIAN

## (undated) DEVICE — INTENDED FOR TISSUE SEPARATION, AND OTHER PROCEDURES THAT REQUIRE A SHARP SURGICAL BLADE TO PUNCTURE OR CUT.: Brand: BARD-PARKER SAFETY BLADES SIZE 15, STERILE

## (undated) DEVICE — TRAY FOLEY 16FR URIMETER SURESTEP

## (undated) DEVICE — CONTOUR CURVED CUTTER STAPLER RELOAD: Brand: CONTOUR

## (undated) DEVICE — INTENDED FOR TISSUE SEPARATION, AND OTHER PROCEDURES THAT REQUIRE A SHARP SURGICAL BLADE TO PUNCTURE OR CUT.: Brand: BARD-PARKER SAFETY BLADES SIZE 11, STERILE

## (undated) DEVICE — ABC HANDPIECE 45° FOOTSWITCHING HANDPIECE: Brand: ABC

## (undated) DEVICE — WOUND RETRACTOR AND PROTECTOR: Brand: ALEXIS O WOUND PROTECTOR-RETRACTOR

## (undated) DEVICE — CONTOUR CURVED CUTTER STAPLER: Brand: CONTOUR

## (undated) DEVICE — SUT MONOCRYL 4-0 PS-2 18 IN Y496G

## (undated) DEVICE — DRAPE FLUID WARMER (BIRD BATH)

## (undated) DEVICE — 1820 FOAM BLOCK NEEDLE COUNTER: Brand: DEVON

## (undated) DEVICE — ENDOSCOPIC CURVED INTRALUMINAL STAPLER (ILS) 24 TITANIUM ADJUSTABLE HEIGHT STAPLES

## (undated) DEVICE — INSUFFLATION TUBING PRIMFLO

## (undated) DEVICE — GLOVE SRG BIOGEL ECLIPSE 7.5

## (undated) DEVICE — GLOVE INDICATOR PI UNDERGLOVE SZ 8 BLUE

## (undated) DEVICE — GLOVE SRG BIOGEL 8.5

## (undated) DEVICE — GLOVE INDICATOR PI UNDERGLOVE SZ 7 BLUE

## (undated) DEVICE — MAYO STAND COVER: Brand: CONVERTORS

## (undated) DEVICE — PACK ROBOTIC PROSTATE PBDS DA VINCI SI/XI

## (undated) DEVICE — SUT VICRYL 0 CT-1 27 IN J260H

## (undated) DEVICE — SUT PROLENE 2-0 SH 36 IN 8523H

## (undated) DEVICE — JACKSON-PRATT 100CC BULB RESERVOIR: Brand: CARDINAL HEALTH

## (undated) DEVICE — SUT ETHILON 2-0 FSLX 30 IN 1674H

## (undated) DEVICE — GLOVE SRG BIOGEL 7

## (undated) DEVICE — SUT VICRYL 3-0 SH 27 IN J416H

## (undated) DEVICE — STERILE LAP LITHOTOMY PACK: Brand: CARDINAL HEALTH

## (undated) DEVICE — ASTOUND STANDARD SURGICAL GOWN, XL: Brand: CONVERTORS

## (undated) DEVICE — INJECTION SYTEM RAPID REFILL RX

## (undated) DEVICE — SPHINCTEROTOME: Brand: DREAMTOME™ RX 44

## (undated) DEVICE — TUBING SUCTION 5MM X 12 FT

## (undated) DEVICE — SYRINGE 50ML LL

## (undated) DEVICE — GLOVE SRG BIOGEL ECLIPSE 6.5

## (undated) DEVICE — SPONGE LAP 18 X 18 IN

## (undated) DEVICE — MEDI-VAC YANKAUER SUCTION HANDLE W/STRAIGHT TIP & CONTROL VENT: Brand: CARDINAL HEALTH

## (undated) DEVICE — BUTTON SWITCH PENCIL HOLSTER: Brand: VALLEYLAB

## (undated) DEVICE — GLOVE SRG BIOGEL 8

## (undated) DEVICE — BULB SYRINGE,IRRIGATION WITH PROTECTIVE CAP: Brand: DOVER

## (undated) DEVICE — ADHESIVE SKN CLSR HISTOACRYL FLEX 0.5ML LF

## (undated) DEVICE — Device: Brand: DEFENDO AIR/WATER/SUCTION AND BIOPSY VALVE

## (undated) DEVICE — SUT VICRYL 0 REEL 54 IN J287G

## (undated) DEVICE — JP CHANNEL DRAIN 19FR, FULL FLUTES: Brand: JACKSON-PRATT

## (undated) DEVICE — TOWEL SET X-RAY

## (undated) DEVICE — ENDOPATH XCEL BLADELESS TROCARS WITH STABILITY SLEEVES: Brand: ENDOPATH XCEL

## (undated) DEVICE — SUT VICRYL 0 CT-1 CR/8 27 IN JJ41G

## (undated) DEVICE — SUT PDS II 1 XLH 96 IN LOOPED Z881G

## (undated) DEVICE — SPECIMEN TRAP: Brand: ARGYLE

## (undated) DEVICE — ABDOMINAL PAD: Brand: DERMACEA

## (undated) DEVICE — NEEDLE 25G X 1 1/2

## (undated) DEVICE — INSULATED BLADE ELECTRODE;CAUTION: FOR MANUFACTURING, PROCESSING, OR REPACKING.: Brand: EDGE

## (undated) DEVICE — POOLE SUCTION HANDLE: Brand: CARDINAL HEALTH

## (undated) DEVICE — GLOVE SRG BIOGEL 7.5

## (undated) DEVICE — WIREGUIDED RETRIEVAL BASKET: Brand: TRAPEZOID RX

## (undated) DEVICE — DRAIN SPONGES,6 PLY: Brand: EXCILON

## (undated) DEVICE — ALL PURPOSE SPONGES,NONWOVEN, 4 PLY: Brand: CURITY

## (undated) DEVICE — ENSEAL 20 CM SHAFT, LARGE JAW: Brand: ENSEAL X1

## (undated) DEVICE — CHLORHEXIDINE 4PCT 4 OZ

## (undated) DEVICE — SPY ELITE SINGLE VIAL KIT

## (undated) DEVICE — GLOVE INDICATOR PI UNDERGLOVE SZ 8.5 BLUE

## (undated) DEVICE — 3M™ TEGADERM™ TRANSPARENT FILM DRESSING FRAME STYLE, 1624W, 2-3/8 IN X 2-3/4 IN (6 CM X 7 CM), 100/CT 4CT/CASE: Brand: 3M™ TEGADERM™

## (undated) DEVICE — TELFA NON-ADHERENT ABSORBENT DRESSING: Brand: TELFA

## (undated) DEVICE — 1840 FOAM BLOCK NEEDLE COUNTER: Brand: DEVON

## (undated) DEVICE — ANTI-FOG SOLUTION WITH FOAM PAD: Brand: DEVON